# Patient Record
Sex: FEMALE | Race: WHITE | Employment: FULL TIME | URBAN - METROPOLITAN AREA
[De-identification: names, ages, dates, MRNs, and addresses within clinical notes are randomized per-mention and may not be internally consistent; named-entity substitution may affect disease eponyms.]

---

## 2017-07-12 ENCOUNTER — OFFICE VISIT (OUTPATIENT)
Dept: FAMILY MEDICINE | Facility: CLINIC | Age: 27
End: 2017-07-12
Payer: COMMERCIAL

## 2017-07-12 VITALS
DIASTOLIC BLOOD PRESSURE: 79 MMHG | OXYGEN SATURATION: 99 % | WEIGHT: 225.4 LBS | TEMPERATURE: 99.8 F | BODY MASS INDEX: 36.22 KG/M2 | SYSTOLIC BLOOD PRESSURE: 138 MMHG | HEART RATE: 89 BPM | HEIGHT: 66 IN

## 2017-07-12 DIAGNOSIS — R07.0 THROAT PAIN: Primary | ICD-10-CM

## 2017-07-12 DIAGNOSIS — H69.93 DYSFUNCTION OF EUSTACHIAN TUBE, BILATERAL: ICD-10-CM

## 2017-07-12 DIAGNOSIS — J01.00 ACUTE NON-RECURRENT MAXILLARY SINUSITIS: ICD-10-CM

## 2017-07-12 LAB
DEPRECATED S PYO AG THROAT QL EIA: NORMAL
MICRO REPORT STATUS: NORMAL
SPECIMEN SOURCE: NORMAL

## 2017-07-12 PROCEDURE — 87070 CULTURE OTHR SPECIMN AEROBIC: CPT | Performed by: PHYSICIAN ASSISTANT

## 2017-07-12 PROCEDURE — 87880 STREP A ASSAY W/OPTIC: CPT | Performed by: PHYSICIAN ASSISTANT

## 2017-07-12 PROCEDURE — 99213 OFFICE O/P EST LOW 20 MIN: CPT | Performed by: PHYSICIAN ASSISTANT

## 2017-07-12 NOTE — PROGRESS NOTES
"SUBJECTIVE:                                                    Jennifer Smiley is a 27 year old female who presents to clinic today for the following health issues:    ENT Symptoms             Symptoms: cc Present Absent Comment   Fever/Chills  x  Chills, no fever   Fatigue  x     Muscle Aches   x Did have soreness in upper back and neck, now is better   Eye Irritation   x    Sneezing  x     Nasal Topher/Drg x x  Postnasal, congestion   Sinus Pressure/Pain  x  Frontal and maxillary, bilateral   Loss of smell   x    Dental pain   x    Sore Throat  x     Swollen Glands   x    Ear Pain/Fullness  x  Ears plugged   Cough x x  productive   Wheeze   x    Chest Pain   x    Shortness of breath   x    Rash   x    Other  x  Headaches     Symptom duration:  1 week    Symptom severity:  moderate   Treatments tried:  Dayquil, nyquil. Nothing PO today    Contacts:  None     Left TM burst 1 year ago    Problem list and histories reviewed & adjusted, as indicated.  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     OBJECTIVE:                                                    /79 (BP Location: Right arm, Patient Position: Sitting, Cuff Size: Adult Regular)  Pulse 89  Temp 99.8  F (37.7  C) (Tympanic)  Ht 5' 5.75\" (1.67 m)  Wt 225 lb 6.4 oz (102.2 kg)  SpO2 99%  BMI 36.66 kg/m2 Body mass index is 36.66 kg/(m^2).   GENERAL: healthy, alert, well nourished, well hydrated, no distress  HENT: ear canals- normal; TMs- POSITIVE fluid bilaterally. Left TM mildly erythematous, bulging, no purulence " but has some bubbles from TM with the bulging; Nose- normal; Mouth- no ulcers, no lesions  NECK: no tenderness, no adenopathy, no asymmetry, no masses, no stiffness; thyroid- normal to palpation  RESP: lungs clear to auscultation - no rales, no rhonchi, no wheezes  CV: regular rates and rhythm, normal S1 S2, no S3 or S4 and no murmur, no click or rub -  ABDOMEN: soft, no tenderness, no  hepatosplenomegaly, no masses, normal bowel sounds       ASSESSMENT/PLAN:                                                      ASSESSMENT/PLAN:      ICD-10-CM    1. Throat pain R07.0 Strep, Rapid Screen     Throat Culture Aerobic Bacterial     CANCELED: Beta strep group A culture   2. Acute non-recurrent maxillary sinusitis J01.00 amoxicillin-clavulanate (AUGMENTIN) 875-125 MG per tablet   3. Dysfunction of eustachian tube, bilateral H69.83        Patient Instructions   Start augmentin  Continue dayquil/nyquil    Have plenty of rest and fluids  Humidified air can be very helpful with cough  Use nasal spray Afrin OTC for 3-4 days in each nostril for congestion  Antihistamine - drying  Follow up if worsening symptoms or if not improving    Margarita Ross PA-C   [facility]

## 2017-07-12 NOTE — NURSING NOTE
"Chief Complaint   Patient presents with     Sinus Problem     x 1 week       Initial There were no vitals taken for this visit. Estimated body mass index is 36.89 kg/(m^2) as calculated from the following:    Height as of 10/13/16: 5' 5.75\" (1.67 m).    Weight as of 10/13/16: 226 lb 12.8 oz (102.9 kg).  Medication Reconciliation: complete  "

## 2017-07-12 NOTE — PATIENT INSTRUCTIONS
Start augmentin  Continue dayquil/nyquil    Have plenty of rest and fluids  Humidified air can be very helpful with cough  Use nasal spray Afrin OTC for 3-4 days in each nostril for congestion  Antihistamine - drying  Follow up if worsening symptoms or if not improving

## 2017-07-12 NOTE — MR AVS SNAPSHOT
After Visit Summary   7/12/2017    Jennifer Smiley    MRN: [id number]           Patient Information     Date Of Birth          1990        Visit Information        Provider Department      7/12/2017 9:40 AM Margarita Ross PA-C [facility]        Today's Diagnoses     Throat pain    -  1    Acute non-recurrent maxillary sinusitis        Dysfunction of eustachian tube, bilateral          Care Instructions    Start augmentin  Continue dayquil/nyquil    Have plenty of rest and fluids  Humidified air can be very helpful with cough  Use nasal spray Afrin OTC for 3-4 days in each nostril for congestion  Follow up if worsening symptoms or if not improving          Follow-ups after your visit        Who to contact     Normal or non-critical lab and imaging results will be communicated to you by Lendiohart, letter or phone within 4 business days after the clinic has received the results. If you do not hear from us within 7 days, please contact the clinic through Lendiohart or phone. If you have a critical or abnormal lab result, we will notify you by phone as soon as possible.  Submit refill requests through Isolation Network or call your pharmacy and they will forward the refill request to us. Please allow 3 business days for your refill to be completed.          If you need to speak with a  for additional information , please call: [phone]             Additional Information About Your Visit        Isolation Network Information     Isolation Network gives you secure access to your electronic health record. If you see a primary care provider, you can also send messages to your care team and make appointments. If you have questions, please call your primary care clinic.  If you do not have a primary care provider, please call [phone] and they will assist you.        Care EveryWhere ID     This is your Care EveryWhere ID. This could be used by other organizations to access your [facility]  "records  [id number]        Your Vitals Were     Pulse Temperature Height Pulse Oximetry BMI (Body Mass Index)       89 99.8  F (37.7  C) (Tympanic) 5' 5.75\" (1.67 m) 99% 36.66 kg/m2        Blood Pressure from Last 3 Encounters:   07/12/17 138/79   10/13/16 127/83   09/29/16 137/83    Weight from Last 3 Encounters:   07/12/17 225 lb 6.4 oz (102.2 kg)   10/13/16 226 lb 12.8 oz (102.9 kg)   09/29/16 225 lb (102.1 kg)              We Performed the Following     Strep, Rapid Screen     Throat Culture Aerobic Bacterial          Today's Medication Changes          These changes are accurate as of: 7/12/17 10:42 AM.  If you have any questions, ask your nurse or doctor.               Start taking these medicines.        Dose/Directions    amoxicillin-clavulanate 875-125 MG per tablet   Commonly known as:  AUGMENTIN   Used for:  Acute non-recurrent maxillary sinusitis   Started by:  Margarita Ross PA-C        Dose:  1 tablet   Take 1 tablet by mouth 2 times daily   Quantity:  20 tablet   Refills:  0            Where to get your medicines      These medications were sent to [facility] [address]  [address] [place] [facility] [address]     Phone:  [phone]     amoxicillin-clavulanate 875-125 MG per tablet                Primary Care Provider Office Phone # Fax #    Philipp Pringle [phone]       [facility] [id number] [place] [address]        Equal Access to Services     [facility] AH: Hadii todd ku hadasho Soomaali, waaxda luqadaha, qaybta kaalmada adeegyada, loyda boucher. So [facility] [phone].    ATENCIÓN: Si habla español, tiene a larsen disposición servicios gratuitos de asistencia lingüística. Llame al [phone].    We comply with applicable federal civil rights laws and [place] laws. We do not discriminate on the basis of race, color, national origin, age, disability sex, sexual orientation or gender " identity.            Thank you!     Thank you for choosing [facility]  for your care. Our goal is always to provide you with excellent care. Hearing back from our patients is one way we can continue to improve our services. Please take a few minutes to complete the written survey that you may receive in the mail after your visit with us. Thank you!             Your Updated Medication List - Protect others around you: Learn how to safely use, store and throw away your medicines at www.disposemymeds.org.          This list is accurate as of: 7/12/17 10:42 AM.  Always use your most recent med list.                   Brand Name Dispense Instructions for use Diagnosis    amoxicillin-clavulanate 875-125 MG per tablet    AUGMENTIN    20 tablet    Take 1 tablet by mouth 2 times daily    Acute non-recurrent maxillary sinusitis       IBUPROFEN PO      Take 400 mg by mouth        levonorgestrel 20 MCG/24HR IUD    MIRENA    1 each    1 each (20 mcg) by Intrauterine route once for 1 dose    Encounter for IUD insertion       TYLENOL PO      Take 1,000 mg by mouth

## 2017-07-15 LAB
BACTERIA SPEC CULT: NORMAL
MICRO REPORT STATUS: NORMAL
SPECIMEN SOURCE: NORMAL

## 2017-07-21 ENCOUNTER — OFFICE VISIT (OUTPATIENT)
Dept: OBGYN | Facility: CLINIC | Age: 27
End: 2017-07-21
Payer: COMMERCIAL

## 2017-07-21 VITALS
BODY MASS INDEX: 36.16 KG/M2 | WEIGHT: 225 LBS | DIASTOLIC BLOOD PRESSURE: 73 MMHG | SYSTOLIC BLOOD PRESSURE: 127 MMHG | HEART RATE: 82 BPM | HEIGHT: 66 IN

## 2017-07-21 DIAGNOSIS — Z30.432 ENCOUNTER FOR IUD REMOVAL: Primary | ICD-10-CM

## 2017-07-21 PROCEDURE — 58301 REMOVE INTRAUTERINE DEVICE: CPT | Performed by: OBSTETRICS & GYNECOLOGY

## 2017-07-21 NOTE — MR AVS SNAPSHOT
"              After Visit Summary   7/21/2017    Jennifer Smiley    MRN: [id number]           Patient Information     Date Of Birth          1990        Visit Information        Provider Department      7/21/2017 1:30 PM Mira Garcia MD [facility]        Today's Diagnoses     Encounter for IUD removal    -  1       Follow-ups after your visit        Who to contact     If you have questions or need follow up information about today's clinic visit or your schedule please contact [facility] directly at [phone].  Normal or non-critical lab and imaging results will be communicated to you by CrowdPChart, letter or phone within 4 business days after the clinic has received the results. If you do not hear from us within 7 days, please contact the clinic through Joyhoundt or phone. If you have a critical or abnormal lab result, we will notify you by phone as soon as possible.  Submit refill requests through Axiom Education or call your pharmacy and they will forward the refill request to us. Please allow 3 business days for your refill to be completed.          Additional Information About Your Visit        MyChart Information     [employer] gives you secure access to your electronic health record. If you see a primary care provider, you can also send messages to your care team and make appointments. If you have questions, please call your primary care clinic.  If you do not have a primary care provider, please call [phone] and they will assist you.        Care EveryWhere ID     This is your Care EveryWhere ID. This could be used by other organizations to access your [facility] medical records  [id number]        Your Vitals Were     Pulse Height Last Period BMI (Body Mass Index)          82 5' 5.75\" (1.67 m) 07/15/2017 36.59 kg/m2         Blood Pressure from Last 3 Encounters:   07/21/17 127/73   07/12/17 138/79   10/13/16 127/83    Weight from Last 3 Encounters:   07/21/17 225 lb " (102.1 kg)   07/12/17 225 lb 6.4 oz (102.2 kg)   10/13/16 226 lb 12.8 oz (102.9 kg)              We Performed the Following     REMOVE INTRAUTERINE DEVICE        Primary Care Provider Office Phone # Fax #    Philipp Pringle [phone]       [facility] [address] [facility] [address]        Equal Access to Services     GRIS[facility]RE : Hadii aad ku hadasho Soomaali, waaxda luqadaha, qaybta kaalmada adeegyada, waxay idiin hayaan adeeg kharash lasander . So [facility] [phone].    ATENCIÓN: Si habla umang, tiene a larsen disposición servicios gratuitos de asistencia lingüística. Llame al [phone].    We comply with applicable federal civil rights laws and [place] laws. We do not discriminate on the basis of race, color, national origin, age, disability sex, sexual orientation or gender identity.            Thank you!     Thank you for choosing [facility]  for your care. Our goal is always to provide you with excellent care. Hearing back from our patients is one way we can continue to improve our services. Please take a few minutes to complete the written survey that you may receive in the mail after your visit with us. Thank you!             Your Updated Medication List - Protect others around you: Learn how to safely use, store and throw away your medicines at www.disposemymeds.org.          This list is accurate as of: 7/21/17  1:50 PM.  Always use your most recent med list.                   Brand Name Dispense Instructions for use Diagnosis    amoxicillin-clavulanate 875-125 MG per tablet    AUGMENTIN    20 tablet    Take 1 tablet by mouth 2 times daily    Acute non-recurrent maxillary sinusitis       IBUPROFEN PO      Take 400 mg by mouth        levonorgestrel 20 MCG/24HR IUD    MIRENA    1 each    1 each (20 mcg) by Intrauterine route once for 1 dose    Encounter for IUD insertion       TYLENOL PO      Take 1,000 mg by mouth

## 2017-07-21 NOTE — NURSING NOTE
"Initial /73 (BP Location: Right arm, Patient Position: Chair, Cuff Size: Adult Large)  Pulse 82  Ht 5' 5.75\" (1.67 m)  Wt 225 lb (102.1 kg)  LMP 07/15/2017  BMI 36.59 kg/m2 Estimated body mass index is 36.59 kg/(m^2) as calculated from the following:    Height as of this encounter: 5' 5.75\" (1.67 m).    Weight as of this encounter: 225 lb (102.1 kg). .      "

## 2017-07-21 NOTE — PROGRESS NOTES
Jennifer is a 27 year old   female who presents for removal of a Mirena IUD, which was inserted in ; she is ready to pursue pregnancy; she is not currently taking a prenatal vitamin.    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    Past Medical History:   Diagnosis Date     Otitis media     Recurrent otitis       Past Surgical History:   Procedure Laterality Date     PE TUBES      x 2         Current Outpatient Prescriptions:      amoxicillin-clavulanate (AUGMENTIN) 875-125 MG per tablet, Take 1 tablet by mouth 2 times daily, Disp: 20 tablet, Rfl: 0     IBUPROFEN PO, Take 400 mg by mouth, Disp: , Rfl:      Acetaminophen (TYLENOL PO), Take 1,000 mg by mouth, Disp: , Rfl:      levonorgestrel (MIRENA) 20 MCG/24HR IUD, 1 each (20 mcg) by Intrauterine route once for 1 dose, Disp: 1 each, Rfl: 0    ALLERGIES:  Pineapple and Ceclor [cefaclor]    Social History     Social History     Marital status:      Spouse name: N/A     Number of children: N/A     Years of education: N/A     Social History Main Topics     Smoking status: Never Smoker     Smokeless tobacco: Never Used     Alcohol use 0.0 oz/week     0 Standard drinks or equivalent per week      Comment: occas.     Drug use: No     Sexual activity: Yes     Partners: Male      Comment: engaged (together since )     Other Topics Concern     Parent/Sibling W/ Cabg, Mi Or Angioplasty Before 65f 55m? No     Social History Narrative       Family History   Problem Relation Age of Onset     Hypertension Mother  "     DIABETES Mother      DIABETES Maternal Grandmother      Hypertension Maternal Grandmother      Hypertension Maternal Grandfather      Autoimmune Disease Maternal Grandfather      lupus     Hypertension Paternal Grandmother      HEART DISEASE Paternal Grandmother      Cancer - colorectal Paternal Grandfather      in his 70's       OBJECTIVE:  Vitals: /73 (BP Location: Right arm, Patient Position: Chair, Cuff Size: Adult Large)  Pulse 82  Ht 5' 5.75\" (1.67 m)  Wt 225 lb (102.1 kg)  LMP 07/15/2017  BMI 36.59 kg/m2 BMI= Body mass index is 36.59 kg/(m^2).   Patient's last menstrual period was 07/15/2017.   After assuring informed consent, the pt was placed in lithotomy position, string located extending out the external os.  The string was grasped with a ring forceps and the IUD removed in a quick movement; pt tolerated the procedure well; the IUD was discarded    ASSESSMENT:      ICD-10-CM    1. Encounter for IUD removal Z30.432 REMOVE INTRAUTERINE DEVICE       PLAN:  Reminded pt about keeping an eye on 1st day of LMP moving forward, need to start vitamin with Folic Acid    Mira Garcia MD    "

## 2017-10-19 ENCOUNTER — PRENATAL OFFICE VISIT (OUTPATIENT)
Dept: OBGYN | Facility: CLINIC | Age: 27
End: 2017-10-19
Payer: COMMERCIAL

## 2017-10-19 DIAGNOSIS — Z34.00 PRENATAL CARE, FIRST PREGNANCY: Primary | ICD-10-CM

## 2017-10-19 PROCEDURE — 99207 ZZC NO CHARGE NURSE ONLY: CPT | Performed by: OBSTETRICS & GYNECOLOGY

## 2017-10-19 NOTE — MR AVS SNAPSHOT
After Visit Summary   10/19/2017    Jennifer Smiley    MRN: [id number]           Patient Information     Date Of Birth          1990        Visit Information        Provider Department      10/19/2017 3:17 PM Triny Soni MD [facility]        Today's Diagnoses     Prenatal care, first pregnancy    -  1       Follow-ups after your visit        Your next 10 appointments already scheduled     Nov 13, 2017  2:00 PM CST   New Prenatal with Triny Soni MD, [facility]   [facility]    [address] [facility] [address]   [phone]              Who to contact     If you have questions or need follow up information about today's clinic visit or your schedule please contact [facility] directly at [phone].  Normal or non-critical lab and imaging results will be communicated to you by MyChart, letter or phone within 4 business days after the clinic has received the results. If you do not hear from us within 7 days, please contact the clinic through MyChart or phone. If you have a critical or abnormal lab result, we will notify you by phone as soon as possible.  Submit refill requests through Incont or call your pharmacy and they will forward the refill request to us. Please allow 3 business days for your refill to be completed.          Additional Information About Your Visit        MyChart Information     Incont gives you secure access to your electronic health record. If you see a primary care provider, you can also send messages to your care team and make appointments. If you have questions, please call your primary care clinic.  If you do not have a primary care provider, please call [phone] and they will assist you.        Care EveryWhere ID     This is your Care EveryWhere ID. This could be used by other organizations to access your [facility] medical records  [id number]        Your Vitals  Were     Last Period                   09/14/2017            Blood Pressure from Last 3 Encounters:   07/21/17 127/73   07/12/17 138/79   10/13/16 127/83    Weight from Last 3 Encounters:   07/21/17 102.1 kg (225 lb)   07/12/17 102.2 kg (225 lb 6.4 oz)   10/13/16 102.9 kg (226 lb 12.8 oz)              Today, you had the following     No orders found for display       Primary Care Provider Office Phone # Fax #    Philipp Pringle [phone] [address] [facility] [address]        Equal Access to Services     [facility] TEJAL : Hadii todd ho hadaddyo Soleti, waaxda luqadaha, qaybta kaalmada angelica, loyda boucher. So [facility] [phone].    ATENCIÓN: Si habla español, tiene a larsen disposición servicios gratuitos de asistencia lingüística. Ll[facility] [phone].    We comply with applicable federal civil rights laws and [place] laws. We do not discriminate on the basis of race, color, national origin, age, disability, sex, sexual orientation, or gender identity.            Thank you!     Thank you for choosing [facility]  for your care. Our goal is always to provide you with excellent care. Hearing back from our patients is one way we can continue to improve our services. Please take a few minutes to complete the written survey that you may receive in the mail after your visit with us. Thank you!             Your Updated Medication List - Protect others around you: Learn how to safely use, store and throw away your medicines at www.disposemymeds.org.          This list is accurate as of: 10/19/17  3:30 PM.  Always use your most recent med list.                   Brand Name Dispense Instructions for use Diagnosis    FOLIC ACID PO      Take 800 mcg by mouth daily        TYLENOL PO      Take 1,000 mg by mouth

## 2017-11-13 ENCOUNTER — PRENATAL OFFICE VISIT (OUTPATIENT)
Dept: OBGYN | Facility: CLINIC | Age: 27
End: 2017-11-13
Payer: COMMERCIAL

## 2017-11-13 ENCOUNTER — HOSPITAL ENCOUNTER (OUTPATIENT)
Dept: ULTRASOUND IMAGING | Facility: CLINIC | Age: 27
Discharge: HOME | End: 2017-11-13
Attending: OBSTETRICS & GYNECOLOGY | Admitting: OBSTETRICS & GYNECOLOGY
Payer: COMMERCIAL

## 2017-11-13 VITALS
HEART RATE: 96 BPM | DIASTOLIC BLOOD PRESSURE: 91 MMHG | BODY MASS INDEX: 36.59 KG/M2 | SYSTOLIC BLOOD PRESSURE: 131 MMHG | WEIGHT: 225 LBS

## 2017-11-13 DIAGNOSIS — Z34.01 ENCOUNTER FOR PRENATAL CARE IN FIRST TRIMESTER OF FIRST PREGNANCY: Primary | ICD-10-CM

## 2017-11-13 DIAGNOSIS — Z34.01 ENCOUNTER FOR PRENATAL CARE IN FIRST TRIMESTER OF FIRST PREGNANCY: ICD-10-CM

## 2017-11-13 LAB
ABO + RH BLD: NORMAL
ABO + RH BLD: NORMAL
BLD GP AB SCN SERPL QL: NORMAL
BLOOD BANK CMNT PATIENT-IMP: NORMAL
SPECIMEN EXP DATE BLD: NORMAL

## 2017-11-13 PROCEDURE — 86850 RBC ANTIBODY SCREEN: CPT | Performed by: OBSTETRICS & GYNECOLOGY

## 2017-11-13 PROCEDURE — 86900 BLOOD TYPING SEROLOGIC ABO: CPT | Performed by: OBSTETRICS & GYNECOLOGY

## 2017-11-13 PROCEDURE — 86901 BLOOD TYPING SEROLOGIC RH(D): CPT | Performed by: OBSTETRICS & GYNECOLOGY

## 2017-11-13 PROCEDURE — 76817 TRANSVAGINAL US OBSTETRIC: CPT

## 2017-11-13 PROCEDURE — 76817 TRANSVAGINAL US OBSTETRIC: CPT | Performed by: OBSTETRICS & GYNECOLOGY

## 2017-11-13 PROCEDURE — 36415 COLL VENOUS BLD VENIPUNCTURE: CPT | Performed by: OBSTETRICS & GYNECOLOGY

## 2017-11-13 PROCEDURE — 99214 OFFICE O/P EST MOD 30 MIN: CPT | Mod: 25 | Performed by: OBSTETRICS & GYNECOLOGY

## 2017-11-13 RX ORDER — PRENATAL VIT/IRON FUM/FOLIC AC 27MG-0.8MG
1 TABLET ORAL DAILY
COMMUNITY

## 2017-11-13 NOTE — MR AVS SNAPSHOT
After Visit Summary   11/13/2017    Jennifer Orlando    MRN: [id number]           Patient Information     Date Of Birth          1990        Visit Information        Provider Department      11/13/2017 2:00 PM Triny Soni MD; [facility]        Today's Diagnoses     Encounter for prenatal care in first trimester of first pregnancy    -  1       Follow-ups after your visit        Future tests that were ordered for you today     Open Future Orders        Priority Expected Expires Ordered    US OB Transvaginal Only STAT  11/13/2018 11/13/2017            Who to contact     If you have questions or need follow up information about today's clinic visit or your schedule please contact [facility] directly at [phone].  Normal or non-critical lab and imaging results will be communicated to you by Wallithart, letter or phone within 4 business days after the clinic has received the results. If you do not hear from us within 7 days, please contact the clinic through Wallithart or phone. If you have a critical or abnormal lab result, we will notify you by phone as soon as possible.  Submit refill requests through Bizware or call your pharmacy and they will forward the refill request to us. Please allow 3 business days for your refill to be completed.          Additional Information About Your Visit        MyChart Information     Bizware gives you secure access to your electronic health record. If you see a primary care provider, you can also send messages to your care team and make appointments. If you have questions, please call your primary care clinic.  If you do not have a primary care provider, please call [phone] and they will assist you.        Care EveryWhere ID     This is your Care EveryWhere ID. This could be used by other organizations to access your [place] medical records  [id number]        Your Vitals Were     Pulse Last Period Breastfeeding?  BMI (Body Mass Index)          96 09/14/2017 No 36.59 kg/m2         Blood Pressure from Last 3 Encounters:   11/13/17 (!) 131/91   07/21/17 127/73   07/12/17 138/79    Weight from Last 3 Encounters:   11/13/17 225 lb (102.1 kg)   07/21/17 225 lb (102.1 kg)   07/12/17 225 lb 6.4 oz (102.2 kg)              We Performed the Following     ABO/Rh type and screen     US OB <14 Weeks w Transvaginal Single        Primary Care Provider Office Phone # Fax #    Philippsid Pringle [phone] [address] [facility] [address]        Equal Access to Services     GRISEL TOURE : Hadii todd mcguireo Soleti, waaxda luqadaha, qaybta kaalmada adeegyada, loyda boucher. So [facility] [phone].    ATENCIÓN: Si habla español, tiene a larsen disposición servicios gratuitos de asistencia lingüística. [facility] [phone].    We comply with applicable federal civil rights laws and [place] laws. We do not discriminate on the basis of race, color, national origin, age, disability, sex, sexual orientation, or gender identity.            Thank you!     Thank you for choosing [facility]  for your care. Our goal is always to provide you with excellent care. Hearing back from our patients is one way we can continue to improve our services. Please take a few minutes to complete the written survey that you may receive in the mail after your visit with us. Thank you!             Your Updated Medication List - Protect others around you: Learn how to safely use, store and throw away your medicines at www.disposemymeds.org.          This list is accurate as of: 11/13/17  4:34 PM.  Always use your most recent med list.                   Brand Name Dispense Instructions for use Diagnosis    FOLIC ACID PO      Take 800 mcg by mouth daily        prenatal multivitamin plus iron 27-0.8 MG Tabs per tablet      Take 1 tablet by mouth daily

## 2017-11-13 NOTE — NURSING NOTE
"Initial BP (!) 131/91 (BP Location: Left arm, Patient Position: Chair, Cuff Size: Adult Large)  Pulse 96  Wt 225 lb (102.1 kg)  LMP 09/14/2017  Breastfeeding? No  BMI 36.59 kg/m2 Estimated body mass index is 36.59 kg/(m^2) as calculated from the following:    Height as of 7/21/17: 5' 5.75\" (1.67 m).    Weight as of this encounter: 225 lb (102.1 kg). .    Mirian Ortega    "

## 2017-11-13 NOTE — PROGRESS NOTES
Jennifer is a 27 year old  @ 8.4 weeks here for new ob visit.      ROS: Ten point review of systems was reviewed and negative except the above.  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 Can take amox/augmentin       Current Outpatient Prescriptions on File Prior to Visit:  FOLIC ACID PO Take 800 mcg by mouth daily     No current facility-administered medications on file prior to visit.     Past Medical History of Father of Baby:   No significant medical history    Physical Exam: BP (!) 131/91 (BP Location: Left arm, Patient Position: Chair, Cuff Size: Adult Large)  Pulse 96  Wt 225 lb (102.1 kg)  LMP 2017  Breastfeeding? No  BMI 36.59 kg/m2  General: Well developed, well nourished female  Skin: Normal  HEENT: Normal  Neck: Supple,no adenopathy,thyroid normal  Chest: Clear  Heart: Regular rate, rhythm,No murmur, rub, gallop  Breasts: Not examined   Abdomen: Benign,Soft, flat, non-tender,No masses, organomegaly,No inguinal nodes,Bowel sounds normoactive   Extremities: Normal  Neurological: Normal   Perineum: Normal   Vulva: Normal     Transvaginal ultrasound was performed.  A  nonviable intrauterine pregnancy was seen.  CRL consistent with 6 weeks, 0 days.  Fetal heart motion was not visualized.    Formal U/S confirms findings.     A/P 27 year old  at  8.4 weeks    1. Reviewed that miscarriage occurs ~ 1 in 5 pregnancy events and that there was no direct event or prevention  that the patient could have avoided or performed.  Discussed that there are many etiologies for miscarriage, the most common being a genetic anomaly.  Reviewed that risk of miscarriage for next pregnancy is not elevated by the current event.    Reviewed options of expectant management, D&C, and medical therapy (cytotec).  Reviewed risks and benefits of all options.  All questions answered.  Patient given written information about her options and will call us with further questions or decision.     Triny Soni M.D.     30 minutes was spent face to face with the patient today discussing her history, diagnosis, and follow-up plan as noted above.  Over 50% of the visit was spent in counseling and coordination of care.

## 2017-11-17 ENCOUNTER — MYC MEDICAL ADVICE (OUTPATIENT)
Dept: OBGYN | Facility: CLINIC | Age: 27
End: 2017-11-17

## 2017-11-17 NOTE — TELEPHONE ENCOUNTER
Per Dr Singleton:    Per Dr. John documentation, there was a fetus without cardiac activity.  Normally, when the CRL measures greater or equal to 6 weeks (which this was) cardiac activity can be appreciated.  This is suspicious for fetal demise.  Review miscarriage precautions and patient should follow-up with Dr. Soni next week if she has additional concerns or questions.

## 2017-11-17 NOTE — TELEPHONE ENCOUNTER
Dr Singleton, Can you advise re: My chart message below in Dr. Soni's absence?.     I don't see a mention of a sac on US report and looks like this was an early PG termination. I also don't see any current HCG levels were done.     Please advise. Mell Pacheco RN

## 2017-11-17 NOTE — LETTER
SURGERYPLANNING/SCHEDULING WORKSHEET                                   Jennifer Orlando                :  1990  MRN:  [id number]  Phone: [phone] (home)    Same Day Surgery [phone]   Surgeon: Triny Soni MD  Diagnosis:   miscarriage  Allergies:  Pineapple and Ceclor [cefaclor]   A preoperative evaluation and physical will be done by this office.   ====================================================  Surgical Procedure:  OB-GYN D&C  Length of Procedure:  30  Type of anesthesia:  Local with MAC  The proposed surgical procedure is considered LOW risk.  Date of Procedure:__92-03-25______    Time: __11:30am_____________       Informed Consent Obtained and Signed:  NO  ====================================================  Instructions to Same Day Surgery Staff  Pneumoboots  Special Equipment: suction  Preop Antibiotic:  Other:  Doxycycline 100 mg x 1  Preop Pain Meds:  None  PreOp Orders:  Routine Standing Orders.  ====================================================  Instructions to the patient:  Come to the hospital at: ____10:30am___________________________  HOME PREPARATION:   Shower with Hibiclens the night before and the morning of surgery, gently cleaning skin from neck to feet  Bathe and brush teeth the morning of surgery.  Take medications with a sip of water the morning of surgery:   May have  a light meal, toast and clear liquids, up to 8 hrs before surgery  May have clear liquids (liquids one can read through) up to 2 hrs before surgery  NOTHING after 2 hrs before surgery  Triny Soni MD    2017

## 2017-11-22 NOTE — TELEPHONE ENCOUNTER
Pt scheduled for surgery 11-29-17.  All instructions given.    -Isabelle CRAIN [facility]  Clinic Station

## 2017-11-27 ENCOUNTER — TELEPHONE (OUTPATIENT)
Dept: OBGYN | Facility: CLINIC | Age: 27
End: 2017-11-27

## 2017-11-27 DIAGNOSIS — O03.9 MISCARRIAGE: Primary | ICD-10-CM

## 2017-11-27 NOTE — TELEPHONE ENCOUNTER
Pt called to let the MD know that she is cancelling the D & C surgery that was scheduled for 11/29/17 due to her insurance.  Pt wants to know if she can get the pills for this instead?    Please call-     Brittany Mcgee  Clinic Station

## 2017-11-28 ENCOUNTER — ANESTHESIA EVENT (OUTPATIENT)
Dept: SURGERY | Facility: CLINIC | Age: 27
End: 2017-11-28
Payer: COMMERCIAL

## 2017-11-28 RX ORDER — OXYCODONE HYDROCHLORIDE 5 MG/1
5 TABLET ORAL EVERY 4 HOURS PRN
Qty: 10 TABLET | Refills: 0 | Status: SHIPPED | OUTPATIENT
Start: 2017-11-28 | End: 2017-11-28

## 2017-11-28 RX ORDER — ONDANSETRON 4 MG/1
4-8 TABLET, ORALLY DISINTEGRATING ORAL EVERY 6 HOURS PRN
Qty: 5 TABLET | Refills: 1 | Status: SHIPPED | OUTPATIENT
Start: 2017-11-28 | End: 2017-11-28

## 2017-11-28 RX ORDER — MISOPROSTOL 200 UG/1
400 TABLET ORAL EVERY 4 HOURS
Qty: 12 TABLET | Refills: 1 | Status: SHIPPED | OUTPATIENT
Start: 2017-11-28 | End: 2017-11-28

## 2017-11-28 NOTE — TELEPHONE ENCOUNTER
I called surgery scheduling and the 11:30 time is still available, after Jennifer calls back tell her to arrive at 10:30am and re-send the surgery letter.    Brittany Mcgee  Clinic Station [place]

## 2017-11-28 NOTE — TELEPHONE ENCOUNTER
Patient working with insurance and may be able to have coverage.   If so, patient is choosing to go this route and would like the surgery rescheduled.  Patient will call us back to let us know for sure this morning.    Will send to surgery scheduling as an FYI for planning.    Flower Davila   Ob/Gyn Clinic  RN

## 2017-11-28 NOTE — TELEPHONE ENCOUNTER
Pt called back and she would like the surgery put back on for tomorrow.  Resent surgery letter.    Brittany Mazaton  Clinic Station [place]

## 2017-11-29 ENCOUNTER — HOSPITAL ENCOUNTER (OUTPATIENT)
Facility: CLINIC | Age: 27
Discharge: HOME | End: 2017-11-29
Attending: OBSTETRICS & GYNECOLOGY | Admitting: OBSTETRICS & GYNECOLOGY
Payer: COMMERCIAL

## 2017-11-29 ENCOUNTER — SURGERY (OUTPATIENT)
Age: 27
End: 2017-11-29

## 2017-11-29 ENCOUNTER — ANESTHESIA (OUTPATIENT)
Dept: SURGERY | Facility: CLINIC | Age: 27
End: 2017-11-29
Payer: COMMERCIAL

## 2017-11-29 VITALS
HEIGHT: 66 IN | OXYGEN SATURATION: 98 % | RESPIRATION RATE: 20 BRPM | TEMPERATURE: 98.9 F | HEART RATE: 72 BPM | SYSTOLIC BLOOD PRESSURE: 130 MMHG | BODY MASS INDEX: 36.16 KG/M2 | WEIGHT: 225 LBS | DIASTOLIC BLOOD PRESSURE: 68 MMHG

## 2017-11-29 DIAGNOSIS — Z34.01 ENCOUNTER FOR PRENATAL CARE IN FIRST TRIMESTER OF FIRST PREGNANCY: ICD-10-CM

## 2017-11-29 DIAGNOSIS — O03.9 MISCARRIAGE: Primary | ICD-10-CM

## 2017-11-29 PROBLEM — Z34.00 PRENATAL CARE, FIRST PREGNANCY: Status: RESOLVED | Noted: 2017-10-19 | Resolved: 2017-11-29

## 2017-11-29 PROCEDURE — 27110028 ZZH OR GENERAL SUPPLY NON-STERILE: Performed by: OBSTETRICS & GYNECOLOGY

## 2017-11-29 PROCEDURE — 25000128 H RX IP 250 OP 636: Performed by: NURSE ANESTHETIST, CERTIFIED REGISTERED

## 2017-11-29 PROCEDURE — 59820 CARE OF MISCARRIAGE: CPT | Performed by: OBSTETRICS & GYNECOLOGY

## 2017-11-29 PROCEDURE — 00000159 ZZHCL STATISTIC H-SEND OUTS PREP: Performed by: OBSTETRICS & GYNECOLOGY

## 2017-11-29 PROCEDURE — 71000027 ZZH RECOVERY PHASE 2 EACH 15 MINS: Performed by: OBSTETRICS & GYNECOLOGY

## 2017-11-29 PROCEDURE — 25000125 ZZHC RX 250: Performed by: OBSTETRICS & GYNECOLOGY

## 2017-11-29 PROCEDURE — 27210794 ZZH OR GENERAL SUPPLY STERILE: Performed by: OBSTETRICS & GYNECOLOGY

## 2017-11-29 PROCEDURE — 25000125 ZZHC RX 250: Performed by: NURSE ANESTHETIST, CERTIFIED REGISTERED

## 2017-11-29 PROCEDURE — 88305 TISSUE EXAM BY PATHOLOGIST: CPT | Performed by: OBSTETRICS & GYNECOLOGY

## 2017-11-29 PROCEDURE — 88305 TISSUE EXAM BY PATHOLOGIST: CPT | Mod: 26 | Performed by: OBSTETRICS & GYNECOLOGY

## 2017-11-29 PROCEDURE — 40000306 ZZH STATISTIC PRE PROC ASSESS II: Performed by: OBSTETRICS & GYNECOLOGY

## 2017-11-29 PROCEDURE — 25000128 H RX IP 250 OP 636: Performed by: OBSTETRICS & GYNECOLOGY

## 2017-11-29 PROCEDURE — 25000132 ZZH RX MED GY IP 250 OP 250 PS 637: Performed by: OBSTETRICS & GYNECOLOGY

## 2017-11-29 PROCEDURE — 37000008 ZZH ANESTHESIA TECHNICAL FEE, 1ST 30 MIN: Performed by: OBSTETRICS & GYNECOLOGY

## 2017-11-29 PROCEDURE — 36000050 ZZH SURGERY LEVEL 2 1ST 30 MIN: Performed by: OBSTETRICS & GYNECOLOGY

## 2017-11-29 PROCEDURE — 37000009 ZZH ANESTHESIA TECHNICAL FEE, EACH ADDTL 15 MIN: Performed by: OBSTETRICS & GYNECOLOGY

## 2017-11-29 RX ORDER — ONDANSETRON 4 MG/1
4 TABLET, ORALLY DISINTEGRATING ORAL
Status: CANCELLED | OUTPATIENT
Start: 2017-11-29

## 2017-11-29 RX ORDER — DOXYCYCLINE 100 MG/10ML
100 INJECTION, POWDER, LYOPHILIZED, FOR SOLUTION INTRAVENOUS
Status: COMPLETED | OUTPATIENT
Start: 2017-11-29 | End: 2017-11-29

## 2017-11-29 RX ORDER — DEXAMETHASONE SODIUM PHOSPHATE 4 MG/ML
INJECTION, SOLUTION INTRA-ARTICULAR; INTRALESIONAL; INTRAMUSCULAR; INTRAVENOUS; SOFT TISSUE PRN
Status: DISCONTINUED | OUTPATIENT
Start: 2017-11-29 | End: 2017-11-29

## 2017-11-29 RX ORDER — MEPERIDINE HYDROCHLORIDE 25 MG/ML
12.5 INJECTION INTRAMUSCULAR; INTRAVENOUS; SUBCUTANEOUS
Status: DISCONTINUED | OUTPATIENT
Start: 2017-11-29 | End: 2017-11-29 | Stop reason: HOSPADM

## 2017-11-29 RX ORDER — LIDOCAINE 40 MG/G
CREAM TOPICAL
Status: DISCONTINUED | OUTPATIENT
Start: 2017-11-29 | End: 2017-11-29 | Stop reason: HOSPADM

## 2017-11-29 RX ORDER — ACETAMINOPHEN 325 MG/1
975 TABLET ORAL ONCE
Status: COMPLETED | OUTPATIENT
Start: 2017-11-29 | End: 2017-11-29

## 2017-11-29 RX ORDER — ONDANSETRON 2 MG/ML
4 INJECTION INTRAMUSCULAR; INTRAVENOUS EVERY 30 MIN PRN
Status: DISCONTINUED | OUTPATIENT
Start: 2017-11-29 | End: 2017-11-29 | Stop reason: HOSPADM

## 2017-11-29 RX ORDER — HYDROMORPHONE HYDROCHLORIDE 1 MG/ML
.3-.5 INJECTION, SOLUTION INTRAMUSCULAR; INTRAVENOUS; SUBCUTANEOUS EVERY 10 MIN PRN
Status: DISCONTINUED | OUTPATIENT
Start: 2017-11-29 | End: 2017-11-29 | Stop reason: HOSPADM

## 2017-11-29 RX ORDER — OXYCODONE HYDROCHLORIDE 5 MG/1
5-10 TABLET ORAL
Qty: 10 TABLET | Refills: 0 | Status: SHIPPED | OUTPATIENT
Start: 2017-11-29 | End: 2017-12-30

## 2017-11-29 RX ORDER — OXYCODONE HYDROCHLORIDE 5 MG/1
5 TABLET ORAL
Status: CANCELLED | OUTPATIENT
Start: 2017-11-29

## 2017-11-29 RX ORDER — FENTANYL CITRATE 50 UG/ML
25-50 INJECTION, SOLUTION INTRAMUSCULAR; INTRAVENOUS
Status: DISCONTINUED | OUTPATIENT
Start: 2017-11-29 | End: 2017-11-29 | Stop reason: HOSPADM

## 2017-11-29 RX ORDER — IBUPROFEN 600 MG/1
600 TABLET, FILM COATED ORAL
Status: CANCELLED | OUTPATIENT
Start: 2017-11-29

## 2017-11-29 RX ORDER — FENTANYL CITRATE 50 UG/ML
INJECTION, SOLUTION INTRAMUSCULAR; INTRAVENOUS PRN
Status: DISCONTINUED | OUTPATIENT
Start: 2017-11-29 | End: 2017-11-29

## 2017-11-29 RX ORDER — SODIUM CHLORIDE, SODIUM LACTATE, POTASSIUM CHLORIDE, CALCIUM CHLORIDE 600; 310; 30; 20 MG/100ML; MG/100ML; MG/100ML; MG/100ML
INJECTION, SOLUTION INTRAVENOUS CONTINUOUS
Status: DISCONTINUED | OUTPATIENT
Start: 2017-11-29 | End: 2017-11-29 | Stop reason: HOSPADM

## 2017-11-29 RX ORDER — KETOROLAC TROMETHAMINE 30 MG/ML
30 INJECTION, SOLUTION INTRAMUSCULAR; INTRAVENOUS ONCE
Status: COMPLETED | OUTPATIENT
Start: 2017-11-29 | End: 2017-11-29

## 2017-11-29 RX ORDER — ONDANSETRON 4 MG/1
4 TABLET, ORALLY DISINTEGRATING ORAL EVERY 30 MIN PRN
Status: DISCONTINUED | OUTPATIENT
Start: 2017-11-29 | End: 2017-11-29 | Stop reason: HOSPADM

## 2017-11-29 RX ORDER — IBUPROFEN 600 MG/1
600 TABLET, FILM COATED ORAL EVERY 6 HOURS PRN
Qty: 30 TABLET | Refills: 0 | Status: SHIPPED | OUTPATIENT
Start: 2017-11-29 | End: 2017-12-30

## 2017-11-29 RX ORDER — PROPOFOL 10 MG/ML
INJECTION, EMULSION INTRAVENOUS CONTINUOUS PRN
Status: DISCONTINUED | OUTPATIENT
Start: 2017-11-29 | End: 2017-11-29

## 2017-11-29 RX ORDER — NALOXONE HYDROCHLORIDE 0.4 MG/ML
.1-.4 INJECTION, SOLUTION INTRAMUSCULAR; INTRAVENOUS; SUBCUTANEOUS
Status: DISCONTINUED | OUTPATIENT
Start: 2017-11-29 | End: 2017-11-29 | Stop reason: HOSPADM

## 2017-11-29 RX ORDER — HYDROXYZINE HYDROCHLORIDE 25 MG/1
25 TABLET, FILM COATED ORAL
Status: CANCELLED | OUTPATIENT
Start: 2017-11-29

## 2017-11-29 RX ORDER — LIDOCAINE HYDROCHLORIDE 10 MG/ML
INJECTION, SOLUTION INFILTRATION; PERINEURAL PRN
Status: DISCONTINUED | OUTPATIENT
Start: 2017-11-29 | End: 2017-11-29 | Stop reason: HOSPADM

## 2017-11-29 RX ORDER — ACETAMINOPHEN 325 MG/1
650 TABLET ORAL EVERY 4 HOURS PRN
Qty: 100 TABLET | Refills: 0 | COMMUNITY
Start: 2017-11-29 | End: 2017-12-30

## 2017-11-29 RX ORDER — DOXYCYCLINE 100 MG/1
100 CAPSULE ORAL 2 TIMES DAILY
Qty: 6 CAPSULE | Refills: 0 | Status: SHIPPED | OUTPATIENT
Start: 2017-11-29 | End: 2017-12-02

## 2017-11-29 RX ORDER — ONDANSETRON 2 MG/ML
INJECTION INTRAMUSCULAR; INTRAVENOUS PRN
Status: DISCONTINUED | OUTPATIENT
Start: 2017-11-29 | End: 2017-11-29

## 2017-11-29 RX ADMIN — LIDOCAINE HYDROCHLORIDE 1 ML: 10 INJECTION, SOLUTION EPIDURAL; INFILTRATION; INTRACAUDAL; PERINEURAL at 11:17

## 2017-11-29 RX ADMIN — DOXYCYCLINE 100 MG: 100 INJECTION, POWDER, LYOPHILIZED, FOR SOLUTION INTRAVENOUS at 11:42

## 2017-11-29 RX ADMIN — ONDANSETRON 4 MG: 2 INJECTION INTRAMUSCULAR; INTRAVENOUS at 11:45

## 2017-11-29 RX ADMIN — MIDAZOLAM HYDROCHLORIDE 2 MG: 1 INJECTION, SOLUTION INTRAMUSCULAR; INTRAVENOUS at 11:42

## 2017-11-29 RX ADMIN — FENTANYL CITRATE 100 MCG: 50 INJECTION, SOLUTION INTRAMUSCULAR; INTRAVENOUS at 11:42

## 2017-11-29 RX ADMIN — LIDOCAINE HYDROCHLORIDE 10 ML: 10 INJECTION, SOLUTION INFILTRATION; PERINEURAL at 12:04

## 2017-11-29 RX ADMIN — PROPOFOL 150 MCG/KG/MIN: 10 INJECTION, EMULSION INTRAVENOUS at 11:50

## 2017-11-29 RX ADMIN — KETOROLAC TROMETHAMINE 30 MG: 30 INJECTION, SOLUTION INTRAMUSCULAR at 11:16

## 2017-11-29 RX ADMIN — SODIUM CHLORIDE, POTASSIUM CHLORIDE, SODIUM LACTATE AND CALCIUM CHLORIDE: 600; 310; 30; 20 INJECTION, SOLUTION INTRAVENOUS at 11:17

## 2017-11-29 RX ADMIN — ACETAMINOPHEN 975 MG: 325 TABLET, FILM COATED ORAL at 11:16

## 2017-11-29 RX ADMIN — DEXAMETHASONE SODIUM PHOSPHATE 4 MG: 4 INJECTION, SOLUTION INTRA-ARTICULAR; INTRALESIONAL; INTRAMUSCULAR; INTRAVENOUS; SOFT TISSUE at 11:46

## 2017-11-29 NOTE — INTERVAL H&P NOTE
Patient decided to proceed with D&C.  Patient understood risks and benefits including risks of bleeding, infection and damage to surrounding structures.  The patient consented to proceed.       Triny Soni M.D.

## 2017-11-29 NOTE — DISCHARGE INSTRUCTIONS
Same Day Surgery Discharge Instructions  Special Precautions After Surgery - Adult    1. It is not unusual to feel lightheaded or faint, up to 24 hours after surgery or while taking pain medication.  If you have these symptoms; sit for a few minutes before standing and have someone assist you when getting up.  2. You should rest and relax for the next 24 hours and must have someone stay with you for at least 24 hours after your discharge.  3. DO NOT DRIVE any vehicle or operate mechanical equipment for 24 hours following the end of your surgery.  DO NOT DRIVE while taking narcotic pain medications that have been prescribed by your physician.  If you had a limb operated on, you must be able to use it fully to drive.  4. DO NOT drink alcoholic beverages for 24 hours following surgery or while taking prescription pain medication.  5. Drink clear liquids (apple juice, ginger ale, broth, 7-Up, etc.).  Progress to your regular diet as you feel able.  6. Any questions call your physician and do not make important decisions for 24 hours.    ACTIVITY  ? As tolerated     INCISIONAL CARE  ? Call surgeon for excessive bleeding, saturating more than 1 pad per hour     Call for an appointment to return to the clinic in 2 weeks if needed (not mandatory)    Medications:  ? Take Tylenol and Ibuprofen first for mild/moderate, use Oxycodone for severe breakthrough pain       __________________________________________________________________________________________________________________________________  IMPORTANT NUMBERS:    [facility] Main Number:  [phone], [phone]  Pharmacy:  [phone]  Same Day Surgery:  [phone], Monday - Friday until 8:30 p.m.  Urgent Care:  [phone]  Emergency Room:  [phone]      [facility]:  [phone]                                                                               OB Clinic:  [phone]       Nausea and Vomiting  What are nausea and vomiting?    Nausea is the queasy feeling you usually have before you vomit. Vomiting is the forceful emptying (throwing up) of the stomach's contents through the mouth.   What causes nausea and vomiting?   Nausea and vomiting are symptoms that may occur with many conditions, such as:   Anesthesia medications   side effect of narcotic medicines  exposure to unpleasant odors or sights   stress and anxiety     How is it treated?   At first you should rest your stomach for a few hours by eating nothing solid and sipping only clear liquids. A little later you can eat soft bland foods that are easy to digest.   It is important to drink small amounts (1 to 4 ounces) often so that you do not become dehydrated. Gradually drink larger amounts of the clear fluids. If you vomit, wait an hour, then start over with a smaller amount of fluid.   Eat slowly and avoid foods that are acidic, spicy, fatty, or fibrous (such as meats, coarse grains, and raw vegetables). Also avoid extremely hot or cold food. In addition, avoid dairy products if you have diarrhea. You may start eating your normal diet again in 3 days or so, when all signs of illness have passed.   Rest as much as possible. Sit or lie down with your head propped up. Do not lie flat for at least 2 hours after eating. Nausea and vomiting usually last only a short period of time. If you have cramping or pain in your belly you can try putting a heating pad set at low or a covered hot water bottle on your belly. Never set a heating pad on high because you could get burned.   If you have been vomiting for more than a day or have had diarrhea for over 3 days, you may need to have an exam by your provider, including a check for dehydration. If you are very dehydrated, you may need to be given fluids intravenously (IV). In children and older adults dehydration can quickly become life threatening.   When should I call my healthcare provider?   Talk with your provider if you are unable to keep  fluids down for more than 12 hours or if you have any of the following symptoms with nausea and vomiting:   severe headache or neck ache, or stiff neck   severe abdominal pain   diarrhea and vomiting that last more than 24 hours   blood in the vomited material that may look red, brown, or black, or like coffee grounds   bloody diarrhea   very forceful vomiting   signs of dehydration such as dry mouth, excessive thirst, little or no urination, severe weakness, dizziness, or lightheadedness.   If you have nausea and pain in the jaw, arm, shoulder, chest, or back; sweating; shortness of breath; or lightheadedness; call 911 for emergency care.            Tips for taking pain medications  To get the best pain relief possible , remember these points:      Take pain medications as directed, before pain becomes severe      Pain medication can upset your stomach: taking it with food may help      Constipation is a common side effect of pain medication. Drink plenty of  Fluids      Eat foods high in fiber. Take a stool softener  if recommended by your doctor or  Pharmacist.        Do not drink alcohol, drive or operate machinery while taking pain medications.    Ask about other ways to control pain, such as with heat, ice or relaxation.

## 2017-11-29 NOTE — H&P (VIEW-ONLY)
Jennifer is a 27 year old  @ 8.4 weeks here for new ob visit.      ROS: Ten point review of systems was reviewed and negative except the above.  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 Can take amox/augmentin       Current Outpatient Prescriptions on File Prior to Visit:  FOLIC ACID PO Take 800 mcg by mouth daily     No current facility-administered medications on file prior to visit.     Past Medical History of Father of Baby:   No significant medical history    Physical Exam: BP (!) 131/91 (BP Location: Left arm, Patient Position: Chair, Cuff Size: Adult Large)  Pulse 96  Wt 225 lb (102.1 kg)  LMP 2017  Breastfeeding? No  BMI 36.59 kg/m2  General: Well developed, well nourished female  Skin: Normal  HEENT: Normal  Neck: Supple,no adenopathy,thyroid normal  Chest: Clear  Heart: Regular rate, rhythm,No murmur, rub, gallop  Breasts: Not examined   Abdomen: Benign,Soft, flat, non-tender,No masses, organomegaly,No inguinal nodes,Bowel sounds normoactive   Extremities: Normal  Neurological: Normal   Perineum: Normal   Vulva: Normal     Transvaginal ultrasound was performed.  A  nonviable intrauterine pregnancy was seen.  CRL consistent with 6 weeks, 0 days.  Fetal heart motion was not visualized.    Formal U/S confirms findings.     A/P 27 year old  at  8.4 weeks    1. Reviewed that miscarriage occurs ~ 1 in 5 pregnancy events and that there was no direct event or prevention  that the patient could have avoided or performed.  Discussed that there are many etiologies for miscarriage, the most common being a genetic anomaly.  Reviewed that risk of miscarriage for next pregnancy is not elevated by the current event.    Reviewed options of expectant management, D&C, and medical therapy (cytotec).  Reviewed risks and benefits of all options.  All questions answered.  Patient given written information about her options and will call us with further questions or decision.     Triny Soni M.D.     30 minutes was spent face to face with the patient today discussing her history, diagnosis, and follow-up plan as noted above.  Over 50% of the visit was spent in counseling and coordination of care.

## 2017-11-29 NOTE — ANESTHESIA CARE TRANSFER NOTE
Patient: Jennifer Orlando    Procedure(s):  Dilation and Curettage Suction - Wound Class: II-Clean Contaminated    Diagnosis: miscarriage  Diagnosis Additional Information: No value filed.    Anesthesia Type:   MAC     Note:  Airway :Room Air  Patient transferred to:Phase II  Handoff Report: Identifed the Patient, Identified the Reponsible Provider, Reviewed the pertinent medical history, Discussed the surgical course, Reviewed Intra-OP anesthesia mangement and issues during anesthesia, Set expectations for post-procedure period and Allowed opportunity for questions and acknowledgement of understanding      Vitals: (Last set prior to Anesthesia Care Transfer)    CRNA VITALS  11/29/2017 1142 - 11/29/2017 1215      11/29/2017             Pulse: 70    SpO2: 94 %                Electronically Signed By: Gary Fall CRNA, IRINA CLARK  November 29, 2017  12:15 PM

## 2017-11-29 NOTE — BRIEF OP NOTE
Brief Op Note    Preop Dx: missed ab  Postop DX: same  Procedure: dilation and curettage   Surgeon: Triny Soni M.D.   Assist: n/a  Anesthesia: local/MAC  FRA Score: 1  EBL: minimal  IVF: see anesthesia records  UOP: n/a  Complications: none  Findings: normal appearing cervix   Disposition: stable

## 2017-11-29 NOTE — IP AVS SNAPSHOT
[facility] PreOP/Phase II    [address] [facility] [address] [address]    Phone:  [phone]    Fax:  [phone]                                       After Visit Summary   11/29/2017    Jennifer Orlando    MRN: [id number]           After Visit Summary Signature Page     I have received my discharge instructions, and my questions have been answered. I have discussed any challenges I see with this plan with the nurse or doctor.    ..........................................................................................................................................  Patient/Patient Representative Signature      ..........................................................................................................................................  Patient Representative Print Name and Relationship to Patient    ..................................................               ................................................  Date                                            Time    ..........................................................................................................................................  Reviewed by Signature/Title    ...................................................              ..............................................  Date                                                            Time

## 2017-11-29 NOTE — OP NOTE
Op Note    Preop Dx: 1. Missed     Postop DX: 1. Missed     Procedure: Dilation and curettage    Surgeon: Triny Soni M.D.     Assist: n/a    Anesthesia: local MAC    FRA Score: 1    EBL: minimal    IVF: see anesthesia records    UOP: see anesthesia records    Complications: none    Findings:  Normal cervix    Indications: 27 year old yo  @ 10w6d who was found to have a missed  in the office.  She waited a week without any bleeding.  She desired surgical intervention.   Decision was made to proceed with dilation and curettage.  Risks and benefits were discussed including risks of bleeding, infection and damage to the uterus.  Patient desired to proceed.     Procedure:  Patient was brought to the operating room and monitored anesthesia care was provided without difficulty.  She was prepped and draped in the usual sterile fashion in the dorsal lithotomy position.  A bivalve speculum was placed in the vagina.  The anterior lip of the cervix was infiltrated with 2 cc of 2% lidocaine and grasped with a single-toothed tenaculum.  A paracervical block was performed using 4 cc of 2% lidocaine at the 4 and 7 o'clock positions.  The cervix was progressively dilated to  8 mm.  The uterine contents were emptied using a suction curet followed by a gentle sharp curet to ensure complete emptying of the uterus.   All instruments were removed from the vagina and excellent hemostasis was noted.      Patient tolerated the procedure well.  Sponge, lap and needle counts are correct.  I was present for the entire procedure.  The patient was taken to her recovery room in stable condition.  She received norman-operative doxycycline.

## 2017-11-29 NOTE — IP AVS SNAPSHOT
MRN:[id number]                      After Visit Summary   11/29/2017    Jennifer Orlando    MRN: [id number]           Thank you!     Thank you for choosing [facility] for your care. Our goal is always to provide you with excellent care. Hearing back from our patients is one way we can continue to improve our services. Please take a few minutes to complete the written survey that you may receive in the mail after you visit with us. Thank you!        Patient Information     Date Of Birth          1990        About your hospital stay     You were admitted on:  November 29, 2017 You last received care in the:  [facility] PreOP/Phase II    You were discharged on:  November 29, 2017       Who to Call     For medical emergencies, please call 911.  For non-urgent questions about your medical care, please call your primary care provider or clinic, [phone]  For questions related to your surgery, please call your surgery clinic        Attending Provider     Provider Triny Nunez MD OB/Gyn       Primary Care Provider Office Phone # Fax #    Philipp Pringle [phone]      After Care Instructions     Discharge Instructions       Pelvic Rest. No tampons, douching or intercourse for  1-2 weeks.  I recommend condoms until your first normal period.            Discharge Instructions       Patient to arrange follow up appointment in 2 Weeks as needed (not mandatory)                  Further instructions from your care team                           Same Day Surgery Discharge Instructions  Special Precautions After Surgery - Adult    1. It is not unusual to feel lightheaded or faint, up to 24 hours after surgery or while taking pain medication.  If you have these symptoms; sit for a few minutes before standing and have someone assist you when getting up.  2. You should rest and relax for the next 24 hours and must have someone stay with you for at least 24 hours after your  discharge.  3. DO NOT DRIVE any vehicle or operate mechanical equipment for 24 hours following the end of your surgery.  DO NOT DRIVE while taking narcotic pain medications that have been prescribed by your physician.  If you had a limb operated on, you must be able to use it fully to drive.  4. DO NOT drink alcoholic beverages for 24 hours following surgery or while taking prescription pain medication.  5. Drink clear liquids (apple juice, ginger ale, broth, 7-Up, etc.).  Progress to your regular diet as you feel able.  6. Any questions call your physician and do not make important decisions for 24 hours.    ACTIVITY  ? As tolerated     INCISIONAL CARE  ? Call surgeon for excessive bleeding, saturating more than 1 pad per hour     Call for an appointment to return to the clinic in 2 weeks if needed (not mandatory)    Medications:  ? Take Tylenol and Ibuprofen first for mild/moderate, use Oxycodone for severe breakthrough pain       __________________________________________________________________________________________________________________________________  IMPORTANT NUMBERS:    [facility] Main Number:  [phone], [phone]  Pharmacy:  [phone]  Same Day Surgery:  [phone], Monday - Friday until 8:30 p.m.  Urgent Care:  [phone]  Emergency Room:  [phone]      [facility]:  [phone]                                                                                Clinic:  [phone]       Nausea and Vomiting  What are nausea and vomiting?   Nausea is the queasy feeling you usually have before you vomit. Vomiting is the forceful emptying (throwing up) of the stomach's contents through the mouth.   What causes nausea and vomiting?   Nausea and vomiting are symptoms that may occur with many conditions, such as:   Anesthesia medications   side effect of narcotic medicines  exposure to unpleasant odors or sights   stress and anxiety     How is it treated?   At first you should rest your  stomach for a few hours by eating nothing solid and sipping only clear liquids. A little later you can eat soft bland foods that are easy to digest.   It is important to drink small amounts (1 to 4 ounces) often so that you do not become dehydrated. Gradually drink larger amounts of the clear fluids. If you vomit, wait an hour, then start over with a smaller amount of fluid.   Eat slowly and avoid foods that are acidic, spicy, fatty, or fibrous (such as meats, coarse grains, and raw vegetables). Also avoid extremely hot or cold food. In addition, avoid dairy products if you have diarrhea. You may start eating your normal diet again in 3 days or so, when all signs of illness have passed.   Rest as much as possible. Sit or lie down with your head propped up. Do not lie flat for at least 2 hours after eating. Nausea and vomiting usually last only a short period of time. If you have cramping or pain in your belly you can try putting a heating pad set at low or a covered hot water bottle on your belly. Never set a heating pad on high because you could get burned.   If you have been vomiting for more than a day or have had diarrhea for over 3 days, you may need to have an exam by your provider, including a check for dehydration. If you are very dehydrated, you may need to be given fluids intravenously (IV). In children and older adults dehydration can quickly become life threatening.   When should I call my healthcare provider?   Talk with your provider if you are unable to keep fluids down for more than 12 hours or if you have any of the following symptoms with nausea and vomiting:   severe headache or neck ache, or stiff neck   severe abdominal pain   diarrhea and vomiting that last more than 24 hours   blood in the vomited material that may look red, brown, or black, or like coffee grounds   bloody diarrhea   very forceful vomiting   signs of dehydration such as dry mouth, excessive thirst, little or no urination,  "severe weakness, dizziness, or lightheadedness.   If you have nausea and pain in the jaw, arm, shoulder, chest, or back; sweating; shortness of breath; or lightheadedness; call 911 for emergency care.            Tips for taking pain medications  To get the best pain relief possible , remember these points:      Take pain medications as directed, before pain becomes severe      Pain medication can upset your stomach: taking it with food may help      Constipation is a common side effect of pain medication. Drink plenty of  Fluids      Eat foods high in fiber. Take a stool softener  if recommended by your doctor or  Pharmacist.        Do not drink alcohol, drive or operate machinery while taking pain medications.    Ask about other ways to control pain, such as with heat, ice or relaxation.          Pending Results     No orders found from 11/27/2017 to 11/30/2017.            Admission Information     Date & Time Provider Department Dept. Phone    11/29/2017 Triny Soni MD [facility] PreOP/Phase [phone]      Your Vitals Were     Blood Pressure Pulse Temperature Respirations Height Weight    131/69 67 98  F (36.7  C) (Oral) 16 1.676 m (5' 6\") 102.1 kg (225 lb)    Last Period Pulse Oximetry BMI (Body Mass Index)             09/14/2017 99% 36.32 kg/m2         MyChart Information     [employer] gives you secure access to your electronic health record. If you see a primary care provider, you can also send messages to your care team and make appointments. If you have questions, please call your primary care clinic.  If you do not have a primary care provider, please call [phone] and they will assist you.        Care EveryWhere ID     This is your Care EveryWhere ID. This could be used by other organizations to access your [facility] medical records  [id number]        Equal Access to Services     GRISEL TOURE AH: rocio Gooden, loyda guy " josue travis'aan ah. So [facility] [phone].    ATENCIÓN: Si cresencio heck, tiene a larsen disposición servicios gratuitos de asistencia lingüística. Clovis devlin [phone].    We comply with applicable federal civil rights laws and [place] laws. We do not discriminate on the basis of race, color, national origin, age, disability, sex, sexual orientation, or gender identity.               Review of your medicines      START taking        Dose / Directions    acetaminophen 325 MG tablet   Commonly known as:  TYLENOL   Used for:  Miscarriage        Dose:  650 mg   Take 2 tablets (650 mg) by mouth every 4 hours as needed for other (mild pain)   Quantity:  100 tablet   Refills:  0       doxycycline 100 MG capsule   Commonly known as:  VIBRAMYCIN   Used for:  Miscarriage        Dose:  100 mg   Take 1 capsule (100 mg) by mouth 2 times daily for 3 days   Quantity:  6 capsule   Refills:  0       ibuprofen 600 MG tablet   Commonly known as:  ADVIL/MOTRIN   Used for:  Miscarriage        Dose:  600 mg   Take 1 tablet (600 mg) by mouth every 6 hours as needed for pain (mild)   Quantity:  30 tablet   Refills:  0       oxyCODONE IR 5 MG tablet   Commonly known as:  ROXICODONE   Used for:  Miscarriage        Dose:  5-10 mg   Take 1-2 tablets (5-10 mg) by mouth every 3 hours as needed for pain or other (Moderate to Severe)   Quantity:  10 tablet   Refills:  0         CONTINUE these medicines which have NOT CHANGED        Dose / Directions    FOLIC ACID PO        Dose:  800 mcg   Take 800 mcg by mouth daily   Refills:  0       prenatal multivitamin plus iron 27-0.8 MG Tabs per tablet        Dose:  1 tablet   Take 1 tablet by mouth daily   Refills:  0            Where to get your medicines      These medications were sent to [facility] [place], [place] - [address]  [address] [address]     Phone:  [phone]     doxycycline 100 MG capsule    ibuprofen 600 MG tablet         Some of these will need  a paper prescription and others can be bought over the counter. Ask your nurse if you have questions.     Bring a paper prescription for each of these medications     oxyCODONE IR 5 MG tablet       You don't need a prescription for these medications     acetaminophen 325 MG tablet               ANTIBIOTIC INSTRUCTION     You've Been Prescribed an Antibiotic - Now What?  Your healthcare team thinks that you or your loved one might have an infection. Some infections can be treated with antibiotics, which are powerful, life-saving drugs. Like all medications, antibiotics have side effects and should only be used when necessary. There are some important things you should know about your antibiotic treatment.      Your healthcare team may run tests before you start taking an antibiotic.    Your team may take samples (e.g., from your blood, urine or other areas) to run tests to look for bacteria. These test can be important to determine if you need an antibiotic at all and, if you do, which antibiotic will work best.      Within a few days, your healthcare team might change or even stop your antibiotic.    Your team may start you on an antibiotic while they are working to find out what is making you sick.    Your team might change your antibiotic because test results show that a different antibiotic would be better to treat your infection.    In some cases, once your team has more information, they learn that you do not need an antibiotic at all. They may find out that you don't have an infection, or that the antibiotic you're taking won't work against your infection. For example, an infection caused by a virus can't be treated with antibiotics. Staying on an antibiotic when you don't need it is more likely to be harmful than helpful.      You may experience side effects from your antibiotic.    Like all medications, antibiotics have side effects. Some of these can be serious.    Let you healthcare team know if you have  any known allergies when you are admitted to the hospital.    One significant side effect of nearly all antibiotics is the risk of severe and sometimes deadly diarrhea caused by Clostridium difficile (C. Difficile). This occurs when a person takes antibiotics because some good germs are destroyed. Antibiotic use allows C. diificile to take over, putting patients at high risk for this serious infection.    As a patient or caregiver, it is important to understand your or your loved one's antibiotic treatment. It is especially important for caregivers to speak up when patients can't speak for themselves. Here are some important questions to ask your healthcare team.    What infection is this antibiotic treating and how do you know I have that infection?    What side effects might occur from this antibiotic?    How long will I need to take this antibiotic?    Is it safe to take this antibiotic with other medications or supplements (e.g., vitamins) that I am taking?     Are there any special directions I need to know about taking this antibiotic? For example, should I take it with food?    How will I be monitored to know whether my infection is responding to the antibiotic?    What tests may help to make sure the right antibiotic is prescribed for me?      Information provided by:  www.cdc.gov/getsmart  U.S. Department of Health and Human Services  Centers for disease Control and Prevention  National Center for Emerging and Zoonotic Infectious Diseases  Division of Healthcare Quality Promotion         Protect others around you: Learn how to safely use, store and throw away your medicines at www.disposemymeds.org.             Medication List: This is a list of all your medications and when to take them. Check marks below indicate your daily home schedule. Keep this list as a reference.      Medications           Morning Afternoon Evening Bedtime As Needed    acetaminophen 325 MG tablet   Commonly known as:  TYLENOL   Take  2 tablets (650 mg) by mouth every 4 hours as needed for other (mild pain)   Last time this was given:  975 mg on 11/29/2017 11:16 AM                                doxycycline 100 MG capsule   Commonly known as:  VIBRAMYCIN   Take 1 capsule (100 mg) by mouth 2 times daily for 3 days                                FOLIC ACID PO   Take 800 mcg by mouth daily                                ibuprofen 600 MG tablet   Commonly known as:  ADVIL/MOTRIN   Take 1 tablet (600 mg) by mouth every 6 hours as needed for pain (mild)                                oxyCODONE IR 5 MG tablet   Commonly known as:  ROXICODONE   Take 1-2 tablets (5-10 mg) by mouth every 3 hours as needed for pain or other (Moderate to Severe)                                prenatal multivitamin plus iron 27-0.8 MG Tabs per tablet   Take 1 tablet by mouth daily

## 2017-11-29 NOTE — ANESTHESIA POSTPROCEDURE EVALUATION
Patient: Jennifer Orlando    Procedure(s):  Dilation and Curettage Suction - Wound Class: II-Clean Contaminated    Diagnosis:miscarriage  Diagnosis Additional Information: No value filed.    Anesthesia Type:  MAC    Note:  Anesthesia Post Evaluation    Patient location during evaluation: Phase 2 and Bedside  Patient participation: Able to fully participate in evaluation  Level of consciousness: awake  Pain management: adequate  Airway patency: patent  Cardiovascular status: acceptable  Respiratory status: acceptable  Hydration status: acceptable  PONV: none     Anesthetic complications: None          Last vitals:  Vitals:    11/29/17 1038   BP: 142/77   Pulse: 75   Resp: 18   Temp: 36.7  C (98  F)   SpO2: 99%         Electronically Signed By: Gary Fall CRNA, APRN CRNA  November 29, 2017  12:16 PM

## 2017-12-01 LAB — COPATH REPORT: NORMAL

## 2017-12-10 ENCOUNTER — HOSPITAL ENCOUNTER (EMERGENCY)
Facility: CLINIC | Age: 27
Discharge: HOME | End: 2017-12-10
Attending: EMERGENCY MEDICINE | Admitting: EMERGENCY MEDICINE
Payer: COMMERCIAL

## 2017-12-10 ENCOUNTER — NURSE TRIAGE (OUTPATIENT)
Dept: NURSING | Facility: CLINIC | Age: 27
End: 2017-12-10

## 2017-12-10 ENCOUNTER — APPOINTMENT (OUTPATIENT)
Dept: ULTRASOUND IMAGING | Facility: CLINIC | Age: 27
End: 2017-12-10
Attending: EMERGENCY MEDICINE
Payer: COMMERCIAL

## 2017-12-10 VITALS
TEMPERATURE: 97.8 F | SYSTOLIC BLOOD PRESSURE: 130 MMHG | OXYGEN SATURATION: 98 % | DIASTOLIC BLOOD PRESSURE: 68 MMHG | HEART RATE: 89 BPM | HEIGHT: 66 IN | RESPIRATION RATE: 18 BRPM

## 2017-12-10 DIAGNOSIS — N93.9 UTERINE BLEEDING: ICD-10-CM

## 2017-12-10 PROCEDURE — 99284 EMERGENCY DEPT VISIT MOD MDM: CPT | Mod: 25 | Performed by: EMERGENCY MEDICINE

## 2017-12-10 PROCEDURE — S0191 MISOPROSTOL, ORAL, 200 MCG: HCPCS | Performed by: EMERGENCY MEDICINE

## 2017-12-10 PROCEDURE — 99284 EMERGENCY DEPT VISIT MOD MDM: CPT | Mod: Z6 | Performed by: EMERGENCY MEDICINE

## 2017-12-10 PROCEDURE — 76830 TRANSVAGINAL US NON-OB: CPT

## 2017-12-10 PROCEDURE — 25000132 ZZH RX MED GY IP 250 OP 250 PS 637: Performed by: EMERGENCY MEDICINE

## 2017-12-10 RX ORDER — MISOPROSTOL 200 UG/1
600 TABLET ORAL ONCE
Status: COMPLETED | OUTPATIENT
Start: 2017-12-10 | End: 2017-12-10

## 2017-12-10 RX ORDER — TRANEXAMIC ACID 650 MG/1
1300 TABLET ORAL 2 TIMES DAILY
Qty: 12 TABLET | Refills: 0 | Status: SHIPPED | OUTPATIENT
Start: 2017-12-10 | End: 2017-12-13

## 2017-12-10 RX ADMIN — IBUPROFEN 600 MG: 400 TABLET ORAL at 22:11

## 2017-12-10 RX ADMIN — MISOPROSTOL 600 MCG: 200 TABLET ORAL at 22:11

## 2017-12-10 NOTE — TELEPHONE ENCOUNTER
Reason for Disposition    MODERATE vaginal bleeding (i.e., soaking 1 pad or tampon per hour and present > 6 hours)    Additional Information    Negative: Shock suspected (e.g., cold/pale/clammy skin, too weak to stand, low BP, rapid pulse)    Negative: Difficult to awaken or acting confused  (e.g., disoriented, slurred speech)    Negative: Passed out (i.e., lost consciousness, collapsed and was not responding)    Negative: Sounds like a life-threatening emergency to the triager    Negative: Followed a genital area injury    Negative: Pregnant > 20 weeks  (5 months or more)    Negative: Pregnant < 20 weeks  (less than 5 months)    Negative: Bleeding occurring > 12 months after menopause    Negative: Bleeding from sexual abuse or rape    Negative: [1] Vaginal discharge is main symptom AND [2] small amount of blood    Negative: SEVERE abdominal pain    Negative: SEVERE dizziness (e.g., unable to stand, requires support to walk, feels like passing out now)    Negative: SEVERE vaginal bleeding (i.e., soaking 2 pads or tampons per hour and present 2 or more hours)    Negative: Patient sounds very sick or weak to the triager    Protocols used: VAGINAL BLEEDING - ABNORMAL-ADULT-    Jennifer calls and says that she had a D & C, on 11/29/2017, and began vaginally bleeding 4-5 days ago. Pt. Says that today, she has been passing many clots, with the blood. Pt. Will have someone take her to the ER now.

## 2017-12-10 NOTE — ED AVS SNAPSHOT
[facility] Emergency Department    [address] [address]    Phone:  [phone]    Fax:  [phone]                                       Jennifer Orlando   MRN: [id number]    Department:  [facility] Emergency Department   Date of Visit:  12/10/2017           After Visit Summary Signature Page     I have received my discharge instructions, and my questions have been answered. I have discussed any challenges I see with this plan with the nurse or doctor.    ..........................................................................................................................................  Patient/Patient Representative Signature      ..........................................................................................................................................  Patient Representative Print Name and Relationship to Patient    ..................................................               ................................................  Date                                            Time    ..........................................................................................................................................  Reviewed by Signature/Title    ...................................................              ..............................................  Date                                                            Time

## 2017-12-10 NOTE — ED AVS SNAPSHOT
[facility] Emergency Department    [address] [address]    Phone:  [phone]    Fax:  [phone]                                       Jennifer Orlando   MRN: [id number]    Department:  [facility] Emergency Department   Date of Visit:  12/10/2017           Patient Information     Date Of Birth          1990        Your diagnoses for this visit were:     Uterine bleeding suspected retained product of conception        You were seen by Altaf Gutierres DO.        Discharge Instructions       Please contact Dr. Soni in the OB/GYN clinic tomorrow for an appointment to the next 1-2 days  In the emergency room received misoprostol 600 mcg dose.  This will cause uterine cramping.  TXA medication helps reduce bleeding.  1300 mg dose twice daily for 3 days  Ibuprofen may be used for cramping      24 Hour Appointment Hotline       To make an appointment at any [facility], call [phone] ([phone]). If you don't have a family doctor or clinic, we will help you find one. [place] clinics are conveniently located to serve the needs of you and your family.             Review of your medicines      START taking        Dose / Directions Last dose taken    tranexamic acid 650 MG tablet   Commonly known as:  LYSTEDA   Dose:  1300 mg   Quantity:  12 tablet        Take 2 tablets (1,300 mg) by mouth 2 times daily for 3 days   Refills:  0          Our records show that you are taking the medicines listed below. If these are incorrect, please call your family doctor or clinic.        Dose / Directions Last dose taken    acetaminophen 325 MG tablet   Commonly known as:  TYLENOL   Dose:  650 mg   Quantity:  100 tablet        Take 2 tablets (650 mg) by mouth every 4 hours as needed for other (mild pain)   Refills:  0        FOLIC ACID PO   Dose:  800 mcg        Take 800 mcg by mouth daily   Refills:  0        ibuprofen 600 MG tablet   Commonly known as:  ADVIL/MOTRIN   Dose:  600  mg   Quantity:  30 tablet        Take 1 tablet (600 mg) by mouth every 6 hours as needed for pain (mild)   Refills:  0        oxyCODONE IR 5 MG tablet   Commonly known as:  ROXICODONE   Dose:  5-10 mg   Quantity:  10 tablet        Take 1-2 tablets (5-10 mg) by mouth every 3 hours as needed for pain or other (Moderate to Severe)   Refills:  0        prenatal multivitamin plus iron 27-0.8 MG Tabs per tablet   Dose:  1 tablet        Take 1 tablet by mouth daily   Refills:  0                Prescriptions were sent or printed at these locations (1 Prescription)                   Other Prescriptions                Printed at Department/Unit printer (1 of 1)         tranexamic acid (LYSTEDA) 650 MG tablet                Procedures and tests performed during your visit     US Pelvic Complete w Transvaginal      Orders Needing Specimen Collection     None      Pending Results     No orders found from 12/8/2017 to 12/11/2017.            Pending Culture Results     No orders found from 12/8/2017 to 12/11/2017.            Pending Results Instructions     If you had any lab results that were not finalized at the time of your Discharge, you can call the ED Lab Result RN at [phone]. You will be contacted by this team for any positive Lab results or changes in treatment. The nurses are available 7 days a week from 10A to 6:30P.  You can leave a message 24 hours per day and they will return your call.        Test Results From Your Hospital Stay        12/10/2017  9:18 PM      Narrative     US PELVIC COMPLETE WITH TRANSVAGINAL  12/10/2017 9:08 PM     HISTORY:  Status post D&C for spontaneous AB.  Heavy bleeding 1  week postprocedure.  Evaluation for retained product of conception;     COMPARISON: None.    TECHNIQUE: Transabdominal and transvaginal imaging was performed.  Transvaginal exam performed to better evaluate the uterus, ovaries and  adnexa.    FINDINGS: The endometrium measures 2.3 cm in thickness. There  is  inhomogeneous material within the endometrial cavity. There is  increased blood flow within this inhomogeneous material. The findings  are compatible with retained products of conception. Right ovary  measures 3.5 x 3 x 3.2 cm. There is a 2 x 1.7 x 1.7 cm cyst in the  right ovary. The left ovary measures 2.7 x 1.9 x 2.9 cm. No free  fluid. No adnexal mass.        Impression     IMPRESSION: Thickened endometrium at 2.3 cm with inhomogeneous  material within the endometrial cavity. This material demonstrates  increased blood flow. These findings would be consistent with retained  products of conception.     SAAD BRANDON MD                Thank you for choosing [facility]       Thank you for choosing [facility] for your care. Our goal is always to provide you with excellent care. Hearing back from our patients is one way we can continue to improve our services. Please take a few minutes to complete the written survey that you may receive in the mail after you visit with us. Thank you!        [employer] Networks[employer] Information     [employer] gives you secure access to your electronic health record. If you see a primary care provider, you can also send messages to your care team and make appointments. If you have questions, please call your primary care clinic.  If you do not have a primary care provider, please call [phone] and they will assist you.        Care EveryWhere ID     This is your Care EveryWhere ID. This could be used by other organizations to access your [facility] medical records  [id number]        Equal Access to Services     GRISEL TOURE : Hadii aad ku hadasho Sorhinaali, waaxda luqadaha, qaybta kaalmada angelica, loyda boucher. So [facility] [phone].    ATENCIÓN: Si habla español, tiene a larsen disposición servicios gratuitos de asistencia lingüística. Llame al [phone].    We comply with applicable federal civil rights laws and [place] laws. We do not discriminate on the basis of race,  color, national origin, age, disability, sex, sexual orientation, or gender identity.            After Visit Summary       This is your record. Keep this with you and show to your community pharmacist(s) and doctor(s) at your next visit.

## 2017-12-11 ENCOUNTER — OFFICE VISIT (OUTPATIENT)
Dept: OBGYN | Facility: CLINIC | Age: 27
End: 2017-12-11
Payer: COMMERCIAL

## 2017-12-11 ENCOUNTER — TELEPHONE (OUTPATIENT)
Dept: OBGYN | Facility: CLINIC | Age: 27
End: 2017-12-11

## 2017-12-11 VITALS
HEART RATE: 90 BPM | DIASTOLIC BLOOD PRESSURE: 69 MMHG | HEIGHT: 66 IN | SYSTOLIC BLOOD PRESSURE: 145 MMHG | TEMPERATURE: 98.2 F | BODY MASS INDEX: 35.68 KG/M2 | WEIGHT: 222 LBS

## 2017-12-11 DIAGNOSIS — O02.1 MISSED AB: Primary | ICD-10-CM

## 2017-12-11 PROCEDURE — 99024 POSTOP FOLLOW-UP VISIT: CPT | Performed by: OBSTETRICS & GYNECOLOGY

## 2017-12-11 RX ORDER — MISOPROSTOL 200 UG/1
200 TABLET ORAL
Qty: 4 TABLET | Refills: 0 | Status: SHIPPED | OUTPATIENT
Start: 2017-12-11 | End: 2018-01-03

## 2017-12-11 NOTE — TELEPHONE ENCOUNTER
Pt calling to talk  To the RN about the clotting and bleeding she has been having since the D & C she had.  Pt was seen in the ER this weekend and was told to be seen by a MD, pt only wants to see Dr. Soni.    Please call-     Brittany Mcgee  Clinic Station

## 2017-12-11 NOTE — ED PROVIDER NOTES
History     Chief Complaint   Patient presents with     Vaginal Bleeding     had a d&c on  for miscarriage, bleeding had stopped and then started about 3-4 days ago. approx 1 pad an hour today     HPI  Jennifer Orlando is a 27 year old female who presents for reassessment of return of heavy vaginal bleeding.  .  At 10 weeks 6 days patient found of missed  during her clinic visit.  Underwent a D&C with Dr. Triny Soni on .  Scant bleeding post procedure now presenting with bleeding requiring change of pads every 1-2 hours.  Mild abdominal cramping.  No fever.  Denies dysuria.    Operative report reviewed.    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     Priority: Medium     Obesity 2013     Priority: Medium     CARDIOVASCULAR SCREENING; LDL GOAL LESS THAN 160 10/10/2012     Priority: Medium     Menstrual migraine 2012     Priority: Medium     Elevated blood pressure reading without diagnosis of hypertension 2012     Priority: Medium        Past Medical History:    Past Medical History:   Diagnosis Date     Chickenpox      Obese      Otitis media        Past Surgical History:    Past Surgical History:   Procedure Laterality Date     DILATION AND CURETTAGE SUCTION N/A 2017    Procedure: DILATION AND CURETTAGE SUCTION;  Dilation and Curettage Suction;  Surgeon: Triny Soni MD;  Location: [facility] OR     PE TUBES      x 2       Family History:    Family History   Problem Relation Age of Onset     Hypertension Mother      DIABETES Mother      DIABETES Maternal Grandmother      Hypertension Maternal Grandmother      Hypertension Maternal Grandfather      Autoimmune Disease Maternal Grandfather      lupus     Hypertension Paternal Grandmother      HEART DISEASE Paternal Grandmother      stroke     Cancer - colorectal Paternal Grandfather      in his 70's     GASTROINTESTINAL DISEASE  "Brother      stomach ulcer       Social History:  Marital Status:   [2]  Social History   Substance Use Topics     Smoking status: Never Smoker     Smokeless tobacco: Never Used     Alcohol use 0.0 oz/week     0 Standard drinks or equivalent per week      Comment: occas.- quit with pregnancy        Medications:      acetaminophen (TYLENOL) 325 MG tablet   ibuprofen (ADVIL/MOTRIN) 600 MG tablet   oxyCODONE IR (ROXICODONE) 5 MG tablet   Prenatal Vit-Fe Fumarate-FA (PRENATAL MULTIVITAMIN PLUS IRON) 27-0.8 MG TABS per tablet   FOLIC ACID PO         Review of Systems  All pertinent positives and negatives are documented in the HPI.  All others reviewed and are negative .    Physical Exam   BP: 150/85  Pulse: 88  Temp: 97.8  F (36.6  C)  Resp: 16  Height: 167.6 cm (5' 6\")  SpO2: 99 %      Physical Exam  Vital signs reviewed  Abdomen is nontender  No uterine tenderness  No uterine heavy bleeding-     ED Course     ED Course     Procedures                 Assessments & Plan (with Medical Decision Making)  27-year-old female.  .  Presents 1 week post D&C for spontaneous AB.  Having return of heavy bleeding.  Examination showed no endometritis.  Hemodynamically stable and not orthostatic.  No other acute abdominal findings.  Ultrasound findings as above are concerning for retained products of conception.  Plan to treat patient with misoprostol 600 mcg dose in the ED along with ibuprofen 600 mg.  Discharge TXA 1300 mg twice daily for 3 days.  OB/GYN recheck in the next 1-2 days.       I have reviewed the nursing notes.    I have reviewed the findings, diagnosis, plan and need for follow up with the patient.      New Prescriptions    No medications on file       Final diagnoses:   Uterine bleeding suspected retained product of conception       12/10/2017   [facility] EMERGENCY DEPARTMENT     Altaf Gutierres, DO  12/10/17 2157    "

## 2017-12-11 NOTE — NURSING NOTE
"Chief Complaint   Patient presents with     Surgical Followup     vaginal bleeding post D&C 11/29/17       Initial /69 (BP Location: Left arm, Patient Position: Chair, Cuff Size: Adult Large)  Pulse 90  Temp 98.2  F (36.8  C) (Oral)  Ht 5' 6\" (1.676 m)  Wt 222 lb (100.7 kg)  LMP 09/14/2017  Breastfeeding? No  BMI 35.83 kg/m2 Estimated body mass index is 35.83 kg/(m^2) as calculated from the following:    Height as of this encounter: 5' 6\" (1.676 m).    Weight as of this encounter: 222 lb (100.7 kg).  Medication Reconciliation: complete   Juliet Burkett CMA      "

## 2017-12-11 NOTE — TELEPHONE ENCOUNTER
Just wanted to double check on the Misoprostol order - it reads to place one tablet vaginally as needed; appears to be for a missed  - usually I see higher one time doses of misoprostol - did you want just 200 mcg as sent or did you intend to send 800 mcg (4 tabs) placed vaginally?  Thanks.  Amparo Carvalho, PharmD  [facility]  [phone]

## 2017-12-11 NOTE — DISCHARGE INSTRUCTIONS
Please contact Dr. Soni in the OB/GYN clinic tomorrow for an appointment to the next 1-2 days  In the emergency room received misoprostol 600 mcg dose.  This will cause uterine cramping.  TXA medication helps reduce bleeding.  1300 mg dose twice daily for 3 days  Ibuprofen may be used for cramping

## 2017-12-11 NOTE — ED NOTES
Pt presents with c/o vaginal bleeding. Had a D&C 11/29, had some bleeding that stopped after that. Bleeding started again on Friday like a typical menses with a few small clots, today bleeding to get worse, has been going through about 1 pad per hour, has used 3-4 pads today, bleeding got significantly worse after work today at 1400, has used 3 pads since then.

## 2017-12-11 NOTE — MR AVS SNAPSHOT
"              After Visit Summary   12/11/2017    Jennifer Orlando    MRN: [id number]           Patient Information     Date Of Birth          1990        Visit Information        Provider Department      12/11/2017 10:15 AM Esther Swanson MD [facility]        Today's Diagnoses     Missed ab    -  1       Follow-ups after your visit        Who to contact     If you have questions or need follow up information about today's clinic visit or your schedule please contact [facility] directly at [phone].  Normal or non-critical lab and imaging results will be communicated to you by Gaikaihart, letter or phone within 4 business days after the clinic has received the results. If you do not hear from us within 7 days, please contact the clinic through EnC[employer] or phone. If you have a critical or abnormal lab result, we will notify you by phone as soon as possible.  Submit refill requests through Chatterous or call your pharmacy and they will forward the refill request to us. Please allow 3 business days for your refill to be completed.          Additional Information About Your Visit        MyChart Information     [employer] gives you secure access to your electronic health record. If you see a primary care provider, you can also send messages to your care team and make appointments. If you have questions, please call your primary care clinic.  If you do not have a primary care provider, please call [phone] and they will assist you.        Care EveryWhere ID     This is your Care EveryWhere ID. This could be used by other organizations to access your [facility] medical records  [id number]        Your Vitals Were     Pulse Temperature Height Last Period Breastfeeding? BMI (Body Mass Index)    90 98.2  F (36.8  C) (Oral) 5' 6\" (1.676 m) 09/14/2017 No 35.83 kg/m2       Blood Pressure from Last 3 Encounters:   12/11/17 145/69   12/10/17 130/68   11/29/17 130/68    Weight from Last 3 " Encounters:   12/11/17 222 lb (100.7 kg)   11/29/17 225 lb (102.1 kg)   11/13/17 225 lb (102.1 kg)              Today, you had the following     No orders found for display         Today's Medication Changes          These changes are accurate as of: 12/11/17  1:27 PM.  If you have any questions, ask your nurse or doctor.               Start taking these medicines.        Dose/Directions    misoprostol 200 MCG tablet   Commonly known as:  CYTOTEC   Used for:  Missed ab   Started by:  Esther Swanson MD        Dose:  200 mcg   Place 1 tablet (200 mcg) vaginally once as needed   Quantity:  4 tablet   Refills:  0            Where to get your medicines      These medications were sent to [facility] [place] - [address]   [address] [address] [facility] [address]     Phone:  [phone]     misoprostol 200 MCG tablet                Primary Care Provider Office Phone # Fax #    Philipp Pringle [phone] [address] [facility] [address]        Equal Access to Services     [facility]: Hadii aad ku hadasho Soomaali, waaxda luqadaha, qaybta kaalmada adeegyada, loyda rivera . So [place] [phone].    ATENCIÓN: Si habla español, tiene a larsen disposición servicios gratuitos de asistencia lingüística. Cl[place] al [phone].    We comply with applicable federal civil rights laws and [place] laws. We do not discriminate on the basis of race, color, national origin, age, disability, sex, sexual orientation, or gender identity.            Thank you!     Thank you for choosing [facility]  for your care. Our goal is always to provide you with excellent care. Hearing back from our patients is one way we can continue to improve our services. Please take a few minutes to complete the written survey that you may receive in the mail after your visit with us. Thank you!             Your Updated Medication List - Protect others around  you: Learn how to safely use, store and throw away your medicines at www.disposemymeds.org.          This list is accurate as of: 12/11/17  1:27 PM.  Always use your most recent med list.                   Brand Name Dispense Instructions for use Diagnosis    acetaminophen 325 MG tablet    TYLENOL    100 tablet    Take 2 tablets (650 mg) by mouth every 4 hours as needed for other (mild pain)    Miscarriage       FOLIC ACID PO      Take 800 mcg by mouth daily        ibuprofen 600 MG tablet    ADVIL/MOTRIN    30 tablet    Take 1 tablet (600 mg) by mouth every 6 hours as needed for pain (mild)    Miscarriage       misoprostol 200 MCG tablet    CYTOTEC    4 tablet    Place 1 tablet (200 mcg) vaginally once as needed    Missed ab       oxyCODONE IR 5 MG tablet    ROXICODONE    10 tablet    Take 1-2 tablets (5-10 mg) by mouth every 3 hours as needed for pain or other (Moderate to Severe)    Miscarriage       prenatal multivitamin plus iron 27-0.8 MG Tabs per tablet      Take 1 tablet by mouth daily        tranexamic acid 650 MG tablet    LYSTEDA    12 tablet    Take 2 tablets (1,300 mg) by mouth 2 times daily for 3 days

## 2017-12-11 NOTE — TELEPHONE ENCOUNTER
"S-(situation): Heavy vaginal bleeding, seen in ER last evening and advised that she is to f/u with ob/gyn 1-2 days.    B-(background): S/P D&C spontaneous miscarriage 11-29-17.  Seen in ER 12-12-17.    A-(assessment): Was to ER last evening heavy bleeding and \"passing big clots the size of the cup of my hand\".  She denies dizziness, lgt headedness or sob.  States, \"I am not having any pain at this time\".  Was given Cytotec and bleeding has seemed to slow down.       R-(recommendations): Dr. Swanson has opening at 1030 and appt was scheduled.    Edilma Radford  [facility] RN,mc    "

## 2017-12-11 NOTE — PROGRESS NOTES
"  SUBJECTIVE:                                                   CC:  Patient presents with:  Surgical Followup: vaginal bleeding post D&C 17      HPI:  Jennifer Orlando is a 27 year old  who presents s/p D&C for missed AB on , had recurrent bleeding and was seen in the ED last night, US showed potential retained POC.  She was given 600 mcg oral cytotec in the ED, and prescribed TXA although she hasn't picked it up yet.  She passed two large clots since and the bleeding has slowed down.      ROS: 10 point ROS negative other than as listed above in HPI.    Gyn History:  Patient's last menstrual period was 2017.     Recent pap smears:    Lab Results   Component Value Date    PAP NIL 2016    PAP NIL 2012       PMH, PSH, Soc Hx, Fam Hx, Meds, and allergies reviewed in Epic.    OBJECTIVE:     /69 (BP Location: Left arm, Patient Position: Chair, Cuff Size: Adult Large)  Pulse 90  Temp 98.2  F (36.8  C) (Oral)  Ht 5' 6\" (1.676 m)  Wt 222 lb (100.7 kg)  LMP 2017  Breastfeeding? No  BMI 35.83 kg/m2    Gen: Healthy appearing obese female, no acute distress, comfortable  HENT: No scleral injection or icterus, no conjunctival pallor  CV: Regular rate, no hypotension, good capillary refill  Resp: Normal work of breathing, no cough  GI: Abdomen soft, non-tender. No masses, organomegaly  Skin: No suspicious lesions or rashes  Psychiatric: mentation appears normal and affect bright    Test Results:  Results for orders placed or performed during the hospital encounter of 12/10/17 (from the past 24 hour(s))   US Pelvic Complete w Transvaginal    Narrative    US PELVIC COMPLETE WITH TRANSVAGINAL  12/10/2017 9:08 PM     HISTORY:  Status post D&C for spontaneous AB.  Heavy bleeding 1  week postprocedure.  Evaluation for retained product of conception;     COMPARISON: None.    TECHNIQUE: Transabdominal and transvaginal imaging was performed.  Transvaginal exam performed to better " evaluate the uterus, ovaries and  adnexa.    FINDINGS: The endometrium measures 2.3 cm in thickness. There is  inhomogeneous material within the endometrial cavity. There is  increased blood flow within this inhomogeneous material. The findings  are compatible with retained products of conception. Right ovary  measures 3.5 x 3 x 3.2 cm. There is a 2 x 1.7 x 1.7 cm cyst in the  right ovary. The left ovary measures 2.7 x 1.9 x 2.9 cm. No free  fluid. No adnexal mass.      Impression    IMPRESSION: Thickened endometrium at 2.3 cm with inhomogeneous  material within the endometrial cavity. This material demonstrates  increased blood flow. These findings would be consistent with retained  products of conception.     SAAD BRANDON MD       ASSESSMENT/PLAN:                                                      1. Missed ab  Discussed expected course after the D&C.  Discussed repeated dosage of cytotec if having bleeding again, and also can  the TXA and take this as well if having increased bleeding.  She is vitally stable, and her hgb wasn't even checked last night in the ED.  She has had all of her questions answered.  - misoprostol (CYTOTEC) 200 MCG tablet; Place 4 tablet (200 mcg) vaginally once as needed  Dispense: 4 tablet; Refill: 0    Esther Swanson MD, MPH  Obstetrics and Gynecology     Total time spent was 15 minutes; greater than 50% of time was spent in counseling and/or coordination of care for the above listed diagnoses, not including time spent on the procedure.

## 2017-12-29 ENCOUNTER — HOSPITAL ENCOUNTER (EMERGENCY)
Facility: CLINIC | Age: 27
Discharge: HOME | End: 2017-12-29
Attending: STUDENT IN AN ORGANIZED HEALTH CARE EDUCATION/TRAINING PROGRAM | Admitting: STUDENT IN AN ORGANIZED HEALTH CARE EDUCATION/TRAINING PROGRAM
Payer: COMMERCIAL

## 2017-12-29 VITALS
HEIGHT: 66 IN | RESPIRATION RATE: 16 BRPM | WEIGHT: 220 LBS | SYSTOLIC BLOOD PRESSURE: 105 MMHG | BODY MASS INDEX: 35.36 KG/M2 | HEART RATE: 94 BPM | DIASTOLIC BLOOD PRESSURE: 77 MMHG | TEMPERATURE: 98.9 F | OXYGEN SATURATION: 98 %

## 2017-12-29 DIAGNOSIS — T78.3XXA ANGIOEDEMA, INITIAL ENCOUNTER: ICD-10-CM

## 2017-12-29 DIAGNOSIS — L50.9 URTICARIA: ICD-10-CM

## 2017-12-29 PROCEDURE — 25000125 ZZHC RX 250: Performed by: STUDENT IN AN ORGANIZED HEALTH CARE EDUCATION/TRAINING PROGRAM

## 2017-12-29 PROCEDURE — 99284 EMERGENCY DEPT VISIT MOD MDM: CPT | Mod: 25 | Performed by: STUDENT IN AN ORGANIZED HEALTH CARE EDUCATION/TRAINING PROGRAM

## 2017-12-29 PROCEDURE — 96361 HYDRATE IV INFUSION ADD-ON: CPT | Performed by: STUDENT IN AN ORGANIZED HEALTH CARE EDUCATION/TRAINING PROGRAM

## 2017-12-29 PROCEDURE — 25000132 ZZH RX MED GY IP 250 OP 250 PS 637: Performed by: STUDENT IN AN ORGANIZED HEALTH CARE EDUCATION/TRAINING PROGRAM

## 2017-12-29 PROCEDURE — 25000128 H RX IP 250 OP 636: Performed by: STUDENT IN AN ORGANIZED HEALTH CARE EDUCATION/TRAINING PROGRAM

## 2017-12-29 PROCEDURE — 96372 THER/PROPH/DIAG INJ SC/IM: CPT | Performed by: STUDENT IN AN ORGANIZED HEALTH CARE EDUCATION/TRAINING PROGRAM

## 2017-12-29 PROCEDURE — 96374 THER/PROPH/DIAG INJ IV PUSH: CPT | Performed by: STUDENT IN AN ORGANIZED HEALTH CARE EDUCATION/TRAINING PROGRAM

## 2017-12-29 PROCEDURE — 96375 TX/PRO/DX INJ NEW DRUG ADDON: CPT | Performed by: STUDENT IN AN ORGANIZED HEALTH CARE EDUCATION/TRAINING PROGRAM

## 2017-12-29 PROCEDURE — 99284 EMERGENCY DEPT VISIT MOD MDM: CPT | Mod: Z6 | Performed by: STUDENT IN AN ORGANIZED HEALTH CARE EDUCATION/TRAINING PROGRAM

## 2017-12-29 RX ORDER — EPINEPHRINE 0.3 MG/.3ML
0.3 INJECTION SUBCUTANEOUS
Qty: 0.6 ML | Refills: 1 | Status: SHIPPED | OUTPATIENT
Start: 2017-12-29 | End: 2017-12-30

## 2017-12-29 RX ORDER — METHYLPREDNISOLONE SODIUM SUCCINATE 125 MG/2ML
125 INJECTION, POWDER, LYOPHILIZED, FOR SOLUTION INTRAMUSCULAR; INTRAVENOUS ONCE
Status: COMPLETED | OUTPATIENT
Start: 2017-12-29 | End: 2017-12-29

## 2017-12-29 RX ORDER — LORATADINE 10 MG/1
10 TABLET ORAL ONCE
Status: COMPLETED | OUTPATIENT
Start: 2017-12-29 | End: 2017-12-29

## 2017-12-29 RX ORDER — PSEUDOEPHEDRINE HCL 120 MG/1
120 TABLET, FILM COATED, EXTENDED RELEASE ORAL ONCE
Status: COMPLETED | OUTPATIENT
Start: 2017-12-29 | End: 2017-12-29

## 2017-12-29 RX ADMIN — SODIUM CHLORIDE 1000 ML: 9 INJECTION, SOLUTION INTRAVENOUS at 04:12

## 2017-12-29 RX ADMIN — FAMOTIDINE 20 MG: 10 INJECTION, SOLUTION INTRAVENOUS at 04:13

## 2017-12-29 RX ADMIN — LORATADINE 10 MG: 10 TABLET ORAL at 05:18

## 2017-12-29 RX ADMIN — PSEUDOEPHEDRINE HYDROCHLORIDE 120 MG: 120 TABLET, FILM COATED, EXTENDED RELEASE ORAL at 05:18

## 2017-12-29 RX ADMIN — EPINEPHRINE 0.3 MG: 1 INJECTION, SOLUTION, CONCENTRATE INTRAVENOUS at 04:06

## 2017-12-29 RX ADMIN — METHYLPREDNISOLONE SODIUM SUCCINATE 125 MG: 125 INJECTION, POWDER, FOR SOLUTION INTRAMUSCULAR; INTRAVENOUS at 04:12

## 2017-12-29 NOTE — ED PROVIDER NOTES
History     Chief Complaint   Patient presents with     Allergic Reaction     started around 6pm; started wtih itching in back of head, lip swelling, hives covering extremities, eyelid swelling.      HPI  Jennifer Orlando is a 27 year old female who presents for evaluation of body wide rash with lip swelling.  Patient explains that around 6 PM she developed some itchy swelling along the back of her head so she decided to take 2 tablets of Benadryl.  Throughout the night more pruritic areas developed along her face, torso, and bilateral upper extremities.  She believes she is having an allergic reaction but is not aware of any new exposures.  When she awoke at 3 AM she noticed some lower lip swelling so decided to take her 50 mg of oral Benadryl before coming to the department.  She denies recent fever/chills, tongue swelling, throat irritation, difficulty breathing, chest pain, abdominal pain, nausea/vomiting, diarrhea, or other concerns.  She has never had a similar reaction in the past.      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     Priority: Medium     Obesity 04/22/2013     Priority: Medium     CARDIOVASCULAR SCREENING; LDL GOAL LESS THAN 160 10/10/2012     Priority: Medium     Menstrual migraine 08/13/2012     Priority: Medium     Elevated blood pressure reading without diagnosis of hypertension 08/13/2012     Priority: Medium        Past Medical History:    Past Medical History:   Diagnosis Date     Chickenpox      Obese      Otitis media        Past Surgical History:    Past Surgical History:   Procedure Laterality Date     DILATION AND CURETTAGE SUCTION N/A 11/29/2017    Procedure: DILATION AND CURETTAGE SUCTION;  Dilation and Curettage Suction;  Surgeon: Triny Soni MD;  Location: [facility] OR     PE TUBES      x 2       Family History:    Family History   Problem Relation Age of Onset     Hypertension Mother       "DIABETES Mother      DIABETES Maternal Grandmother      Hypertension Maternal Grandmother      Hypertension Maternal Grandfather      Autoimmune Disease Maternal Grandfather      lupus     Hypertension Paternal Grandmother      HEART DISEASE Paternal Grandmother      stroke     Cancer - colorectal Paternal Grandfather      in his 70's     GASTROINTESTINAL DISEASE Brother      stomach ulcer       Social History:  Marital Status:   [2]  Social History   Substance Use Topics     Smoking status: Never Smoker     Smokeless tobacco: Never Used     Alcohol use No      Comment: occas.- quit with pregnancy        Medications:      EPINEPHrine (EPIPEN/ADRENACLICK/OR ANY BX GENERIC EQUIV) 0.3 MG/0.3ML injection 2-pack   misoprostol (CYTOTEC) 200 MCG tablet   acetaminophen (TYLENOL) 325 MG tablet   ibuprofen (ADVIL/MOTRIN) 600 MG tablet   oxyCODONE IR (ROXICODONE) 5 MG tablet   Prenatal Vit-Fe Fumarate-FA (PRENATAL MULTIVITAMIN PLUS IRON) 27-0.8 MG TABS per tablet   FOLIC ACID PO         Review of Systems  Constitutional: Negative for fever or recent illness.  HENT: Positive for lower lip swelling.  Negative for tongue swelling or oral pain.  Respiratory: Negative for cough or shortness of breath.  Cardiovascular: Negative for chest pain.  Gastrointestinal: Negative for abdominal pain, nausea, vomiting, or diarrhea.   Neurological: Negative for headache or dizziness.  Skin: Positive for pruritic rash.    All others reviewed and are negative.      Physical Exam   BP: 136/74  Pulse: 94  Temp: 98.9  F (37.2  C)  Resp: 16  Height: 167.6 cm (5' 6\")  Weight: 99.8 kg (220 lb)  SpO2: 100 %      Physical Exam  Constitutional: Well developed, well nourished. Appears nontoxic and in no acute distress. .  Head: Moderate lower lip angioedema, forehead urticaria. Atraumatic.  Eyes: Conjunctivae are normal.  Oral: Patient is without trismus. Moist oral mucosa. Negative pharyngeal erythema. No uvular asymmetry. Without sublingual or " submental swelling. Voice is not muffled.   Neck: Neck supple.  Cardiovascular: No cyanosis. RRR. No audible murmurs noted.   Respiratory: Effort normal, no respiratory distress. CTAB without diminished regions. No wheezing, rhonchi, or crackles.  Gastrointestinal: Soft, nondistended abdomen. Nontender and without guarding. No rigidity or rebound tenderness. Negative McBurney's point. Negative for Rose's sign.   Musculoskeletal: Moves all extremities spontaneously and without complaint.  Neuro: Patient is alert.  Skin: Skin is warm and dry, not diaphoretic. Urticarial lesions along head, face, torso, and upper extremities.  Psych: Appears to have a normal mood and affect.      ED Course     ED Course     Procedures               Critical Care time:  none               Labs Ordered and Resulted from Time of ED Arrival Up to the Time of Departure from the ED - No data to display    Assessments & Plan (with Medical Decision Making)   Jennifer Orlando is a 27 year old female who presents to the department for evaluation of pruritic urticarial rash of face, torso, and upper extremities.  She also has moderate angioedema of the lower lip which is not affecting her tongue or oral airway.  She had taken 50 mg of oral Benadryl prior to arrival.  In the department she was given a single IM dose of 0.3 mg epinephrine along with IV fluids, famotidine, and Solu-Medrol.  Her symptoms gradually improved during her stay in the department.  She is still unaware of new exposures which could have caused an allergic-like reaction.  She will be discharged with prescription for epinephrine pen as well as contact information to schedule follow-up with allergy clinic.  Recommend she monitor closely for any signs of rebound.  Prior to discharge, I made it clear that illness can unexpectedly develop/progress so she has been instructed to return to the emergency department for reevaluation of evolving symptoms, change in severity, difficulty  breathing, or other concerns. She seems comfortable with the discharge plan we discussed including follow up.    Disclaimer: This note consists of symbols derived from keyboarding, dictation, and/or voice recognition software. As a result, there may be errors in the script that have gone undetected.  Please consider this when interpreting information found in the chart.        I have reviewed the nursing notes.    I have reviewed the findings, diagnosis, plan and need for follow up with the patient.       Discharge Medication List as of 12/29/2017  6:00 AM      START taking these medications    Details   EPINEPHrine (EPIPEN/ADRENACLICK/OR ANY BX GENERIC EQUIV) 0.3 MG/0.3ML injection 2-pack Inject 0.3 mLs (0.3 mg) into the muscle once as needed for anaphylaxis, Disp-0.6 mL, R-1, Local Print             Final diagnoses:   Urticaria   Angioedema, initial encounter       12/29/2017   [facility] EMERGENCY DEPARTMENT     Bud Watts, DO  12/29/17 0614

## 2017-12-29 NOTE — ED AVS SNAPSHOT
[facility] Emergency Department    [address] [address]    Phone:  [phone]    Fax:  [phone]                                       Jennifer Orlando   MRN: [id number]    Department:  [facility] Emergency Department   Date of Visit:  12/29/2017           Patient Information     Date Of Birth          1990        Your diagnoses for this visit were:     Urticaria     Angioedema, initial encounter        You were seen by Bud Watts DO.      Follow-up Information     Follow up with [facility]. Schedule an appointment as soon as possible for a visit in 1 week.    Specialty:  Allergy    Why:  Followup for reevaluation and managment plan.    Contact information:    [address] [place] [address] [phone]    Additional information:    The medical center is located at   [address] [place] [address] (between [facility] and   [address], [place] miles north   of [place]).        Go to [facility] Emergency Department.    Specialty:  EMERGENCY MEDICINE    Why:  Return if symptoms return/progress.    Contact information:    [address] [facility] [address] [phone]    Additional information:    The medical center is located at   [address] [place] [address] (between [facility] and   [address], four miles north   of [place]).        Discharge Instructions         Hives (Adult)  Hives are pink or red bumps on the skin. These bumps are also known as wheals. The bumps can itch, burn, or sting. Hives can occur anywhere on the body. They vary in size and shape and can form in clusters. Individual hives can appear and go away quickly. New hives may develop as old ones fade. Hives are common and usually harmless. Occasionally hives are a sign of a serious allergy.  Hives are often caused by an allergic reaction. It may be an allergic reaction to foods such as fruit, shellfish, chocolate, nuts, or tomatoes. It may be a reaction  to pollens, animal fur, or mold spores. Medicines, chemicals, and insect bites can also cause hives. And hives can be caused by hot sun or cold air. The cause of hives can be difficult to find.  You may be given medicines to relieve swelling and itching. Follow all instructions when using these medicines. The hives will usually fade in a few days, but can last up to 2 weeks.  Home care  Follow these tips:    Try to find the cause of the hives and eliminate it. Discuss possible causes with your healthcare provider. Future reactions to the same allergen may be worse.    Don t scratch the hives. Scratching will delay healing. To reduce itching, apply cool, wet compresses to the skin.    Dress in soft, loose cotton clothing.    Don t bathe in hot water. This can make the itching worse.    Apply an ice pack or cool pack wrapped in a thin towel to your skin. This will help reduce redness and itching. But if your hives were caused by exposure to cold, then do not apply more cold to them.    You may use over-the counter antihistamines to reduce itching. Some older antihistamines, such as diphenhydramine and chlorpheniramine, are inexpensive. But they need to be taken often and may make you sleepy. They are best used at bedtime. Don t use diphenhydramine if you have glaucoma or have trouble urinating because of an enlarged prostate. Newer antihistamines, such as loratadine, cetirizine, and fexofenadine, are generally more expensive. But they tend to have fewer side effects, such as drowsiness. They can be taken less often.    Another type of antihistamine is used to treat heartburn. This type includes ranitidine, nizatidine, famotidine, and cimetidine. These are sometimes used along with the above antihistamines if a single medicine is not working.  Follow-up care  Follow up with your healthcare provider if your symptoms don't get better in 2 days. Ask your provider about allergy testing if you have had a severe reaction, or  have had several episodes of hives. He or she can use the allergy testing to find out what you are allergic to.  When to seek medical advice  Call your healthcare provider right away if any of these occur:    Fever of 100.4 F (38.0 C) or higher, or as directed by your healthcare provider    Redness, swelling, or pain    Foul-smelling fluid coming from the rash  Call 911  Call 911 if any of the following occur:    Swelling of the face, throat, or tongue    Trouble breathing or swallowing    Dizziness, weakness, or fainting  Date Last Reviewed: 9/1/2016 2000-2017 [employer]. [address] [place] [address]. All rights reserved. This information is not intended as a substitute for professional medical care. Always follow your healthcare professional's instructions.          24 Hour Appointment Hotline       To make an appointment at any [facility], call [phone] ([phone]). If you don't have a family doctor or clinic, we will help you find one. [facility] clinics are conveniently located to serve the needs of you and your family.             Review of your medicines      START taking        Dose / Directions Last dose taken    EPINEPHrine 0.3 MG/0.3ML injection 2-pack   Commonly known as:  EPIPEN/ADRENACLICK/or ANY BX GENERIC EQUIV   Dose:  0.3 mg   Quantity:  0.6 mL        Inject 0.3 mLs (0.3 mg) into the muscle once as needed for anaphylaxis   Refills:  1          Our records show that you are taking the medicines listed below. If these are incorrect, please call your family doctor or clinic.        Dose / Directions Last dose taken    acetaminophen 325 MG tablet   Commonly known as:  TYLENOL   Dose:  650 mg   Quantity:  100 tablet        Take 2 tablets (650 mg) by mouth every 4 hours as needed for other (mild pain)   Refills:  0        FOLIC ACID PO   Dose:  800 mcg        Take 800 mcg by mouth daily   Refills:  0        ibuprofen 600 MG tablet   Commonly known as:   ADVIL/MOTRIN   Dose:  600 mg   Quantity:  30 tablet        Take 1 tablet (600 mg) by mouth every 6 hours as needed for pain (mild)   Refills:  0        misoprostol 200 MCG tablet   Commonly known as:  CYTOTEC   Dose:  200 mcg   Quantity:  4 tablet        Place 1 tablet (200 mcg) vaginally once as needed   Refills:  0        oxyCODONE IR 5 MG tablet   Commonly known as:  ROXICODONE   Dose:  5-10 mg   Quantity:  10 tablet        Take 1-2 tablets (5-10 mg) by mouth every 3 hours as needed for pain or other (Moderate to Severe)   Refills:  0        prenatal multivitamin plus iron 27-0.8 MG Tabs per tablet   Dose:  1 tablet        Take 1 tablet by mouth daily   Refills:  0                Prescriptions were sent or printed at these locations (1 Prescription)                   Other Prescriptions                Printed at Department/Unit printer (1 of 1)         EPINEPHrine (EPIPEN/ADRENACLICK/OR ANY BX GENERIC EQUIV) 0.3 MG/0.3ML injection 2-pack                Orders Needing Specimen Collection     None      Pending Results     No orders found from 12/27/2017 to 12/30/2017.            Pending Culture Results     No orders found from 12/27/2017 to 12/30/2017.            Pending Results Instructions     If you had any lab results that were not finalized at the time of your Discharge, you can call the ED Lab Result RN at [phone]. You will be contacted by this team for any positive Lab results or changes in treatment. The nurses are available 7 days a week from 10A to 6:30P.  You can leave a message 24 hours per day and they will return your call.        Test Results From Your Hospital Stay               Thank you for choosing [facility]       Thank you for choosing [facility] for your care. Our goal is always to provide you with excellent care. Hearing back from our patients is one way we can continue to improve our services. Please take a few minutes to complete the written survey that you may receive in the mail after  you visit with us. Thank you!        [facility] Information     [facility] gives you secure access to your electronic health record. If you see a primary care provider, you can also send messages to your care team and make appointments. If you have questions, please call your primary care clinic.  If you do not have a primary care provider, please call [phone] and they will assist you.        Care EveryWhere ID     This is your Care EveryWhere ID. This could be used by other organizations to access your [facility] medical records  [id number]        Equal Access to Services     GRISEL TOURE : Samy Vasques, waandreas fajardo, godwin reisalalfonso dominguez, loyda rivera . So [facility] [phone].    ATENCIÓN: Si habla español, tiene a larsen disposición servicios gratuitos de asistencia lingüística. Llame al [phone].    We comply with applicable federal civil rights laws and [place] laws. We do not discriminate on the basis of race, color, national origin, age, disability, sex, sexual orientation, or gender identity.            After Visit Summary       This is your record. Keep this with you and show to your community pharmacist(s) and doctor(s) at your next visit.

## 2017-12-29 NOTE — ED NOTES
Pt reports onset of itching on back of neck last night at 6pm. Pt states she then noted lip swelling, eyelid swelling and hives on body. Pt has has hx of hives from pineapple but denies anything with this in it. Pt took benadryl at 10pm and 0300 without change to sx. Pt denies SOB or cough. Pt denies scratchy throat or difficulty swallowing.

## 2017-12-29 NOTE — ED AVS SNAPSHOT
[facility] Emergency Department    [address] [facility] [address]    Phone:  [phone]    Fax:  [phone]                                       Jennifer Orlando   MRN: [id number]    Department:  [facility] Emergency Department   Date of Visit:  12/29/2017           After Visit Summary Signature Page     I have received my discharge instructions, and my questions have been answered. I have discussed any challenges I see with this plan with the nurse or doctor.    ..........................................................................................................................................  Patient/Patient Representative Signature      ..........................................................................................................................................  Patient Representative Print Name and Relationship to Patient    ..................................................               ................................................  Date                                            Time    ..........................................................................................................................................  Reviewed by Signature/Title    ...................................................              ..............................................  Date                                                            Time

## 2017-12-29 NOTE — DISCHARGE INSTRUCTIONS
Hives (Adult)  Hives are pink or red bumps on the skin. These bumps are also known as wheals. The bumps can itch, burn, or sting. Hives can occur anywhere on the body. They vary in size and shape and can form in clusters. Individual hives can appear and go away quickly. New hives may develop as old ones fade. Hives are common and usually harmless. Occasionally hives are a sign of a serious allergy.  Hives are often caused by an allergic reaction. It may be an allergic reaction to foods such as fruit, shellfish, chocolate, nuts, or tomatoes. It may be a reaction to pollens, animal fur, or mold spores. Medicines, chemicals, and insect bites can also cause hives. And hives can be caused by hot sun or cold air. The cause of hives can be difficult to find.  You may be given medicines to relieve swelling and itching. Follow all instructions when using these medicines. The hives will usually fade in a few days, but can last up to 2 weeks.  Home care  Follow these tips:    Try to find the cause of the hives and eliminate it. Discuss possible causes with your healthcare provider. Future reactions to the same allergen may be worse.    Don t scratch the hives. Scratching will delay healing. To reduce itching, apply cool, wet compresses to the skin.    Dress in soft, loose cotton clothing.    Don t bathe in hot water. This can make the itching worse.    Apply an ice pack or cool pack wrapped in a thin towel to your skin. This will help reduce redness and itching. But if your hives were caused by exposure to cold, then do not apply more cold to them.    You may use over-the counter antihistamines to reduce itching. Some older antihistamines, such as diphenhydramine and chlorpheniramine, are inexpensive. But they need to be taken often and may make you sleepy. They are best used at bedtime. Don t use diphenhydramine if you have glaucoma or have trouble urinating because of an enlarged prostate. Newer antihistamines, such as  loratadine, cetirizine, and fexofenadine, are generally more expensive. But they tend to have fewer side effects, such as drowsiness. They can be taken less often.    Another type of antihistamine is used to treat heartburn. This type includes ranitidine, nizatidine, famotidine, and cimetidine. These are sometimes used along with the above antihistamines if a single medicine is not working.  Follow-up care  Follow up with your healthcare provider if your symptoms don't get better in 2 days. Ask your provider about allergy testing if you have had a severe reaction, or have had several episodes of hives. He or she can use the allergy testing to find out what you are allergic to.  When to seek medical advice  Call your healthcare provider right away if any of these occur:    Fever of 100.4 F (38.0 C) or higher, or as directed by your healthcare provider    Redness, swelling, or pain    Foul-smelling fluid coming from the rash  Call 911  Call 911 if any of the following occur:    Swelling of the face, throat, or tongue    Trouble breathing or swallowing    Dizziness, weakness, or fainting  Date Last Reviewed: 9/1/2016 2000-2017 [employer]. [address] [place] [address], [place] [address]. All rights reserved. This information is not intended as a substitute for professional medical care. Always follow your healthcare professional's instructions.

## 2017-12-30 ENCOUNTER — HOSPITAL ENCOUNTER (EMERGENCY)
Facility: CLINIC | Age: 27
Discharge: HOME | End: 2017-12-30
Attending: FAMILY MEDICINE | Admitting: FAMILY MEDICINE
Payer: COMMERCIAL

## 2017-12-30 VITALS
OXYGEN SATURATION: 96 % | DIASTOLIC BLOOD PRESSURE: 78 MMHG | TEMPERATURE: 98.1 F | HEART RATE: 104 BPM | SYSTOLIC BLOOD PRESSURE: 117 MMHG

## 2017-12-30 DIAGNOSIS — T78.40XA ALLERGIC REACTION, INITIAL ENCOUNTER: ICD-10-CM

## 2017-12-30 PROCEDURE — 25000132 ZZH RX MED GY IP 250 OP 250 PS 637: Performed by: FAMILY MEDICINE

## 2017-12-30 PROCEDURE — 99283 EMERGENCY DEPT VISIT LOW MDM: CPT | Mod: Z6 | Performed by: FAMILY MEDICINE

## 2017-12-30 PROCEDURE — 25000125 ZZHC RX 250: Performed by: FAMILY MEDICINE

## 2017-12-30 PROCEDURE — 99283 EMERGENCY DEPT VISIT LOW MDM: CPT | Performed by: FAMILY MEDICINE

## 2017-12-30 RX ORDER — ALBUTEROL SULFATE 90 UG/1
2 AEROSOL, METERED RESPIRATORY (INHALATION) EVERY 6 HOURS PRN
Qty: 1 INHALER | Refills: 0 | Status: ON HOLD | OUTPATIENT
Start: 2017-12-30 | End: 2018-11-19

## 2017-12-30 RX ORDER — PREDNISONE 20 MG/1
40 TABLET ORAL ONCE
Status: COMPLETED | OUTPATIENT
Start: 2017-12-30 | End: 2017-12-30

## 2017-12-30 RX ORDER — DIPHENHYDRAMINE HCL 25 MG
50 CAPSULE ORAL ONCE
Status: COMPLETED | OUTPATIENT
Start: 2017-12-30 | End: 2017-12-30

## 2017-12-30 RX ORDER — PREDNISONE 20 MG/1
40 TABLET ORAL DAILY
Qty: 6 TABLET | Refills: 0 | Status: SHIPPED | OUTPATIENT
Start: 2017-12-30 | End: 2018-01-03

## 2017-12-30 RX ORDER — EPINEPHRINE 0.3 MG/.3ML
0.3 INJECTION SUBCUTANEOUS
Qty: 0.6 ML | Refills: 1 | Status: ON HOLD | OUTPATIENT
Start: 2017-12-30 | End: 2018-11-19

## 2017-12-30 RX ADMIN — DIPHENHYDRAMINE HYDROCHLORIDE 50 MG: 25 CAPSULE ORAL at 19:40

## 2017-12-30 RX ADMIN — PREDNISONE 40 MG: 20 TABLET ORAL at 19:40

## 2017-12-30 RX ADMIN — RANITIDINE 150 MG: 150 TABLET ORAL at 19:40

## 2017-12-30 ASSESSMENT — ENCOUNTER SYMPTOMS
HEADACHES: 0
FEVER: 0
CHILLS: 0
DIARRHEA: 0
COUGH: 0
SHORTNESS OF BREATH: 0
SINUS PRESSURE: 0
CONSTIPATION: 0
FREQUENCY: 0
DYSURIA: 0
VOMITING: 0
BLOOD IN STOOL: 0
PALPITATIONS: 0
WHEEZING: 1
DIAPHORESIS: 0
NAUSEA: 0
ABDOMINAL PAIN: 0
SORE THROAT: 1

## 2017-12-30 NOTE — ED NOTES
Patient states that she was here for allergic reaction on Friday morning was treated with antihistamine and steroid went home felt it was getting better thinking it was caused by new shampoo changed shampoo but forgot to switch towels so things she is reacting to towel, patient with red welts generalized over body. Patient denies difficulty breathing or SOB but states feels throat is scratchy and tongue is slightly swollen.

## 2017-12-30 NOTE — ED AVS SNAPSHOT
[facility] Emergency Department    [address] [facility] [address]    Phone:  [phone]    Fax:  [phone]                                       Jennifer Orlando   MRN: [id number]    Department:  [facility] Emergency Department   Date of Visit:  12/30/2017           Patient Information     Date Of Birth          1990        Your diagnoses for this visit were:     Allergic reaction, initial encounter unclear cause.  acoid all possible allergans including the shampoo.  follow-up clinic. take benadryl, zantac, prednisone. albuterol if needed. epi-pen if needed.,  return for worsening.       You were seen by Jr Walker MD.      Follow-up Information     Follow up with Philipp Pringle MD In 1 week.    Specialty:  Family Practice    Contact information:    [address] [facility] [address]  [phone]          Follow up with [facility] Emergency Department.    Specialty:  EMERGENCY MEDICINE    Why:  As needed, If symptoms worsen    Contact information:    [address] [place] [id number] [phone]    Additional information:    The medical center is located at   [address] [place] [address] (between [address] in [place], [place] miles north   of [place]).        Discharge Instructions         ICD-10-CM    1. Allergic reaction, initial encounter T78.40XA     unclear cause.  acoid all possible allergans including the shampoo.  follow-up clinic. take benadryl, zantac, prednisone. albuterol if needed. epi-pen if needed.,  return for worsening.         Medicine Reaction: Allergic  You are having an allergic reaction to a medicine you have taken. This causes an itchy rash and sometimes swelling of various parts of the body. It may also cause trouble swallowing or breathing. The rash may take a few hours or up to 2 weeks to go away. In the future, remember to tell your healthcare provider about your allergy to this medicine so that medicines of this type won't  be used again.  Any medicine can cause an allergic reaction. But the most allergic reactions are caused by:    Penicillin and related medicines    Aspirin    Ibuprofen    Seizure medicines  Vaccines may also trigger allergies. People whose parents or siblings have allergies are at a higher risk of developing a medicine allergy. Allergy testing may sometimes be needed to figure out the cause.  Symptoms may occur within minutes, hours, or even weeks after exposure to the medicine. It can be a mild or severe reaction, or potentially life threatening. Most of us think of allergic reactions when we have a rash or itchy skin. Symptoms can include:    Rash, hives, redness, welts, blisters    Itching, burning, stinging, pain    Dry, flaky, cracking, scaly skin    Belly (abdominal) cramps or nausea or stomach pain    Fever.  Sometimes fever is the only symptom of a drug reaction. In older adults, the risk of fever increases with the number of medicines the person takes.  More severe symptoms include:    Swelling of the face or lips, or drooling    Trouble swallowing, feeling like your throat is closing    Trouble breathing, wheezing    Hoarse voice or trouble speaking    Severe nausea or vomiting or diarrhea      Feeling faint or lightheaded, rapid heart rate  Home care    The goal of treatment is to help relieve the symptoms, and get you feeling better. Mild to medium medicine reactions usually respond quickly to antihistamines and steroids. The rash will usually fade over several days. But it can sometimes last a couple of weeks. Over the next couple of days, there may be times when it is gets a little worse, and then better again. Here are some things to do:    Throw the medicine away and don t take it again. The next reaction may be much worse.    Add this medicine reaction to your electronic medical record.    When getting a new medicine, always tell the healthcare provider that you are allergic to this medicine. Make  certain the provider writes it down in your medical record.    Avoid tight clothing and anything that heats up your skin (hot showers or baths, direct sunlight). Heat will make itching worse.    An ice pack will relieve local areas of intense itching and redness. To make an ice pack, put ice cubes in a plastic bag that seals at the top. Wrap the bag in a clean, thin towel or cloth. Don t put ice directly on the skin.    To help prevent an infection, don't scratch the affected area. Scratching may worsen the reaction. It can damage your skin and lead to an infection. Always check the affected site for signs of an infection.    Your provider may give you a prescription antihistamine.    If you are not given a prescription antihistamine, oral diphenhydramine is an over-the-counter antihistamine available at pharmacies and grocery stores. This may be used to reduce itching if large areas of the skin are involved. This antihistamine may make you sleepy, so be careful using it in the daytime or when going to school, working, or driving. Note: Don t use diphenhydramine if you have glaucoma or if you are a man with trouble urinating due to an enlarged prostate. There are other antihistamines that cause less drowsiness and are a good choice for daytime use. Ask your pharmacist for suggestions.    Don't use diphenhydramine cream on your skin. It can cause a further skin reaction for some people.    Contact your healthcare provider and ask what can be used on the affected area to help decrease the itching.  Follow-up care  Follow up with your healthcare provider, or as advised if your symptoms do not continue to improve or they get worse.  Call 911  Call 911 if any of these occur:    Shortness of breath    Cool, moist, pale skin    Swelling in the face, eyelids, mouth, tongue, or lips    Drooling    Trouble breathing or swallowing, wheezing    New or worsening swelling in the mouth, throat, or tongue    Hoarse voice or trouble  speaking     Fainting or loss of consciousness    Rapid heart rate    Feeling of dizziness or weakness or a sudden drop in blood pressure    Feeling of doom    Feeling lightheaded    Severe nausea, vomiting, or diarrhea  When to seek medical advice  Call your healthcare provider right away if any of these occur:    Continuing or recurring symptoms    Nausea, abdominal cramps or stomach pain    Spreading areas of itching, redness or swelling    Signs of infection:    Spreading redness    Increased pain or swelling    Fever of 100.4 F (38 C) or above lasting for 24 to 48 hours, or as directed by your provider    Fluid or colored drainage from the affected area  Date Last Reviewed: 3/1/2017    2495-2460 [employer]. [address] [place] [address]. All rights reserved. This information is not intended as a substitute for professional medical care. Always follow your healthcare professional's instructions.          24 Hour Appointment Hotline       To make an appointment at any [facility], call [phone] ([phone]). If you don't have a family doctor or clinic, we will help you find one. [facility] clinics are conveniently located to serve the needs of you and your family.             Review of your medicines      START taking        Dose / Directions Last dose taken    albuterol 108 (90 BASE) MCG/ACT Inhaler   Commonly known as:  PROAIR HFA/PROVENTIL HFA/VENTOLIN HFA   Dose:  2 puff   Quantity:  1 Inhaler        Inhale 2 puffs into the lungs every 6 hours as needed for shortness of breath / dyspnea (and before exercise)   Refills:  0        predniSONE 20 MG tablet   Commonly known as:  DELTASONE   Dose:  40 mg   Quantity:  6 tablet        Take 2 tablets (40 mg) by mouth daily for 3 days   Refills:  0        ranitidine 150 MG tablet   Commonly known as:  ZANTAC   Dose:  150 mg   Quantity:  6 tablet        Take 1 tablet (150 mg) by mouth 2 times daily   Refills:  0          Our  records show that you are taking the medicines listed below. If these are incorrect, please call your family doctor or clinic.        Dose / Directions Last dose taken    EPINEPHrine 0.3 MG/0.3ML injection 2-pack   Commonly known as:  EPIPEN/ADRENACLICK/or ANY BX GENERIC EQUIV   Dose:  0.3 mg   Quantity:  0.6 mL        Inject 0.3 mLs (0.3 mg) into the muscle once as needed for anaphylaxis   Refills:  1        FOLIC ACID PO   Dose:  800 mcg        Take 800 mcg by mouth daily   Refills:  0        misoprostol 200 MCG tablet   Commonly known as:  CYTOTEC   Dose:  200 mcg   Quantity:  4 tablet        Place 1 tablet (200 mcg) vaginally once as needed   Refills:  0        prenatal multivitamin plus iron 27-0.8 MG Tabs per tablet   Dose:  1 tablet        Take 1 tablet by mouth daily   Refills:  0                Prescriptions were sent or printed at these locations (4 Prescriptions)                   [facility] [address] [facility]   [address] [facility] [address]    Telephone:  [phone]   Fax:  [phone]   Hours:                  E-Prescribed (4 of 4)         predniSONE (DELTASONE) 20 MG tablet               albuterol (PROAIR HFA/PROVENTIL HFA/VENTOLIN HFA) 108 (90 BASE) MCG/ACT Inhaler               EPINEPHrine (EPIPEN/ADRENACLICK/OR ANY BX GENERIC EQUIV) 0.3 MG/0.3ML injection 2-pack               ranitidine (ZANTAC) 150 MG tablet                Procedures and tests performed during your visit     Peripheral IV catheter      Orders Needing Specimen Collection     None      Pending Results     No orders found from 12/28/2017 to 12/31/2017.            Pending Culture Results     No orders found from 12/28/2017 to 12/31/2017.            Pending Results Instructions     If you had any lab results that were not finalized at the time of your Discharge, you can call the ED Lab Result RN at [phone]. You will be contacted by this team for any positive Lab results or changes in  treatment. The nurses are available 7 days a week from 10A to 6:30P.  You can leave a message 24 hours per day and they will return your call.        Test Results From Your Hospital Stay               Thank you for choosing [facility]       Thank you for choosing [facility] for your care. Our goal is always to provide you with excellent care. Hearing back from our patients is one way we can continue to improve our services. Please take a few minutes to complete the written survey that you may receive in the mail after you visit with us. Thank you!        [employer] Information     [employer] gives you secure access to your electronic health record. If you see a primary care provider, you can also send messages to your care team and make appointments. If you have questions, please call your primary care clinic.  If you do not have a primary care provider, please call [phone] and they will assist you.        Care EveryWhere ID     This is your Care EveryWhere ID. This could be used by other organizations to access your [facility] medical records  [id number]        Equal Access to Services     GRISEL TOURE AH: Samy Vasques, wareinada scotty, qaybta kaalmapaty dominguez, loyda boucher. So [facility] [phone].    ATENCIÓN: Si habla español, tiene a lasren disposición servicios gratuitos de asistencia lingüística. Llame al [phone].    We comply with applicable federal civil rights laws and [place] laws. We do not discriminate on the basis of race, color, national origin, age, disability, sex, sexual orientation, or gender identity.            After Visit Summary       This is your record. Keep this with you and show to your community pharmacist(s) and doctor(s) at your next visit.

## 2017-12-30 NOTE — ED AVS SNAPSHOT
[facility] Emergency Department    [address] [facility] [address]    Phone:  [phone]    Fax:  [phone]                                       Jennifer Orlando   MRN: [id number]    Department:  [facility] Emergency Department   Date of Visit:  12/30/2017           After Visit Summary Signature Page     I have received my discharge instructions, and my questions have been answered. I have discussed any challenges I see with this plan with the nurse or doctor.    ..........................................................................................................................................  Patient/Patient Representative Signature      ..........................................................................................................................................  Patient Representative Print Name and Relationship to Patient    ..................................................               ................................................  Date                                            Time    ..........................................................................................................................................  Reviewed by Signature/Title    ...................................................              ..............................................  Date                                                            Time

## 2017-12-31 NOTE — ED PROVIDER NOTES
"  History     Chief Complaint   Patient presents with     Hives     hives all over arms, trunk, face and tongue, \"throat feels scratchy\"  no diff breathing     HPI  Jennifer Orlando is a 27 year old female who presents after being seen last night for angioedema - at that time had used a new shampoo and after 2 days developed hivess starting at the occipital scalp and then generalizing along with lip swelling.  received epineprhine, had already taken benadryl, given solumedrol, pepcid.  Imnproved afgter observation - discharged home with epi pen and returned for hives increasing today without lip swelling, but with sense of scratchy throat.  No dyspnea.      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 08/04/2016     Priority: Medium     Obesity 04/22/2013     Priority: Medium     CARDIOVASCULAR SCREENING; LDL GOAL LESS THAN 160 10/10/2012     Priority: Medium     Menstrual migraine 08/13/2012     Priority: Medium     Elevated blood pressure reading without diagnosis of hypertension 08/13/2012     Priority: Medium        Past Medical History:    Past Medical History:   Diagnosis Date     Chickenpox      Obese      Otitis media        Past Surgical History:    Past Surgical History:   Procedure Laterality Date     DILATION AND CURETTAGE SUCTION N/A 11/29/2017    Procedure: DILATION AND CURETTAGE SUCTION;  Dilation and Curettage Suction;  Surgeon: Triny Soni MD;  Location: [place] OR     [facility]      x 2       Family History:    Family History   Problem Relation Age of Onset     Hypertension Mother      DIABETES Mother      DIABETES Maternal Grandmother      Hypertension Maternal Grandmother      Hypertension Maternal Grandfather      Autoimmune Disease Maternal Grandfather      lupus     Hypertension Paternal Grandmother      HEART DISEASE Paternal Grandmother      stroke     Cancer - colorectal Paternal Grandfather      in his 70's     " GASTROINTESTINAL DISEASE Brother      stomach ulcer       Social History:  Marital Status:   [2]  Social History   Substance Use Topics     Smoking status: Never Smoker     Smokeless tobacco: Never Used     Alcohol use No      Comment: occas.- quit with pregnancy        Medications:      EPINEPHrine (EPIPEN/ADRENACLICK/OR ANY BX GENERIC EQUIV) 0.3 MG/0.3ML injection 2-pack   misoprostol (CYTOTEC) 200 MCG tablet   Prenatal Vit-Fe Fumarate-FA (PRENATAL MULTIVITAMIN PLUS IRON) 27-0.8 MG TABS per tablet   FOLIC ACID PO         Review of Systems   Constitutional: Negative for chills, diaphoresis and fever.   HENT: Positive for sore throat. Negative for ear pain and sinus pressure.    Eyes: Negative for visual disturbance.   Respiratory: Positive for wheezing (?today). Negative for cough and shortness of breath.    Cardiovascular: Negative for chest pain and palpitations.   Gastrointestinal: Negative for abdominal pain, blood in stool, constipation, diarrhea, nausea and vomiting.   Genitourinary: Negative for dysuria, frequency and urgency.   Skin: Positive for rash.   Neurological: Negative for headaches.   All other systems reviewed and are negative.      Physical Exam   BP: 134/81  Pulse: 104  Temp: 98.1  F (36.7  C)  SpO2: 99 %      Physical Exam   Constitutional: No distress.   HENT:   Mouth/Throat: Oropharynx is clear and moist.   Neck: Neck supple.   Cardiovascular: Normal rate.  Exam reveals no gallop and no friction rub.    No murmur heard.  Pulmonary/Chest: No stridor. No respiratory distress. She has no wheezes. She has no rales.   Abdominal: There is no tenderness.   Musculoskeletal: She exhibits no edema.   Neurological: She is alert.   Skin: Rash noted. Rash is urticarial. She is not diaphoretic.     no lip edema. no macroglossia. no resp distress. no neck swelling.     diffuse urticaria    ED Course     ED Course     Procedures               Critical Care time:  none               Labs Ordered and  Resulted from Time of ED Arrival Up to the Time of Departure from the ED - No data to display    Assessments & Plan (with Medical Decision Making)     MDM: Jennifer Orlando is a 27 year old female who presented with findings suggestive of allergic reaction that persists following her evaluation last night.  There are no findings of anaphylaxis at this time.  The airway is clear.  There is no macroglossia.  No stridor or wheezing.  No respiratory distress.  She is not yet on steroids and I did start these.  EpiPen was prescribed.  Both Benadryl and Zantac are recommended.  Precautions are given for return.    I have reviewed the nursing notes.    I have reviewed the findings, diagnosis, plan and need for follow up with the patient.       New Prescriptions    No medications on file       Final diagnoses:   Allergic reaction, initial encounter - unclear cause.  acoid all possible allergans including the shampoo.  follow-up clinic. take benadryl, zantac, prednisone. albuterol if needed. epi-pen if needed.,  return for worsening.       12/30/2017   [facility] EMERGENCY DEPARTMENT     Jr Walker MD  12/31/17 0914

## 2017-12-31 NOTE — DISCHARGE INSTRUCTIONS
ICD-10-CM    1. Allergic reaction, initial encounter T78.40XA     unclear cause.  acoid all possible allergans including the shampoo.  follow-up clinic. take benadryl, zantac, prednisone. albuterol if needed. epi-pen if needed.,  return for worsening.         Medicine Reaction: Allergic  You are having an allergic reaction to a medicine you have taken. This causes an itchy rash and sometimes swelling of various parts of the body. It may also cause trouble swallowing or breathing. The rash may take a few hours or up to 2 weeks to go away. In the future, remember to tell your healthcare provider about your allergy to this medicine so that medicines of this type won't be used again.  Any medicine can cause an allergic reaction. But the most allergic reactions are caused by:    Penicillin and related medicines    Aspirin    Ibuprofen    Seizure medicines  Vaccines may also trigger allergies. People whose parents or siblings have allergies are at a higher risk of developing a medicine allergy. Allergy testing may sometimes be needed to figure out the cause.  Symptoms may occur within minutes, hours, or even weeks after exposure to the medicine. It can be a mild or severe reaction, or potentially life threatening. Most of us think of allergic reactions when we have a rash or itchy skin. Symptoms can include:    Rash, hives, redness, welts, blisters    Itching, burning, stinging, pain    Dry, flaky, cracking, scaly skin    Belly (abdominal) cramps or nausea or stomach pain    Fever.  Sometimes fever is the only symptom of a drug reaction. In older adults, the risk of fever increases with the number of medicines the person takes.  More severe symptoms include:    Swelling of the face or lips, or drooling    Trouble swallowing, feeling like your throat is closing    Trouble breathing, wheezing    Hoarse voice or trouble speaking    Severe nausea or vomiting or diarrhea      Feeling faint or lightheaded, rapid heart  rate  Home care    The goal of treatment is to help relieve the symptoms, and get you feeling better. Mild to medium medicine reactions usually respond quickly to antihistamines and steroids. The rash will usually fade over several days. But it can sometimes last a couple of weeks. Over the next couple of days, there may be times when it is gets a little worse, and then better again. Here are some things to do:    Throw the medicine away and don t take it again. The next reaction may be much worse.    Add this medicine reaction to your electronic medical record.    When getting a new medicine, always tell the healthcare provider that you are allergic to this medicine. Make certain the provider writes it down in your medical record.    Avoid tight clothing and anything that heats up your skin (hot showers or baths, direct sunlight). Heat will make itching worse.    An ice pack will relieve local areas of intense itching and redness. To make an ice pack, put ice cubes in a plastic bag that seals at the top. Wrap the bag in a clean, thin towel or cloth. Don t put ice directly on the skin.    To help prevent an infection, don't scratch the affected area. Scratching may worsen the reaction. It can damage your skin and lead to an infection. Always check the affected site for signs of an infection.    Your provider may give you a prescription antihistamine.    If you are not given a prescription antihistamine, oral diphenhydramine is an over-the-counter antihistamine available at pharmacies and grocery stores. This may be used to reduce itching if large areas of the skin are involved. This antihistamine may make you sleepy, so be careful using it in the daytime or when going to school, working, or driving. Note: Don t use diphenhydramine if you have glaucoma or if you are a man with trouble urinating due to an enlarged prostate. There are other antihistamines that cause less drowsiness and are a good choice for daytime  use. Ask your pharmacist for suggestions.    Don't use diphenhydramine cream on your skin. It can cause a further skin reaction for some people.    Contact your healthcare provider and ask what can be used on the affected area to help decrease the itching.  Follow-up care  Follow up with your healthcare provider, or as advised if your symptoms do not continue to improve or they get worse.  Call 911  Call 911 if any of these occur:    Shortness of breath    Cool, moist, pale skin    Swelling in the face, eyelids, mouth, tongue, or lips    Drooling    Trouble breathing or swallowing, wheezing    New or worsening swelling in the mouth, throat, or tongue    Hoarse voice or trouble speaking     Fainting or loss of consciousness    Rapid heart rate    Feeling of dizziness or weakness or a sudden drop in blood pressure    Feeling of doom    Feeling lightheaded    Severe nausea, vomiting, or diarrhea  When to seek medical advice  Call your healthcare provider right away if any of these occur:    Continuing or recurring symptoms    Nausea, abdominal cramps or stomach pain    Spreading areas of itching, redness or swelling    Signs of infection:    Spreading redness    Increased pain or swelling    Fever of 100.4 F (38 C) or above lasting for 24 to 48 hours, or as directed by your provider    Fluid or colored drainage from the affected area  Date Last Reviewed: 3/1/2017    8569-1693 [employer]. [address] [place] [address], [place] [address]. All rights reserved. This information is not intended as a substitute for professional medical care. Always follow your healthcare professional's instructions.

## 2018-01-01 ENCOUNTER — HOSPITAL ENCOUNTER (EMERGENCY)
Facility: CLINIC | Age: 28
Discharge: HOME | End: 2018-01-01
Attending: STUDENT IN AN ORGANIZED HEALTH CARE EDUCATION/TRAINING PROGRAM | Admitting: STUDENT IN AN ORGANIZED HEALTH CARE EDUCATION/TRAINING PROGRAM
Payer: COMMERCIAL

## 2018-01-01 ENCOUNTER — NURSE TRIAGE (OUTPATIENT)
Dept: NURSING | Facility: CLINIC | Age: 28
End: 2018-01-01

## 2018-01-01 VITALS
WEIGHT: 225 LBS | BODY MASS INDEX: 36.16 KG/M2 | SYSTOLIC BLOOD PRESSURE: 130 MMHG | RESPIRATION RATE: 14 BRPM | HEIGHT: 66 IN | OXYGEN SATURATION: 98 % | DIASTOLIC BLOOD PRESSURE: 65 MMHG | TEMPERATURE: 98.1 F | HEART RATE: 92 BPM

## 2018-01-01 DIAGNOSIS — L51.9 ERYTHEMA MULTIFORME: ICD-10-CM

## 2018-01-01 DIAGNOSIS — T78.3XXA ANGIOEDEMA, INITIAL ENCOUNTER: ICD-10-CM

## 2018-01-01 LAB
ALBUMIN SERPL-MCNC: 3.5 G/DL (ref 3.4–5)
ALP SERPL-CCNC: 82 U/L (ref 40–150)
ALT SERPL W P-5'-P-CCNC: 20 U/L (ref 0–50)
ANION GAP SERPL CALCULATED.3IONS-SCNC: 7 MMOL/L (ref 3–14)
AST SERPL W P-5'-P-CCNC: 18 U/L (ref 0–45)
BASOPHILS # BLD AUTO: 0 10E9/L (ref 0–0.2)
BASOPHILS NFR BLD AUTO: 0.1 %
BILIRUB SERPL-MCNC: 1.4 MG/DL (ref 0.2–1.3)
BUN SERPL-MCNC: 18 MG/DL (ref 7–30)
CALCIUM SERPL-MCNC: 8.6 MG/DL (ref 8.5–10.1)
CHLORIDE SERPL-SCNC: 106 MMOL/L (ref 94–109)
CO2 SERPL-SCNC: 26 MMOL/L (ref 20–32)
CREAT SERPL-MCNC: 0.66 MG/DL (ref 0.52–1.04)
DIFFERENTIAL METHOD BLD: ABNORMAL
EOSINOPHIL # BLD AUTO: 0.1 10E9/L (ref 0–0.7)
EOSINOPHIL NFR BLD AUTO: 0.4 %
ERYTHROCYTE [DISTWIDTH] IN BLOOD BY AUTOMATED COUNT: 13.5 % (ref 10–15)
GFR SERPL CREATININE-BSD FRML MDRD: >90 ML/MIN/1.7M2
GLUCOSE SERPL-MCNC: 91 MG/DL (ref 70–99)
HCT VFR BLD AUTO: 38.3 % (ref 35–47)
HGB BLD-MCNC: 12.6 G/DL (ref 11.7–15.7)
IMM GRANULOCYTES # BLD: 0.1 10E9/L (ref 0–0.4)
IMM GRANULOCYTES NFR BLD: 0.4 %
LYMPHOCYTES # BLD AUTO: 3.9 10E9/L (ref 0.8–5.3)
LYMPHOCYTES NFR BLD AUTO: 28.9 %
MCH RBC QN AUTO: 27.2 PG (ref 26.5–33)
MCHC RBC AUTO-ENTMCNC: 32.9 G/DL (ref 31.5–36.5)
MCV RBC AUTO: 83 FL (ref 78–100)
MONOCYTES # BLD AUTO: 0.8 10E9/L (ref 0–1.3)
MONOCYTES NFR BLD AUTO: 5.6 %
NEUTROPHILS # BLD AUTO: 8.8 10E9/L (ref 1.6–8.3)
NEUTROPHILS NFR BLD AUTO: 64.6 %
PLATELET # BLD AUTO: 258 10E9/L (ref 150–450)
POTASSIUM SERPL-SCNC: 3.8 MMOL/L (ref 3.4–5.3)
PROT SERPL-MCNC: 7.5 G/DL (ref 6.8–8.8)
RBC # BLD AUTO: 4.64 10E12/L (ref 3.8–5.2)
SODIUM SERPL-SCNC: 139 MMOL/L (ref 133–144)
WBC # BLD AUTO: 13.6 10E9/L (ref 4–11)

## 2018-01-01 PROCEDURE — 96374 THER/PROPH/DIAG INJ IV PUSH: CPT | Performed by: STUDENT IN AN ORGANIZED HEALTH CARE EDUCATION/TRAINING PROGRAM

## 2018-01-01 PROCEDURE — 25000128 H RX IP 250 OP 636: Performed by: STUDENT IN AN ORGANIZED HEALTH CARE EDUCATION/TRAINING PROGRAM

## 2018-01-01 PROCEDURE — 80053 COMPREHEN METABOLIC PANEL: CPT | Performed by: STUDENT IN AN ORGANIZED HEALTH CARE EDUCATION/TRAINING PROGRAM

## 2018-01-01 PROCEDURE — 25000132 ZZH RX MED GY IP 250 OP 250 PS 637: Performed by: STUDENT IN AN ORGANIZED HEALTH CARE EDUCATION/TRAINING PROGRAM

## 2018-01-01 PROCEDURE — 96372 THER/PROPH/DIAG INJ SC/IM: CPT | Performed by: STUDENT IN AN ORGANIZED HEALTH CARE EDUCATION/TRAINING PROGRAM

## 2018-01-01 PROCEDURE — 99284 EMERGENCY DEPT VISIT MOD MDM: CPT | Mod: 25 | Performed by: STUDENT IN AN ORGANIZED HEALTH CARE EDUCATION/TRAINING PROGRAM

## 2018-01-01 PROCEDURE — 99284 EMERGENCY DEPT VISIT MOD MDM: CPT | Mod: Z6 | Performed by: STUDENT IN AN ORGANIZED HEALTH CARE EDUCATION/TRAINING PROGRAM

## 2018-01-01 PROCEDURE — 85025 COMPLETE CBC W/AUTO DIFF WBC: CPT | Performed by: STUDENT IN AN ORGANIZED HEALTH CARE EDUCATION/TRAINING PROGRAM

## 2018-01-01 PROCEDURE — 96361 HYDRATE IV INFUSION ADD-ON: CPT | Performed by: STUDENT IN AN ORGANIZED HEALTH CARE EDUCATION/TRAINING PROGRAM

## 2018-01-01 PROCEDURE — 25000125 ZZHC RX 250: Performed by: STUDENT IN AN ORGANIZED HEALTH CARE EDUCATION/TRAINING PROGRAM

## 2018-01-01 RX ORDER — LIDOCAINE 40 MG/G
CREAM TOPICAL
Status: DISCONTINUED | OUTPATIENT
Start: 2018-01-01 | End: 2018-01-01 | Stop reason: HOSPADM

## 2018-01-01 RX ORDER — DIPHENHYDRAMINE HYDROCHLORIDE 50 MG/ML
50 INJECTION INTRAMUSCULAR; INTRAVENOUS ONCE
Status: COMPLETED | OUTPATIENT
Start: 2018-01-01 | End: 2018-01-01

## 2018-01-01 RX ORDER — PSEUDOEPHEDRINE HCL 120 MG/1
120 TABLET, FILM COATED, EXTENDED RELEASE ORAL ONCE
Status: COMPLETED | OUTPATIENT
Start: 2018-01-01 | End: 2018-01-01

## 2018-01-01 RX ORDER — LORATADINE 10 MG/1
10 TABLET ORAL ONCE
Status: COMPLETED | OUTPATIENT
Start: 2018-01-01 | End: 2018-01-01

## 2018-01-01 RX ADMIN — SODIUM CHLORIDE 1000 ML: 9 INJECTION, SOLUTION INTRAVENOUS at 02:40

## 2018-01-01 RX ADMIN — EPINEPHRINE 0.3 MG: 1 INJECTION, SOLUTION, CONCENTRATE INTRAVENOUS at 02:35

## 2018-01-01 RX ADMIN — DIPHENHYDRAMINE HYDROCHLORIDE 50 MG: 50 INJECTION, SOLUTION INTRAMUSCULAR; INTRAVENOUS at 02:41

## 2018-01-01 RX ADMIN — LORATADINE 10 MG: 10 TABLET ORAL at 02:33

## 2018-01-01 RX ADMIN — PSEUDOEPHEDRINE HYDROCHLORIDE 120 MG: 120 TABLET, FILM COATED, EXTENDED RELEASE ORAL at 02:33

## 2018-01-01 NOTE — ED PROVIDER NOTES
History     Chief Complaint   Patient presents with     Hives     Pt notes has been here 3 times this weekend for this allergic rxn - c/o hives to chest, lips, eyes and feet     HPI  Jennifer Orlando is a 27 year old female who represents to the department for evaluation of increasing skin lesions.  Records confirm that the patient was seen the previous 2 days in the department for evaluation of erythematous skin lesions she described as pruritic and had been diagnosed with urticaria.  I have personally seen her 2 evenings ago with what appeared to be an urticarial-like rash and lower lip angioedema which improved with a single dose of IM epinephrine and antihistamine therapy.  It appears as though the provider she saw yesterday prescribed oral prednisone for which she continues to take but this evening the rash seemed to spread and she still has lower lip swelling.  The angioedema is only mild but now affecting the right portion of her lower lip.  She continues to deny fever/chills, sore throat, intraoral swelling, difficulty breathing, chest pain, nausea/vomiting, diarrhea, or other concerning symptoms.  Of note, patient had a D&C performed>1 month ago but reports that it was uncomplicated and has no pelvic pain, vaginal bleeding, foul smelling discharge, or concern for infection.      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     Priority: Medium     Obesity 04/22/2013     Priority: Medium     CARDIOVASCULAR SCREENING; LDL GOAL LESS THAN 160 10/10/2012     Priority: Medium     Menstrual migraine 08/13/2012     Priority: Medium     Elevated blood pressure reading without diagnosis of hypertension 08/13/2012     Priority: Medium        Past Medical History:    Past Medical History:   Diagnosis Date     Chickenpox      Obese      Otitis media        Past Surgical History:    Past Surgical History:   Procedure Laterality Date      DILATION AND CURETTAGE SUCTION N/A 11/29/2017    Procedure: DILATION AND CURETTAGE SUCTION;  Dilation and Curettage Suction;  Surgeon: Triny Soni MD;  Location: [facility] OR     PE TUBES      x 2       Family History:    Family History   Problem Relation Age of Onset     Hypertension Mother      DIABETES Mother      DIABETES Maternal Grandmother      Hypertension Maternal Grandmother      Hypertension Maternal Grandfather      Autoimmune Disease Maternal Grandfather      lupus     Hypertension Paternal Grandmother      HEART DISEASE Paternal Grandmother      stroke     Cancer - colorectal Paternal Grandfather      in his 70's     GASTROINTESTINAL DISEASE Brother      stomach ulcer       Social History:  Marital Status:   [2]  Social History   Substance Use Topics     Smoking status: Never Smoker     Smokeless tobacco: Never Used     Alcohol use No      Comment: occas.- quit with pregnancy        Medications:      predniSONE (DELTASONE) 20 MG tablet   albuterol (PROAIR HFA/PROVENTIL HFA/VENTOLIN HFA) 108 (90 BASE) MCG/ACT Inhaler   EPINEPHrine (EPIPEN/ADRENACLICK/OR ANY BX GENERIC EQUIV) 0.3 MG/0.3ML injection 2-pack   ranitidine (ZANTAC) 150 MG tablet   misoprostol (CYTOTEC) 200 MCG tablet   Prenatal Vit-Fe Fumarate-FA (PRENATAL MULTIVITAMIN PLUS IRON) 27-0.8 MG TABS per tablet   FOLIC ACID PO         Review of Systems  Constitutional: Negative for fever or chills.  HENT: Negative oral or throat pain.  Respiratory: Negative for cough or shortness of breath.  Cardiovascular: Negative for chest pain.  Gastrointestinal: Negative for abdominal pain, nausea, vomiting, or diarrhea.   Genitourinary: Negative for dysuria, hematuria, pelvic pain, vaginal bleeding or discharge.  Musculoskeletal: Negative for neck pain, back pain, recent injury.  Neurological: Negative for headache or dizziness.  Skin: Positive for erythematous rash.    All others reviewed and are negative.      Physical Exam   BP: (!) 128/98  Pulse:  "92  Heart Rate: 82  Temp: 98.1  F (36.7  C)  Resp: 16  Height: 167.6 cm (5' 6\")  Weight: 102.1 kg (225 lb)  SpO2: 98 %      Physical Exam  Constitutional: Well developed, well nourished. Appears nontoxic and in no acute distress.   Head: Very subtle angioedema of right lower lip.  Eyes: Conjunctivae are normal.  Oral: Patient is without trismus. Moist oral mucosa. Normal dentition for age. Negative pharyngeal erythema. No exudate or soft palate petechiae. No uvular asymmetry. Without sublingual or submental swelling. Voice is not muffled. Tolerates secretions.  Neck: Neck supple.  Cardiovascular: No cyanosis. RRR. No audible murmurs noted.   Respiratory: Effort normal, no respiratory distress. CTAB without diminished regions. No wheezing, rhonchi, or crackles.  Gastrointestinal: Soft, nondistended abdomen. Nontender and without guarding. No rigidity or rebound tenderness. Negative McBurney's point. Negative for Rose's sign.   Musculoskeletal: Moves all extremities spontaneously and without complaint.  Neuro: Patient is alert.  Skin: Skin is warm and dry, not diaphoretic. Circular erythematous rash with central lesion which appears consistent with erythema multiforme.      ED Course     ED Course     Procedures               Critical Care time:  none               Results for orders placed or performed during the hospital encounter of 01/01/18 (from the past 24 hour(s))   CBC with platelets, differential   Result Value Ref Range    WBC 13.6 (H) 4.0 - 11.0 10e9/L    RBC Count 4.64 3.8 - 5.2 10e12/L    Hemoglobin 12.6 11.7 - 15.7 g/dL    Hematocrit 38.3 35.0 - 47.0 %    MCV 83 78 - 100 fl    MCH 27.2 26.5 - 33.0 pg    MCHC 32.9 31.5 - 36.5 g/dL    RDW 13.5 10.0 - 15.0 %    Platelet Count 258 150 - 450 10e9/L    Diff Method Automated Method     % Neutrophils 64.6 %    % Lymphocytes 28.9 %    % Monocytes 5.6 %    % Eosinophils 0.4 %    % Basophils 0.1 %    % Immature Granulocytes 0.4 %    Absolute Neutrophil 8.8 (H) " 1.6 - 8.3 10e9/L    Absolute Lymphocytes 3.9 0.8 - 5.3 10e9/L    Absolute Monocytes 0.8 0.0 - 1.3 10e9/L    Absolute Eosinophils 0.1 0.0 - 0.7 10e9/L    Absolute Basophils 0.0 0.0 - 0.2 10e9/L    Abs Immature Granulocytes 0.1 0 - 0.4 10e9/L   Comprehensive metabolic panel   Result Value Ref Range    Sodium 139 133 - 144 mmol/L    Potassium 3.8 3.4 - 5.3 mmol/L    Chloride 106 94 - 109 mmol/L    Carbon Dioxide 26 20 - 32 mmol/L    Anion Gap 7 3 - 14 mmol/L    Glucose 91 70 - 99 mg/dL    Urea Nitrogen 18 7 - 30 mg/dL    Creatinine 0.66 0.52 - 1.04 mg/dL    GFR Estimate >90 >60 mL/min/1.7m2    GFR Estimate If Black >90 >60 mL/min/1.7m2    Calcium 8.6 8.5 - 10.1 mg/dL    Bilirubin Total 1.4 (H) 0.2 - 1.3 mg/dL    Albumin 3.5 3.4 - 5.0 g/dL    Protein Total 7.5 6.8 - 8.8 g/dL    Alkaline Phosphatase 82 40 - 150 U/L    ALT 20 0 - 50 U/L    AST 18 0 - 45 U/L         Assessments & Plan (with Medical Decision Making)   Jennifer Orlando is a 27 year old female who presents to the department for evaluation of increasing rash of torso and extremities.  She admits that it has been pruritic but now only mildly and of her upper extremities.  The rash along the back of her neck and back appear circular and with central dots typical of erythema multiforme, although the volar aspect of her forearms looks more consistent with urticaria.  She was given IV fluids with antihistamines and a single IM dose of epinephrine and monitored for several hours in the department with improvement in her symptoms.  She is not aware of any new environmental exposures and denies new foods or medications, has not been on antibiotics in greater than 1 month.  Etiology of her erythema multiforme remains unknown but could be viral in etiology.  Consulted [facility] on-call dermatologist Dr. Ramsay who admits it is difficult without being able to directly see the patient but angioedema is not a common finding for Bray-Bhavik syndrome and she is without  intraoral lesions on my examination.  At this time I recommend she continue with her prescription for prednisone as well as previously recommended OTC antihistamine agents but scheduled follow-up with allergy clinic.  May also consider following up with dermatology clinic if symptoms do not greatly improve over the next 5 days.    Prior to discharge, I made it clear that illness can unexpectedly develop/progress so she has been instructed to return to the emergency department for reevaluation of evolving symptoms, change in severity, intraoral lesions, respiratory symptoms, or other concerns. She seems comfortable with the discharge plan we discussed including follow up.    Disclaimer: This note consists of symbols derived from keyboarding, dictation, and/or voice recognition software. As a result, there may be errors in the script that have gone undetected.  Please consider this when interpreting information found in the chart.        I have reviewed the nursing notes.    I have reviewed the findings, diagnosis, plan and need for follow up with the patient.       New Prescriptions    No medications on file       Final diagnoses:   Erythema multiforme   Angioedema, initial encounter       1/1/2018   [facility] EMERGENCY DEPARTMENT     Bud Watts DO  01/01/18 0714

## 2018-01-01 NOTE — TELEPHONE ENCOUNTER
"  Reason for Disposition    SEVERE swelling of the entire face    Additional Information    Negative: [1] Life-threatening reaction (anaphylaxis) in the past to similar substance (e.g., food, insect bite/sting, chemical, etc.) AND [2] < 2 hours since exposure    Negative: Unresponsive, passed out or very weak    Negative: Swollen tongue    Negative: Difficulty breathing or wheezing    Negative: Sounds like a life-threatening emergency to the triager    Negative: Followed a face injury    Negative: [1] Bee sting AND [2] within last 24 hours    Negative: Insect bite suspected    Negative: Swelling mainly of lip(s)    Negative: Swelling mainly around the eyes    Negative: [1] SEVERE swelling of entire face AND [2] < 2 hours since exposure to high-risk allergen (e.g., peanuts, tree nuts, fish, shellfish or 1st dose of drug) AND [3] no serious symptoms AND [4] no serious allergic reaction in the past    Negative: Fever    Negative: Taking an ACE Inhibitor medication  (e.g., benazepril/LOTENSIN, captopril/CAPOTEN, enalapril/VASOTEC, lisinopril/ZESTRIL)    Negative: Patient sounds very sick or weak to the triager    Negative: Pregnant > 20 weeks    Negative: Postpartum (i.e. < 1 month since delivery)    Answer Assessment - Initial Assessment Questions  1. ONSET: \"When did the swelling start?\" (e.g., minutes, hours, days)      Thursday night, getting bad again  2. LOCATION: \"What part of the face is swollen?\"      Lips, throat, tongue  3. SEVERITY: \"How swollen is it?\"      increasing  4. ITCHING: \"Is there any itching?\" If so, ask: \"How much?\"   (Scale 1-10; mild, moderate or severe)      yes  5. PAIN: \"Is the swelling painful to touch?\" If so, ask: \"How painful is it?\"   (Scale 1-10; mild, moderate or severe)      no  6. FEVER: \"Do you have a fever?\" If so, ask: \"What is it, how was it measured, and when did it start?\"       no  7. CAUSE: \"What do you think is causing the face swelling?\"      Not sure what she is reacting " "to  8. RECURRENT SYMPTOM: \"Have you had face swelling before?\" If so, ask: \"When was the last time?\" \"What happened that time?\"      Since Thursday  9. OTHER SYMPTOMS: \"Do you have any other symptoms?\" (e.g., toothache, leg swelling)      no  10. PREGNANCY: \"Is there any chance you are pregnant?\" \"When was your last menstrual period?\"        no    Protocols used: FACE SWELLING-ADULT-AH    "

## 2018-01-01 NOTE — ED AVS SNAPSHOT
[facility] Emergency Department    [address] [address]    Phone:  [phone]    Fax:  [phone]                                       Jennifer Orlando   MRN: [id number]    Department:  [facility] Emergency Department   Date of Visit:  1/1/2018           After Visit Summary Signature Page     I have received my discharge instructions, and my questions have been answered. I have discussed any challenges I see with this plan with the nurse or doctor.    ..........................................................................................................................................  Patient/Patient Representative Signature      ..........................................................................................................................................  Patient Representative Print Name and Relationship to Patient    ..................................................               ................................................  Date                                            Time    ..........................................................................................................................................  Reviewed by Signature/Title    ...................................................              ..............................................  Date                                                            Time

## 2018-01-01 NOTE — DISCHARGE INSTRUCTIONS
Erythema Multiforme  Erythema multiforme is a skin rash. It s cause by a hypersensitivity reaction. Many things can cause the reaction. These include a virus, bacteria, fungus, medicine, vaccine, or food. Illnesses such as pneumonia and herpes can also cause it.  At first, the skin may have round red bumps, fluid-filled blisters, or pimples. Most of these turn into a round [place] with a small dark center. A white ring may surround the entire area of the skin. The sores may cause pain, burning, or itching. They occur most often on the forearms, legs, and back of hands and feet. They can spread to the abdomen, back, face, genitals, and mouth in severe cases. They can t be passed from person to person. You may also have a fever and muscle aches.  Treatment includes finding and removing the cause. If a medicine may be the cause, you will be told to stop taking it. The sores will likely go away in 2 to 3 weeks without treatment. It may take longer in severe cases. The sores may come back. Your doctor may prescribe medicine to reduce pain and inflammation, or an antiviral medicine. If any sores become infected, your doctor may prescribe an antibiotic. This condition is not contagious.  Home care  Follow these tips:    Apply over-the-counter hydrocortisone cream to the rash.    Soak in a bath with colloidal oatmeal added to the water. This can help relieve itching and pain.    If the rash was caused by a medicine, make sure to tell future healthcare providers that you are allergic to it.    You can take acetaminophen for fever and to ease pain.  Follow-up care  Follow up with your healthcare provider, or as advised.  When to seek medical advice  Call your healthcare provider right away if any of these occur:     Inability to eat or swallow because you have mouth pain    Your eyes become involved with the rash    Trouble breathing    Weakness, dizziness, or fainting    Swelling or tightness of the throat and trouble  swallowing    Fever of 100.4 F (38.0 C) or higher    The rash comes back after it goes away  Date Last Reviewed: 9/1/2016 2000-2017 [employer]. [address] [place] [address], [place] [address]. All rights reserved. This information is not intended as a substitute for professional medical care. Always follow your healthcare professional's instructions.

## 2018-01-01 NOTE — ED AVS SNAPSHOT
[facility] Emergency Department    [address] [facility] [address]    Phone:  [phone]    Fax:  [phone]                                       Jennifer Orlando   MRN: [id number]    Department:  [facility] Emergency Department   Date of Visit:  1/1/2018           Patient Information     Date Of Birth          1990        Your diagnoses for this visit were:     Erythema multiforme     Angioedema, initial encounter        You were seen by Bud Watts DO.      Follow-up Information     Follow up with [facility]. Schedule an appointment as soon as possible for a visit in 1 week.    Specialty:  Allergy    Why:  Followup for reevaluation and managment plan.    Contact information:    [address] [place] [phone]    Additional information:    The medical center is located at   [address] [place] [address] (between [address]   [facility] [address] [place] [address], four miles north   of [place]).        Follow up with [facility]. Call in 1 week.    Specialty:  Dermatology    Contact information:    [address] [place] [id number] [phone]    Additional information:    The medical center is located at   [address] [place] [address] (between [facility] and   [facility] [address] [place] [address], four miles north   of [place]).        Discharge Instructions         Erythema Multiforme  Erythema multiforme is a skin rash. It s cause by a hypersensitivity reaction. Many things can cause the reaction. These include a virus, bacteria, fungus, medicine, vaccine, or food. Illnesses such as pneumonia and herpes can also cause it.  At first, the skin may have round red bumps, fluid-filled blisters, or pimples. Most of these turn into a round [place] with a small dark center. A white ring may surround the entire area of the skin. The sores may cause pain, burning, or itching. They occur most often on the forearms, legs, and back of hands and feet. They can  spread to the abdomen, back, face, genitals, and mouth in severe cases. They can t be passed from person to person. You may also have a fever and muscle aches.  Treatment includes finding and removing the cause. If a medicine may be the cause, you will be told to stop taking it. The sores will likely go away in 2 to 3 weeks without treatment. It may take longer in severe cases. The sores may come back. Your doctor may prescribe medicine to reduce pain and inflammation, or an antiviral medicine. If any sores become infected, your doctor may prescribe an antibiotic. This condition is not contagious.  Home care  Follow these tips:    Apply over-the-counter hydrocortisone cream to the rash.    Soak in a bath with colloidal oatmeal added to the water. This can help relieve itching and pain.    If the rash was caused by a medicine, make sure to tell future healthcare providers that you are allergic to it.    You can take acetaminophen for fever and to ease pain.  Follow-up care  Follow up with your healthcare provider, or as advised.  When to seek medical advice  Call your healthcare provider right away if any of these occur:     Inability to eat or swallow because you have mouth pain    Your eyes become involved with the rash    Trouble breathing    Weakness, dizziness, or fainting    Swelling or tightness of the throat and trouble swallowing    Fever of 100.4 F (38.0 C) or higher    The rash comes back after it goes away  Date Last Reviewed: 9/1/2016 2000-2017 [employer]. [address] [place] [address]. All rights reserved. This information is not intended as a substitute for professional medical care. Always follow your healthcare professional's instructions.          Future Appointments        Provider Department Dept Phone Center    1/2/2018 2:40 PM Philipp Pringle MD [facility] [phone] [facility]      24 Hour Appointment Hotline       To make an appointment at any  [facility], call [phone] ([phone]). If you don't have a family doctor or clinic, we will help you find one. [facility] are conveniently located to serve the needs of you and your family.          ED Discharge Orders     ALLERGY/ASTHMA ADULT REFERRAL       Your provider has referred you to: [facility]: [facility] [phone] [email or web address]    Please be aware that coverage of these services is subject to the terms and limitations of your health insurance plan.  Call member services at your health plan with any benefit or coverage questions.      Please bring the following with you to your appointment:    (1) Any X-Rays, CTs or MRIs which have been performed.  Contact the facility where they were done to arrange for  prior to your scheduled appointment.    (2) List of current medications  (3) This referral request   (4) Any documents/labs given to you for this referral            DERMATOLOGY REFERRAL       Your provider has referred you to: [facility]: [facility] [phone]   [email or web address]    Please be aware that coverage of these services is subject to the terms and limitations of your health insurance plan.  Call member services at your health plan with any benefit or coverage questions.      Please bring the following with you to your appointment:    (1) Any X-Rays, CTs or MRIs which have been performed.  Contact the facility where they were done to arrange for  prior to your scheduled appointment.    (2) List of current medications  (3) This referral request   (4) Any documents/labs given to you for this referral                     Review of your medicines      Our records show that you are taking the medicines listed below. If these are incorrect, please call your family doctor or clinic.        Dose / Directions Last dose taken    albuterol 108 (90 BASE) MCG/ACT Inhaler    Commonly known as:  PROAIR HFA/PROVENTIL HFA/VENTOLIN HFA   Dose:  2 puff   Quantity:  1 Inhaler        Inhale 2 puffs into the lungs every 6 hours as needed for shortness of breath / dyspnea (and before exercise)   Refills:  0        EPINEPHrine 0.3 MG/0.3ML injection 2-pack   Commonly known as:  EPIPEN/ADRENACLICK/or ANY BX GENERIC EQUIV   Dose:  0.3 mg   Quantity:  0.6 mL        Inject 0.3 mLs (0.3 mg) into the muscle once as needed for anaphylaxis   Refills:  1        FOLIC ACID PO   Dose:  800 mcg        Take 800 mcg by mouth daily   Refills:  0        misoprostol 200 MCG tablet   Commonly known as:  CYTOTEC   Dose:  200 mcg   Quantity:  4 tablet        Place 1 tablet (200 mcg) vaginally once as needed   Refills:  0        predniSONE 20 MG tablet   Commonly known as:  DELTASONE   Dose:  40 mg   Quantity:  6 tablet        Take 2 tablets (40 mg) by mouth daily for 3 days   Refills:  0        prenatal multivitamin plus iron 27-0.8 MG Tabs per tablet   Dose:  1 tablet        Take 1 tablet by mouth daily   Refills:  0        ranitidine 150 MG tablet   Commonly known as:  ZANTAC   Dose:  150 mg   Quantity:  6 tablet        Take 1 tablet (150 mg) by mouth 2 times daily   Refills:  0                Procedures and tests performed during your visit     CBC with platelets, differential    Comprehensive metabolic panel    Peripheral IV: Standard    Pulse oximetry nursing    Vital signs      Orders Needing Specimen Collection     None      Pending Results     No orders found from 12/30/2017 to 1/2/2018.            Pending Culture Results     No orders found from 12/30/2017 to 1/2/2018.            Pending Results Instructions     If you had any lab results that were not finalized at the time of your Discharge, you can call the ED Lab Result RN at [phone]. You will be contacted by this team for any positive Lab results or changes in treatment. The nurses are available 7 days a week from 10A to 6:30P.  You can leave a  message 24 hours per day and they will return your call.        Test Results From Your Hospital Stay        1/1/2018  2:52 AM      Component Results     Component Value Ref Range & Units Status    WBC 13.6 (H) 4.0 - 11.0 10e9/L Final    RBC Count 4.64 3.8 - 5.2 10e12/L Final    Hemoglobin 12.6 11.7 - 15.7 g/dL Final    Hematocrit 38.3 35.0 - 47.0 % Final    MCV 83 78 - 100 fl Final    MCH 27.2 26.5 - 33.0 pg Final    MCHC 32.9 31.5 - 36.5 g/dL Final    RDW 13.5 10.0 - 15.0 % Final    Platelet Count 258 150 - 450 10e9/L Final    Diff Method Automated Method  Final    % Neutrophils 64.6 % Final    % Lymphocytes 28.9 % Final    % Monocytes 5.6 % Final    % Eosinophils 0.4 % Final    % Basophils 0.1 % Final    % Immature Granulocytes 0.4 % Final    Absolute Neutrophil 8.8 (H) 1.6 - 8.3 10e9/L Final    Absolute Lymphocytes 3.9 0.8 - 5.3 10e9/L Final    Absolute Monocytes 0.8 0.0 - 1.3 10e9/L Final    Absolute Eosinophils 0.1 0.0 - 0.7 10e9/L Final    Absolute Basophils 0.0 0.0 - 0.2 10e9/L Final    Abs Immature Granulocytes 0.1 0 - 0.4 10e9/L Final         1/1/2018  3:03 AM      Component Results     Component Value Ref Range & Units Status    Sodium 139 133 - 144 mmol/L Final    Potassium 3.8 3.4 - 5.3 mmol/L Final    Chloride 106 94 - 109 mmol/L Final    Carbon Dioxide 26 20 - 32 mmol/L Final    Anion Gap 7 3 - 14 mmol/L Final    Glucose 91 70 - 99 mg/dL Final    Urea Nitrogen 18 7 - 30 mg/dL Final    Creatinine 0.66 0.52 - 1.04 mg/dL Final    GFR Estimate >90 >60 mL/min/1.7m2 Final    Non  GFR Calc    GFR Estimate If Black >90 >60 mL/min/1.7m2 Final    African American GFR Calc    Calcium 8.6 8.5 - 10.1 mg/dL Final    Bilirubin Total 1.4 (H) 0.2 - 1.3 mg/dL Final    Albumin 3.5 3.4 - 5.0 g/dL Final    Protein Total 7.5 6.8 - 8.8 g/dL Final    Alkaline Phosphatase 82 40 - 150 U/L Final    ALT 20 0 - 50 U/L Final    AST 18 0 - 45 U/L Final                Thank you for choosing [facility]       Thank you  for choosing [facility] for your care. Our goal is always to provide you with excellent care. Hearing back from our patients is one way we can continue to improve our services. Please take a few minutes to complete the written survey that you may receive in the mail after you visit with us. Thank you!        [employer] Information     [employer] gives you secure access to your electronic health record. If you see a primary care provider, you can also send messages to your care team and make appointments. If you have questions, please call your primary care clinic.  If you do not have a primary care provider, please call [phone] and they will assist you.        Care EveryWhere ID     This is your Care EveryWhere ID. This could be used by other organizations to access your [facility] medical records  [id number]        Equal Access to Services     GRISEL TOURE : Samy Vasques, rocio fajardo, godwin dominguez, loyda boucher. So [facility] [phone].    ATENCIÓN: Si habla español, tiene a larsen disposición servicios gratuitos de asistencia lingüística. Llame al [phone].    We comply with applicable federal civil rights laws and [place] laws. We do not discriminate on the basis of race, color, national origin, age, disability, sex, sexual orientation, or gender identity.            After Visit Summary       This is your record. Keep this with you and show to your community pharmacist(s) and doctor(s) at your next visit.

## 2018-01-01 NOTE — ED NOTES
Patient resting comfortably on cot. Patient hives decreasing in redness and swelling. States her bottom lip feel like it is less swollen and patient is less red in face.

## 2018-01-02 ENCOUNTER — OFFICE VISIT (OUTPATIENT)
Dept: FAMILY MEDICINE | Facility: CLINIC | Age: 28
End: 2018-01-02
Payer: COMMERCIAL

## 2018-01-02 VITALS
HEIGHT: 66 IN | HEART RATE: 78 BPM | BODY MASS INDEX: 35.2 KG/M2 | TEMPERATURE: 97.3 F | DIASTOLIC BLOOD PRESSURE: 87 MMHG | WEIGHT: 219 LBS | SYSTOLIC BLOOD PRESSURE: 130 MMHG

## 2018-01-02 DIAGNOSIS — L50.9 URTICARIA: Primary | ICD-10-CM

## 2018-01-02 PROCEDURE — 99213 OFFICE O/P EST LOW 20 MIN: CPT | Performed by: FAMILY MEDICINE

## 2018-01-02 NOTE — NURSING NOTE
"Chief Complaint   Patient presents with     ER F/U       Initial /87  Pulse 78  Temp 97.3  F (36.3  C)  Ht 5' 6\" (1.676 m)  Wt 219 lb (99.3 kg)  LMP 09/14/2017  BMI 35.35 kg/m2 Estimated body mass index is 35.35 kg/(m^2) as calculated from the following:    Height as of this encounter: 5' 6\" (1.676 m).    Weight as of this encounter: 219 lb (99.3 kg).  Medication Reconciliation: complete   Guadalupe Sanchez CMA      "

## 2018-01-02 NOTE — PATIENT INSTRUCTIONS
Thank you for choosing [facility].  You may be receiving a survey in the mail from [facility] regarding your visit today.  Please take a few minutes to complete and return the survey to let us know how we are doing.      Our Clinic hours are:  Mondays    7:20 am - 7 pm  Tues -  Fri  7:20 am - 5 pm    Clinic Phone: [phone]    The clinic lab opens at 7:30 am Mon - Fri and appointments are required.    [facility]. [phone]  Monday-Thursday 8 am - 7pm  Tues/Wed/Fri 8 am - 5:30 pm

## 2018-01-02 NOTE — MR AVS SNAPSHOT
After Visit Summary   1/2/2018    Jennifer Orlando    MRN: [id number]           Patient Information     Date Of Birth          1990        Visit Information        Provider Department      1/2/2018 2:40 PM Philipp Pringle MD [facility]        Today's Diagnoses     Urticaria    -  1      Care Instructions          Thank you for choosing [facility].  You may be receiving a survey in the mail from [facility] regarding your visit today.  Please take a few minutes to complete and return the survey to let us know how we are doing.      Our Clinic hours are:  Mondays    7:20 am - 7 pm  Tues -  Fri  7:20 am - 5 pm    Clinic Phone: [phone]    The clinic lab opens at 7:30 am Mon - Fri and appointments are required.    [facility]  Ph. [phone]  Monday-Thursday 8 am - 7pm  Tues/Wed/Fri 8 am - 5:30 pm                 Follow-ups after your visit        Who to contact     If you have questions or need follow up information about today's clinic visit or your schedule please contact [facility] directly at [phone].  Normal or non-critical lab and imaging results will be communicated to you by MyChart, letter or phone within 4 business days after the clinic has received the results. If you do not hear from us within 7 days, please contact the clinic through [employer] or phone. If you have a critical or abnormal lab result, we will notify you by phone as soon as possible.  Submit refill requests through [employer] or call your pharmacy and they will forward the refill request to us. Please allow 3 business days for your refill to be completed.          Additional Information About Your Visit        MyChart Information     [employer] gives you secure access to your electronic health record. If you see a primary care provider, you can also send messages to your care team and make appointments. If you have questions, please call your primary care clinic.  " If you do not have a primary care provider, please call [phone] and they will assist you.        Care EveryWhere ID     This is your Care EveryWhere ID. This could be used by other organizations to access your [facility] medical records  [id number]        Your Vitals Were     Pulse Temperature Height Last Period BMI (Body Mass Index)       78 97.3  F (36.3  C) 5' 6\" (1.676 m) 09/14/2017 35.35 kg/m2        Blood Pressure from Last 3 Encounters:   01/02/18 130/87   01/01/18 130/65   12/30/17 117/78    Weight from Last 3 Encounters:   01/02/18 219 lb (99.3 kg)   01/01/18 225 lb (102.1 kg)   12/29/17 220 lb (99.8 kg)              Today, you had the following     No orders found for display       Primary Care Provider Office Phone # Fax #    Philipp Pringle [phone] [address] [facility] [address]        Equal Access to Services     [facility]: Hadii aad ku hadasho Soomaali, waaxda luqadaha, qaybta kaalmada adeegyada, loyda rivera . So [facility] [phone].    ATENCIÓN: Si habla español, tiene a larsen disposición servicios gratuitos de asistencia lingüística. Llame al [phone].    We comply with applicable federal civil rights laws and [place] laws. We do not discriminate on the basis of race, color, national origin, age, disability, sex, sexual orientation, or gender identity.            Thank you!     Thank you for choosing [facility]  for your care. Our goal is always to provide you with excellent care. Hearing back from our patients is one way we can continue to improve our services. Please take a few minutes to complete the written survey that you may receive in the mail after your visit with us. Thank you!             Your Updated Medication List - Protect others around you: Learn how to safely use, store and throw away your medicines at www.disposemymeds.org.          This list is accurate as of: 1/2/18  3:37 PM.  Always use " your most recent med list.                   Brand Name Dispense Instructions for use Diagnosis    albuterol 108 (90 BASE) MCG/ACT Inhaler    PROAIR HFA/PROVENTIL HFA/VENTOLIN HFA    1 Inhaler    Inhale 2 puffs into the lungs every 6 hours as needed for shortness of breath / dyspnea (and before exercise)        EPINEPHrine 0.3 MG/0.3ML injection 2-pack    EPIPEN/ADRENACLICK/or ANY BX GENERIC EQUIV    0.6 mL    Inject 0.3 mLs (0.3 mg) into the muscle once as needed for anaphylaxis        FOLIC ACID PO      Take 800 mcg by mouth daily        misoprostol 200 MCG tablet    CYTOTEC    4 tablet    Place 1 tablet (200 mcg) vaginally once as needed    Missed ab       predniSONE 20 MG tablet    DELTASONE    6 tablet    Take 2 tablets (40 mg) by mouth daily for 3 days        prenatal multivitamin plus iron 27-0.8 MG Tabs per tablet      Take 1 tablet by mouth daily        ranitidine 150 MG tablet    ZANTAC    6 tablet    Take 1 tablet (150 mg) by mouth 2 times daily

## 2018-01-02 NOTE — PROGRESS NOTES
"  SUBJECTIVE:   Jennifer Orlando is a 27 year old female who presents to clinic today for the following health issues:      ED/UC Followup:    Facility:  [facility]   Date of visit: 1/1/18  Reason for visit: Hives   Current Status: swelling in face this morning and itching but have gone away since              Problem list and histories reviewed & adjusted, as indicated.  Additional history: as documented        Reviewed and updated as needed this visit by clinical staff  Tobacco  Allergies       Reviewed and updated as needed this visit by Provider      Further history obtained, clarified or corrected by physician:  Today is her first day of improvement from significant urticaria and angioedema over the weekend for which she was in the ER 3 times.  She is taking prednisone and will finish today and she is also using Zantac.  She still has a little bit of itchiness and rash but again is markedly improved.  She does not know of any specific causes but has a few suspicions including a new shampoo.    OBJECTIVE:  /87  Pulse 78  Temp 97.3  F (36.3  C)  Ht 5' 6\" (1.676 m)  Wt 219 lb (99.3 kg)  LMP 09/14/2017  BMI 35.35 kg/m2  LUNGS: clear to auscultation, normal breath sounds  CV: RRR without murmur  ABD: BS+, soft, nontender, no masses, no hepatosplenomegaly  EXTREMITIES: without joint tenderness, swelling or erythema.  No muscle tenderness or abnormality.  SKIN: No rashes or abnormalities  NEURO:non focal exam    ASSESSMENT:  Urticaria    PLAN:  Reassurance  We discussed treatment issues and monitoring for recurring symptoms.  I explained that she could have some recurring but likely more mild and she has had at least over the next few days but that if she has significant recurring symptoms she needs to be rechecked, particularly if there is swelling or any respiratory issues.  She also is hoping to contact an allergist and get that set up I told her she could check with us if she needed a referral.      "

## 2018-01-03 ENCOUNTER — OFFICE VISIT (OUTPATIENT)
Dept: ALLERGY | Facility: CLINIC | Age: 28
End: 2018-01-03
Payer: COMMERCIAL

## 2018-01-03 ENCOUNTER — HOSPITAL ENCOUNTER (EMERGENCY)
Facility: CLINIC | Age: 28
Discharge: HOME | End: 2018-01-03
Attending: NURSE PRACTITIONER | Admitting: NURSE PRACTITIONER
Payer: COMMERCIAL

## 2018-01-03 ENCOUNTER — NURSE TRIAGE (OUTPATIENT)
Dept: NURSING | Facility: CLINIC | Age: 28
End: 2018-01-03

## 2018-01-03 VITALS
HEIGHT: 66 IN | WEIGHT: 219 LBS | SYSTOLIC BLOOD PRESSURE: 142 MMHG | TEMPERATURE: 97.9 F | OXYGEN SATURATION: 99 % | BODY MASS INDEX: 35.2 KG/M2 | DIASTOLIC BLOOD PRESSURE: 84 MMHG | RESPIRATION RATE: 16 BRPM

## 2018-01-03 VITALS
TEMPERATURE: 97.6 F | RESPIRATION RATE: 16 BRPM | WEIGHT: 218.92 LBS | OXYGEN SATURATION: 97 % | HEART RATE: 82 BPM | HEIGHT: 66 IN | DIASTOLIC BLOOD PRESSURE: 79 MMHG | SYSTOLIC BLOOD PRESSURE: 124 MMHG | BODY MASS INDEX: 35.18 KG/M2

## 2018-01-03 DIAGNOSIS — L50.9 URTICARIA: ICD-10-CM

## 2018-01-03 DIAGNOSIS — L51.9 ERYTHEMA MULTIFORME: Primary | ICD-10-CM

## 2018-01-03 PROCEDURE — 99282 EMERGENCY DEPT VISIT SF MDM: CPT | Performed by: NURSE PRACTITIONER

## 2018-01-03 PROCEDURE — 99283 EMERGENCY DEPT VISIT LOW MDM: CPT | Mod: Z6 | Performed by: NURSE PRACTITIONER

## 2018-01-03 PROCEDURE — 99244 OFF/OP CNSLTJ NEW/EST MOD 40: CPT | Performed by: ALLERGY & IMMUNOLOGY

## 2018-01-03 RX ORDER — PREDNISONE 10 MG/1
TABLET ORAL
Qty: 70 TABLET | Refills: 0 | Status: SHIPPED | OUTPATIENT
Start: 2018-01-03 | End: 2018-04-03

## 2018-01-03 RX ORDER — CETIRIZINE HYDROCHLORIDE 10 MG/1
20 TABLET ORAL 2 TIMES DAILY
Qty: 120 TABLET | Refills: 11 | Status: ON HOLD | OUTPATIENT
Start: 2018-01-03 | End: 2018-11-20

## 2018-01-03 RX ORDER — DIPHENHYDRAMINE HCL 25 MG
25 CAPSULE ORAL EVERY 6 HOURS PRN
COMMUNITY
End: 2018-04-03

## 2018-01-03 ASSESSMENT — ENCOUNTER SYMPTOMS
NECK PAIN: 0
HEADACHES: 0
SORE THROAT: 0
EYE PAIN: 0
CHEST TIGHTNESS: 0
LIGHT-HEADEDNESS: 0
DIZZINESS: 0
EYE ITCHING: 0
ACTIVITY CHANGE: 0
DYSURIA: 0
SHORTNESS OF BREATH: 0
FEVER: 0
CHILLS: 0
VOMITING: 0
COUGH: 0
ABDOMINAL PAIN: 0
APPETITE CHANGE: 0
FATIGUE: 0
WHEEZING: 0
NAUSEA: 0

## 2018-01-03 NOTE — MR AVS SNAPSHOT
After Visit Summary   1/3/2018    Jennifer Orlando    MRN: [id number]           Patient Information     Date Of Birth          1990        Visit Information        Provider Department      1/3/2018 10:20 AM Mckenzie Jordan MD [facility]        Today's Diagnoses     Erythema multiforme    -  1      Care Instructions    If you have any questions regarding your allergies, asthma, or what we discussed during your visit today please call the allergy clinic or contact us via [employer].    [facility] ([place], [place]): [phone]      I believe your current rash is due to Erythema Multiforme and not Hives. The treatment for erythema multiforme is typically a longer course of steroids. You can also use antihistamines to help with itching.    Start the following medications:    Prednisone - Take daily as instruction. Make sure to take it with food.  Zyrtec (cetirizine) - Take 2 tablets twice daily  Zantac (ranitidine) - Take 1 tablet twice daily    If you are not improving schedule a follow-up visit with dermatology.              Follow-ups after your visit        Additional Services     DERMATOLOGY REFERRAL       Your provider has referred you to: JAYLEN: [facility] [phone]   [email or web address]    Please be aware that coverage of these services is subject to the terms and limitations of your health insurance plan.  Call member services at your health plan with any benefit or coverage questions.      Please bring the following with you to your appointment:    (1) Any X-Rays, CTs or MRIs which have been performed.  Contact the facility where they were done to arrange for  prior to your scheduled appointment.    (2) List of current medications  (3) This referral request   (4) Any documents/labs given to you for this referral                  Who to contact     If you have questions or need follow up information about todays [facility]  "visit or your schedule please contact [facility] directly at [phone].  Normal or non-critical lab and imaging results will be communicated to you by MyChart, letter or phone within 4 business days after the clinic has received the results. If you do not hear from us within 7 days, please contact the clinic through ticketscripthart or phone. If you have a critical or abnormal lab result, we will notify you by phone as soon as possible.  Submit refill requests through [employer] or call your pharmacy and they will forward the refill request to us. Please allow 3 business days for your refill to be completed.          Additional Information About Your Visit        [employer]ripthar[employer] Information     [employer] gives you secure access to your electronic health record. If you see a primary care provider, you can also send messages to your care team and make appointments. If you have questions, please call your primary care clinic.  If you do not have a primary care provider, please call [phone] and they will assist you.        Care EveryWhere ID     This is your Care EveryWhere ID. This could be used by other organizations to access your [facility] medical records  [id number]        Your Vitals Were     Pulse Temperature Respirations Height Last Period Pulse Oximetry    82 97.6  F (36.4  C) (Oral) 16 1.676 m (5' 5.98\") 09/14/2017 97%    BMI (Body Mass Index)                   35.35 kg/m2            Blood Pressure from Last 3 Encounters:   01/03/18 142/84   01/03/18 124/79   01/02/18 130/87    Weight from Last 3 Encounters:   01/03/18 99.3 kg (219 lb)   01/03/18 99.3 kg (218 lb 14.7 oz)   01/02/18 99.3 kg (219 lb)              We Performed the Following     DERMATOLOGY REFERRAL          Today's Medication Changes          These changes are accurate as of: 1/3/18 11:10 AM.  If you have any questions, ask your nurse or doctor.               Start taking these medicines.        Dose/Directions    cetirizine 10 MG tablet "   Commonly known as:  zyrTEC   Used for:  Erythema multiforme   Started by:  Mckenzie Jordan MD        Dose:  20 mg   Take 2 tablets (20 mg) by mouth 2 times daily   Quantity:  120 tablet   Refills:  11       predniSONE 10 MG tablet   Commonly known as:  DELTASONE   Used for:  Erythema multiforme   Started by:  Mckenzie Jordan MD        Take 6 tablets daily x 5 days, then 4 tablets x 5 days, then 2 tablets x 5 days, then 1 tablet x 5 days, then 1/2 tablet x 5 days   Quantity:  70 tablet   Refills:  0         These medicines have changed or have updated prescriptions.        Dose/Directions    * ranitidine 150 MG tablet   Commonly known as:  ZANTAC   This may have changed:  Another medication with the same name was added. Make sure you understand how and when to take each.        Dose:  150 mg   Take 1 tablet (150 mg) by mouth 2 times daily   Quantity:  6 tablet   Refills:  0       * ranitidine 150 MG tablet   Commonly known as:  ZANTAC   This may have changed:  You were already taking a medication with the same name, and this prescription was added. Make sure you understand how and when to take each.   Used for:  Erythema multiforme   Changed by:  Mckenzie Jordan MD        Dose:  150 mg   Take 1 tablet (150 mg) by mouth 2 times daily   Quantity:  60 tablet   Refills:  11       * Notice:  This list has 2 medication(s) that are the same as other medications prescribed for you. Read the directions carefully, and ask your doctor or other care provider to review them with you.         Where to get your medicines      These medications were sent to [facility] [place], [place] - [address]  [address] [address]     Phone:  [phone]     cetirizine 10 MG tablet    predniSONE 10 MG tablet    ranitidine 150 MG tablet                Primary Care Provider Office Phone # Fax #    Philipp Pringle [phone] [address] [address]        Equal Access to  Services     [facility]: Hadii aad ku hadaddytonny Tejalleti, waaxda luqadaha, qaybta kaalmada josuenguyenpaty, loyda rivera . So [facility] [phone].    ATENCIÓN: Si cresencio heck, tiene a larsen disposición servicios gratuitos de asistencia lingüística. Llame al [phone].    We comply with applicable federal civil rights laws and [place] laws. We do not discriminate on the basis of race, color, national origin, age, disability, sex, sexual orientation, or gender identity.            Thank you!     Thank you for choosing [facility]  for your care. Our goal is always to provide you with excellent care. Hearing back from our patients is one way we can continue to improve our services. Please take a few minutes to complete the written survey that you may receive in the mail after your visit with us. Thank you!             Your Updated Medication List - Protect others around you: Learn how to safely use, store and throw away your medicines at www.disposemymeds.org.          This list is accurate as of: 1/3/18 11:10 AM.  Always use your most recent med list.                   Brand Name Dispense Instructions for use Diagnosis    albuterol 108 (90 BASE) MCG/ACT Inhaler    PROAIR HFA/PROVENTIL HFA/VENTOLIN HFA    1 Inhaler    Inhale 2 puffs into the lungs every 6 hours as needed for shortness of breath / dyspnea (and before exercise)        BENADRYL ALLERGY 25 MG capsule   Generic drug:  diphenhydrAMINE      Take 25 mg by mouth every 6 hours as needed for itching or allergies        cetirizine 10 MG tablet    zyrTEC    120 tablet    Take 2 tablets (20 mg) by mouth 2 times daily    Erythema multiforme       EPINEPHrine 0.3 MG/0.3ML injection 2-pack    EPIPEN/ADRENACLICK/or ANY BX GENERIC EQUIV    0.6 mL    Inject 0.3 mLs (0.3 mg) into the muscle once as needed for anaphylaxis        FOLIC ACID PO      Take 800 mcg by mouth daily        predniSONE 10 MG tablet    DELTASONE    70 tablet     Take 6 tablets daily x 5 days, then 4 tablets x 5 days, then 2 tablets x 5 days, then 1 tablet x 5 days, then 1/2 tablet x 5 days    Erythema multiforme       prenatal multivitamin plus iron 27-0.8 MG Tabs per tablet      Take 1 tablet by mouth daily        * ranitidine 150 MG tablet    ZANTAC    6 tablet    Take 1 tablet (150 mg) by mouth 2 times daily        * ranitidine 150 MG tablet    ZANTAC    60 tablet    Take 1 tablet (150 mg) by mouth 2 times daily    Erythema multiforme       * Notice:  This list has 2 medication(s) that are the same as other medications prescribed for you. Read the directions carefully, and ask your doctor or other care provider to review them with you.

## 2018-01-03 NOTE — NURSING NOTE
"Chief Complaint   Patient presents with     Allergy Consult     Ref from ED for hives- started Thursday night. Been in ED x4       Initial /79 (BP Location: Left arm, Patient Position: Sitting, Cuff Size: Adult Large)  Pulse 82  Temp 97.6  F (36.4  C) (Oral)  Resp 16  Ht 1.676 m (5' 5.98\")  Wt 99.3 kg (218 lb 14.7 oz)  LMP 09/14/2017  SpO2 97%  BMI 35.35 kg/m2 Estimated body mass index is 35.35 kg/(m^2) as calculated from the following:    Height as of this encounter: 1.676 m (5' 5.98\").    Weight as of this encounter: 99.3 kg (218 lb 14.7 oz).  Medication Reconciliation: complete    "

## 2018-01-03 NOTE — ED AVS SNAPSHOT
[facility] Emergency Department    [address] [address]    Phone:  [phone]    Fax:  [phone]                                       Jennifer Orlando   MRN: [id number]    Department:  [facility] Emergency Department   Date of Visit:  1/3/2018           Patient Information     Date Of Birth          1990        Your diagnoses for this visit were:     Urticaria        You were seen by Judit Novoa APRN CNP.        Discharge Instructions         Go to allergist clinic right now.  Understanding Hives (Urticaria)  Urticaria (hives) are red, itchy, and swollen areas on the skin. They are most often an allergic reaction from eating a food or taking a medicine. Sometimes the cause may be unknown. A single hive can vary in size from a half inch to several inches. Hives can appear all over the body. Or they may appear on only one part of the body.  What causes hives?  Hives can be caused by food and beverages such as:    Tree nuts (almonds, walnuts, hazelnuts)    Peanuts    Eggs    Shellfish    Milk  Hives can also be caused by medicines such as:    Antibiotics, especially penicillin and sulfa-based medicines     Anticonvulsant or antiseizure medicines     Chemotherapy medicines   Other causes of hives include:    Infection or virus    Heat    Cold air or cold water    Exercise    Scratching or rubbing your skin, or wearing tight-fitting clothes that rub your skin    Being exposed to sunlight or light from a light bulb, in rare cases    Inhaled-chemicals in the environment from foods and drugs, insects, plants, or other sources  In some cases, hives may occur again and again with no specific cause.  If you have hives    Stay away from the food, drink, medicine, or other thing that may be causing the hives.    Ask your healthcare provider how to control itchy or irritated skin.    Talk with your healthcare provider right away if you think a medicine gave you hives.  Watch for  anaphylaxis  If you have hives, watch for symptoms of a severe reaction that can affect your entire body. This is called anaphylaxis. Symptoms can include swollen areas of the body, wheezing, trouble breathing or swallowing, and a hoarse voice. This reaction may happen right away. Or it may happen in an hour or more. In extreme cases, the airways from mouth to lungs may swell and make breathing difficult. This is a medical emergency. Use epinephrine medicine if you have it, and call 911 or go to the emergency room.     When to call your healthcare provider  Call your healthcare provider if:    Your hives feel uncomfortable    You have never had hives before    Your symptoms don't go away or come back    Your symptoms get worse or new symptoms develop such as:     Sneezing, coughing, runny or stuffy nose    Itching of the eyes, nose, or roof of the mouth    Itching, burning, stinging, or pain    Dry, flaky, cracking, or scaly skin    Red or purple spots  When to call 911  Call 911 right away if you have:    Swelling in your lips, tongue, or throat, called angioedema    Drooling    Trouble breathing, talking, or swallowing    Cool, moist or pale (blue in color) skin    Fast and weak heartbeat    Wheezing or short of breath    Feeling lightheaded or confused    Diarrhea    Severe nausea or vomiting    Seizure    Feeling dizzy or weak, or a sudden drop in blood pressure   Date Last Reviewed: 4/1/2017 2000-2017 [employer]. [address] [place] [address]. All rights reserved. This information is not intended as a substitute for professional medical care. Always follow your healthcare professional's instructions.          24 Hour Appointment Hotline       To make an appointment at any [facility] clinic, call [phone] ([phone]). If you don't have a family doctor or clinic, we will help you find one. [facility] clinics are conveniently located to serve the needs of you and your  family.             Review of your medicines      Our records show that you are taking the medicines listed below. If these are incorrect, please call your family doctor or clinic.        Dose / Directions Last dose taken    albuterol 108 (90 BASE) MCG/ACT Inhaler   Commonly known as:  PROAIR HFA/PROVENTIL HFA/VENTOLIN HFA   Dose:  2 puff   Quantity:  1 Inhaler        Inhale 2 puffs into the lungs every 6 hours as needed for shortness of breath / dyspnea (and before exercise)   Refills:  0        EPINEPHrine 0.3 MG/0.3ML injection 2-pack   Commonly known as:  EPIPEN/ADRENACLICK/or ANY BX GENERIC EQUIV   Dose:  0.3 mg   Quantity:  0.6 mL        Inject 0.3 mLs (0.3 mg) into the muscle once as needed for anaphylaxis   Refills:  1        FOLIC ACID PO   Dose:  800 mcg        Take 800 mcg by mouth daily   Refills:  0        misoprostol 200 MCG tablet   Commonly known as:  CYTOTEC   Dose:  200 mcg   Quantity:  4 tablet        Place 1 tablet (200 mcg) vaginally once as needed   Refills:  0        prenatal multivitamin plus iron 27-0.8 MG Tabs per tablet   Dose:  1 tablet        Take 1 tablet by mouth daily   Refills:  0        ranitidine 150 MG tablet   Commonly known as:  ZANTAC   Dose:  150 mg   Quantity:  6 tablet        Take 1 tablet (150 mg) by mouth 2 times daily   Refills:  0                Orders Needing Specimen Collection     None      Pending Results     No orders found from 1/1/2018 to 1/4/2018.            Pending Culture Results     No orders found from 1/1/2018 to 1/4/2018.            Pending Results Instructions     If you had any lab results that were not finalized at the time of your Discharge, you can call the ED Lab Result RN at [phone]. You will be contacted by this team for any positive Lab results or changes in treatment. The nurses are available 7 days a week from 10A to 6:30P.  You can leave a message 24 hours per day and they will return your call.        Test Results From Your Hospital Stay                Thank you for choosing [facility]       Thank you for choosing [facility] for your care. Our goal is always to provide you with excellent care. Hearing back from our patients is one way we can continue to improve our services. Please take a few minutes to complete the written survey that you may receive in the mail after you visit with us. Thank you!        [employer] Information     [employer] gives you secure access to your electronic health record. If you see a primary care provider, you can also send messages to your care team and make appointments. If you have questions, please call your primary care clinic.  If you do not have a primary care provider, please call [phone] and they will assist you.        Care EveryWhere ID     This is your Care EveryWhere ID. This could be used by other organizations to access your [facility] medical records  [id number]        Equal Access to Services     [employer] : Samy Vasques, rocio fajardo, godiwn dominguez, loyda boucher. So [facility] [phone].    ATENCIÓN: Si habla español, tiene a larsen disposición servicios gratuitos de asistencia lingüística. Llame al [phone].    We comply with applicable federal civil rights laws and [place] laws. We do not discriminate on the basis of race, color, national origin, age, disability, sex, sexual orientation, or gender identity.            After Visit Summary       This is your record. Keep this with you and show to your community pharmacist(s) and doctor(s) at your next visit.

## 2018-01-03 NOTE — TELEPHONE ENCOUNTER
Has been  Seen 3 times for hives/in the er/now just taking benadryl/was on steroids and zantac but not now/ does have some facial swelling but did yesterday as well/ talked to the ER nurse and they suggested a clinic appointment/she has someone to drive her/ she should be seen today/ if not she will go to urgent care  Jose Guadalupe Montoya RN [phone]  Reason for Disposition    [1] MODERATE-SEVERE hives persist (i.e., hives interfere with normal activities or work) AND [2] taking antihistamine (e.g., Benadryl, Claritin) > 24 hours    Additional Information    Negative: [1] Life-threatening reaction (anaphylaxis) in the past to similar substance (e.g., food, insect bite/sting, chemical, etc.) AND [2] < 2 hours since exposure    Negative: Difficulty breathing or wheezing now    Negative: [1] Swollen tongue AND [2] rapid onset    Negative: [1] Hoarseness or cough now AND [2] rapid onset    Negative: Shock suspected (e.g., cold/pale/clammy skin, too weak to stand, low BP, rapid pulse)    Negative: Difficult to awaken or acting confused  (e.g., disoriented, slurred speech)    Negative: Sounds like a life-threatening emergency to the triager    Negative: [1] Bee, wasp, or yellow jacket sting AND [2] within last 24 hours    Negative: Blood-colored, dark red or purple rash    Negative: [1] Drug rash suspected AND [2] started taking new medicine within last 2 weeks(Exception: antihistamine, eye drops, ear drops, decongestant or other OTC cough/cold medicines)    Negative: Doesn't match the SYMPTOMS of hives    Negative: Swollen tongue    Negative: [1] Widespread hives AND [2] onset < 2 hours of exposure to high-risk allergen (e.g., peanuts, tree nuts, fish or shellfish)    Negative: Patient sounds very sick or weak to the triager    Protocols used: HIVES-ADULT-

## 2018-01-03 NOTE — DISCHARGE INSTRUCTIONS
Go to allergist clinic right now.  Understanding Hives (Urticaria)  Urticaria (hives) are red, itchy, and swollen areas on the skin. They are most often an allergic reaction from eating a food or taking a medicine. Sometimes the cause may be unknown. A single hive can vary in size from a half inch to several inches. Hives can appear all over the body. Or they may appear on only one part of the body.  What causes hives?  Hives can be caused by food and beverages such as:    Tree nuts (almonds, walnuts, hazelnuts)    Peanuts    Eggs    Shellfish    Milk  Hives can also be caused by medicines such as:    Antibiotics, especially penicillin and sulfa-based medicines     Anticonvulsant or antiseizure medicines     Chemotherapy medicines   Other causes of hives include:    Infection or virus    Heat    Cold air or cold water    Exercise    Scratching or rubbing your skin, or wearing tight-fitting clothes that rub your skin    Being exposed to sunlight or light from a light bulb, in rare cases    Inhaled-chemicals in the environment from foods and drugs, insects, plants, or other sources  In some cases, hives may occur again and again with no specific cause.  If you have hives    Stay away from the food, drink, medicine, or other thing that may be causing the hives.    Ask your healthcare provider how to control itchy or irritated skin.    Talk with your healthcare provider right away if you think a medicine gave you hives.  Watch for anaphylaxis  If you have hives, watch for symptoms of a severe reaction that can affect your entire body. This is called anaphylaxis. Symptoms can include swollen areas of the body, wheezing, trouble breathing or swallowing, and a hoarse voice. This reaction may happen right away. Or it may happen in an hour or more. In extreme cases, the airways from mouth to lungs may swell and make breathing difficult. This is a medical emergency. Use epinephrine medicine if you have it, and call 911 or  go to the emergency room.     When to call your healthcare provider  Call your healthcare provider if:    Your hives feel uncomfortable    You have never had hives before    Your symptoms don't go away or come back    Your symptoms get worse or new symptoms develop such as:     Sneezing, coughing, runny or stuffy nose    Itching of the eyes, nose, or roof of the mouth    Itching, burning, stinging, or pain    Dry, flaky, cracking, or scaly skin    Red or purple spots  When to call 911  Call 911 right away if you have:    Swelling in your lips, tongue, or throat, called angioedema    Drooling    Trouble breathing, talking, or swallowing    Cool, moist or pale (blue in color) skin    Fast and weak heartbeat    Wheezing or short of breath    Feeling lightheaded or confused    Diarrhea    Severe nausea or vomiting    Seizure    Feeling dizzy or weak, or a sudden drop in blood pressure   Date Last Reviewed: 4/1/2017 2000-2017 [employer]. [address] [place] [address], [place] [address]. All rights reserved. This information is not intended as a substitute for professional medical care. Always follow your healthcare professional's instructions.

## 2018-01-03 NOTE — ED AVS SNAPSHOT
[facility] Emergency Department    [address] [address]    Phone:  [phone]    Fax:  [phone]                                       Jennifer Orlando   MRN: [id number]    Department:  [facility] Emergency Department   Date of Visit:  1/3/2018           After Visit Summary Signature Page     I have received my discharge instructions, and my questions have been answered. I have discussed any challenges I see with this plan with the nurse or doctor.    ..........................................................................................................................................  Patient/Patient Representative Signature      ..........................................................................................................................................  Patient Representative Print Name and Relationship to Patient    ..................................................               ................................................  Date                                            Time    ..........................................................................................................................................  Reviewed by Signature/Title    ...................................................              ..............................................  Date                                                            Time

## 2018-01-03 NOTE — LETTER
1/3/2018         RE: Jennifer Orlando  [address]  [address]  [place] [facility] [address]        Dear Colleague,    Thank you for referring your patient, Jennifer Orlando, to the [facility]. Please see a copy of my visit note below.    Dear Bud Watts DO,    Thank you for referring your patient Jennifer Orlando to the Allergy/Immunology Clinic. Jennifer Orlando was seen in the Allergy Clinic at [facility]. The following are my recommendations regarding her Erythema Multiforme    1. Begin prednisone taper starting with 60mg daily x 5 days  2. Continue cetirizine 20mg twice daily  3. Continue ranitidine 150mg twice daily  4. Consult placed for dermatology should symptoms persist  5. Follow-up in the allergy clinic as needed      Jennifer Orlando is a 27 year old White female being seen today in consultation for hives. Her symptoms began last week Thursday and since then she has been seen in the ED several times. Jennifer reports that she initially developed lip swelling and itching of the back of her head and went to the ED for evaluation. She was prescribed some medications but her symptoms worsened the following day and she presented again for evaluation. Jennifer states that her symptoms have been progressively worsening. Yesterday it seemed as though the lesions had nearly completely resolved however this morning her symptoms are worse that they have ever been. She returned to the ED today and was sent to the allergy clinic for further evaluation. Jennifer reports that the rash is very itchy and the lesions are persistent and have not resolved. There has been some fading of the lesions but they have remained in the same area. Initially the rash appeared on her arms and face but has now also spread to her chest and legs. Jennifer has been prescribed ranitidine and prednisone and is also taking benadryl as needed. None of the medications have seemed to make much difference but she only took the prednisone for 3  days.    Jennifer had a recent miscarriage and took 1 dose of cytotec due to complications from the miscarriage. She denies having any other recent colds, illness, or other stress. She has no history of recurrent cold sores or oral ulcers. She denies associated nausea, vomiting, cough, shortness of breath, chest pain, cough, dizziness, lightheadedness, or difficulty swallowing with her current symptoms.      Past Medical History:   Diagnosis Date     Chickenpox      Obese      Otitis media     Recurrent otitis     Family History   Problem Relation Age of Onset     Hypertension Mother      DIABETES Mother      DIABETES Maternal Grandmother      Hypertension Maternal Grandmother      Hypertension Maternal Grandfather      Autoimmune Disease Maternal Grandfather      lupus     Hypertension Paternal Grandmother      HEART DISEASE Paternal Grandmother      stroke     Cancer - colorectal Paternal Grandfather      in his 70's     GASTROINTESTINAL DISEASE Brother      stomach ulcer     Past Surgical History:   Procedure Laterality Date     DILATION AND CURETTAGE SUCTION N/A 11/29/2017    Procedure: DILATION AND CURETTAGE SUCTION;  Dilation and Curettage Suction;  Surgeon: Triny Soni MD;  Location: [place] OR     [facility]      x 2       ENVIRONMENTAL HISTORY: The family [place] [facility] [place]. The home is heated with a forced air and gas furnace. They does have central air conditioning. The patient's bedroom is furnished with carpeting in bedroom and fabric window coverings.  Pets inside the house include 1 dog(s). There is no history of cockroach or mice infestation. There is/are 0 smokers in the house.  The house does not have a damp basement.     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 She lives with her spouse.  Her spouse works as a manager at [employer] (meat department).    REVIEW OF SYSTEMS:  General: negative for weight gain. negative  for weight loss. negative for changes in sleep.   Eyes: negative for itching. negative for redness. negative for tearing/watering.  Ears: negative for fullness. negative for hearing loss. negative for dizziness.   Nose: negative for snoring.negative for changes in smell. positive  for drainage.   Throat: negative for hoarseness. negative for sore throat. negative for trouble swallowing.   Lungs: negative for shortness of breath.negative for wheezing. positive  for sputum production.   Cardiovascular: negative for chest pain. negative for swelling of ankles. negative for fast or irregular heartbeat.   Gastrointestinal: negative for nausea. negative for heartburn. negative for acid reflux.   Musculoskeletal: negative for joint pain. negative for joint stiffness. negative for joint swelling.   Neurologic: negative for seizures. negative for fainting. negative for weakness.   Psychiatric: negative for changes in mood. negative for anxiety.   Endocrine: negative for cold intolerance. negative for heat intolerance. negative for tremors.   Hematologic: negative for easy bruising. negative for easy bleeding.  Integumentary: positive  for rash. negative for scaling. negative for nail changes.       Current Outpatient Prescriptions:      albuterol (PROAIR HFA/PROVENTIL HFA/VENTOLIN HFA) 108 (90 BASE) MCG/ACT Inhaler, Inhale 2 puffs into the lungs every 6 hours as needed for shortness of breath / dyspnea (and before exercise) (Patient not taking: Reported on 1/2/2018), Disp: 1 Inhaler, Rfl: 0     EPINEPHrine (EPIPEN/ADRENACLICK/OR ANY BX GENERIC EQUIV) 0.3 MG/0.3ML injection 2-pack, Inject 0.3 mLs (0.3 mg) into the muscle once as needed for anaphylaxis, Disp: 0.6 mL, Rfl: 1     ranitidine (ZANTAC) 150 MG tablet, Take 1 tablet (150 mg) by mouth 2 times daily, Disp: 6 tablet, Rfl: 0     misoprostol (CYTOTEC) 200 MCG tablet, Place 1 tablet (200 mcg) vaginally once as needed (Patient not taking: Reported on 1/2/2018), Disp: 4  "tablet, Rfl: 0     Prenatal Vit-Fe Fumarate-FA (PRENATAL MULTIVITAMIN PLUS IRON) 27-0.8 MG TABS per tablet, Take 1 tablet by mouth daily, Disp: , Rfl:      FOLIC ACID PO, Take 800 mcg by mouth daily, Disp: , Rfl:   Immunization History   Administered Date(s) Administered     DTAP (<7y) 1991, 1991, 1991, 06/01/1992, 04/27/1996     HEPA 03/13/2009, 04/22/2013     HPV 03/13/2009, 05/27/2009, 04/22/2013     HepB 12/03/2002, 01/07/2003, 07/01/2003     Hib (PRP-T) 1991, 1991, 1991     Influenza Vaccine IM 3yrs+ 4 Valent [id number] 11/18/2013, 09/29/2016     MMR 06/01/1992, 12/03/2002, 08/16/2016     Meningococcal (Menactra ) 03/13/2009     OPV, trivalent, live 1991, 1991, 06/01/1992, 04/27/1996     TD (ADULT, 7+) 01/07/2003     TDAP Vaccine (Adacel) 04/22/2013     Varicella 12/31/2002     Allergies   Allergen Reactions     Pineapple Hives     Ceclor [Cefaclor] Rash     As a baby, doesn't remember. Can take amox/augmentin         EXAM:   /79 (BP Location: Left arm, Patient Position: Sitting, Cuff Size: Adult Large)  Pulse 82  Temp 97.6  F (36.4  C) (Oral)  Resp 16  Ht 1.676 m (5' 5.98\")  Wt 99.3 kg (218 lb 14.7 oz)  LMP 09/14/2017  SpO2 97%  BMI 35.35 kg/m2  GENERAL APPEARANCE: alert, cooperative and not in distress  SKIN: numerous round, slightly raised, lesions consisting of erythematous border with central clearing noted on forehead, cheeks, chin, neck, back, arms, legs, hands, and palms.  HEAD: atraumatic, normocephalic  EYES: lids and lashes normal, conjunctivae and sclerae clear, pupils equal, round, reactive to light, EOM full and intact  ENT: no scars or lesions, nasal exam showed no discharge, swelling or lesions noted, otoscopy showed external auditory canals clear, tympanic membranes normal, tongue midline and normal, soft palate, uvula, and tonsils normal  NECK: no asymmetry, masses, or scars, supple without significant adenopathy  LUNGS: unlabored " respirations, no intercostal retractions or accessory muscle use, clear to auscultation without rales or wheezes  HEART: regular rate and rhythm without murmurs and normal S1 and S2  MUSCULOSKELETAL: no musculoskeletal defects are noted  NEURO: no focal deficits noted  PSYCH: does not appear depressed or anxious    WORKUP: None    ASSESSMENT/PLAN:  Jennifer Orlando is a 27 year old female here for evaluation of hives. Her current rash does not have identifiable wheals and is not consistent in appearance with urticaria. The rash does appear to be consistent with erythema multiforme. Jennifer was counseled regarding the pathophysiology of erythema multiforme as well as possible known triggers however we discussed that in most cases no specific cause is identified. We also reviewed treatment options including oral steroids or continued monitoring.     1. Begin prolonged prednisone taper starting with 60mg daily x 5 days  2. Continue cetirizine 20mg twice daily  3. Continue ranitidine 150mg twice daily  4. Consult placed for dermatology should symptoms persist  5. Follow-up in the allergy clinic as needed      Mckenzie Jordan MD  Allergy/Immunology  [place], [place]      Chart documentation done in part with Dragon Voice Recognition Software. Although reviewed after completion, some word and grammatical errors may remain.      Again, thank you for allowing me to participate in the care of your patient.        Sincerely,        Mckenzie Jordan MD

## 2018-01-03 NOTE — PROGRESS NOTES
Dear Bud Watts DO,    Thank you for referring your patient Jennifer Orlando to the Allergy/Immunology Clinic. Jennifer Orlando was seen in the Allergy Clinic at [facility]. The following are my recommendations regarding her Erythema Multiforme    1. Begin prednisone taper starting with 60mg daily x 5 days  2. Continue cetirizine 20mg twice daily  3. Continue ranitidine 150mg twice daily  4. Consult placed for dermatology should symptoms persist  5. Follow-up in the allergy clinic as needed      Jennifer Orlando is a 27 year old White female being seen today in consultation for hives. Her symptoms began last week Thursday and since then she has been seen in the ED several times. Jennifer reports that she initially developed lip swelling and itching of the back of her head and went to the ED for evaluation. She was prescribed some medications but her symptoms worsened the following day and she presented again for evaluation. Jennifer states that her symptoms have been progressively worsening. Yesterday it seemed as though the lesions had nearly completely resolved however this morning her symptoms are worse that they have ever been. She returned to the ED today and was sent to the allergy clinic for further evaluation. Jennifer reports that the rash is very itchy and the lesions are persistent and have not resolved. There has been some fading of the lesions but they have remained in the same area. Initially the rash appeared on her arms and face but has now also spread to her chest and legs. Jennifer has been prescribed ranitidine and prednisone and is also taking benadryl as needed. None of the medications have seemed to make much difference but she only took the prednisone for 3 days.    Jennifer had a recent miscarriage and took 1 dose of cytotec due to complications from the miscarriage. She denies having any other recent colds, illness, or other stress. She has no history of recurrent cold sores or oral ulcers. She  denies associated nausea, vomiting, cough, shortness of breath, chest pain, cough, dizziness, lightheadedness, or difficulty swallowing with her current symptoms.      Past Medical History:   Diagnosis Date     Chickenpox      Obese      Otitis media     Recurrent otitis     Family History   Problem Relation Age of Onset     Hypertension Mother      DIABETES Mother      DIABETES Maternal Grandmother      Hypertension Maternal Grandmother      Hypertension Maternal Grandfather      Autoimmune Disease Maternal Grandfather      lupus     Hypertension Paternal Grandmother      HEART DISEASE Paternal Grandmother      stroke     Cancer - colorectal Paternal Grandfather      in his 70's     GASTROINTESTINAL DISEASE Brother      stomach ulcer     Past Surgical History:   Procedure Laterality Date     DILATION AND CURETTAGE SUCTION N/A 11/29/2017    Procedure: DILATION AND CURETTAGE SUCTION;  Dilation and Curettage Suction;  Surgeon: Triny Soni MD;  Location: [place] OR     PE TUBES      x 2       ENVIRONMENTAL HISTORY: The family lives in a [facility] [place]. The home is heated with a forced air and gas furnace. They does have central air conditioning. The patient's bedroom is furnished with carpeting in bedroom and fabric window coverings.  Pets inside the house include 1 dog(s). There is no history of cockroach or mice infestation. There is/are 0 smokers in the house.  The house does not have a damp basement.     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 She lives with her spouse.  Her spouse works as a manager at [employer] (meat department).    REVIEW OF SYSTEMS:  General: negative for weight gain. negative for weight loss. negative for changes in sleep.   Eyes: negative for itching. negative for redness. negative for tearing/watering.  Ears: negative for fullness. negative for hearing loss. negative for dizziness.   Nose: negative for  snoring.negative for changes in smell. positive  for drainage.   Throat: negative for hoarseness. negative for sore throat. negative for trouble swallowing.   Lungs: negative for shortness of breath.negative for wheezing. positive  for sputum production.   Cardiovascular: negative for chest pain. negative for swelling of ankles. negative for fast or irregular heartbeat.   Gastrointestinal: negative for nausea. negative for heartburn. negative for acid reflux.   Musculoskeletal: negative for joint pain. negative for joint stiffness. negative for joint swelling.   Neurologic: negative for seizures. negative for fainting. negative for weakness.   Psychiatric: negative for changes in mood. negative for anxiety.   Endocrine: negative for cold intolerance. negative for heat intolerance. negative for tremors.   Hematologic: negative for easy bruising. negative for easy bleeding.  Integumentary: positive  for rash. negative for scaling. negative for nail changes.       Current Outpatient Prescriptions:      albuterol (PROAIR HFA/PROVENTIL HFA/VENTOLIN HFA) 108 (90 BASE) MCG/ACT Inhaler, Inhale 2 puffs into the lungs every 6 hours as needed for shortness of breath / dyspnea (and before exercise) (Patient not taking: Reported on 1/2/2018), Disp: 1 Inhaler, Rfl: 0     EPINEPHrine (EPIPEN/ADRENACLICK/OR ANY BX GENERIC EQUIV) 0.3 MG/0.3ML injection 2-pack, Inject 0.3 mLs (0.3 mg) into the muscle once as needed for anaphylaxis, Disp: 0.6 mL, Rfl: 1     ranitidine (ZANTAC) 150 MG tablet, Take 1 tablet (150 mg) by mouth 2 times daily, Disp: 6 tablet, Rfl: 0     misoprostol (CYTOTEC) 200 MCG tablet, Place 1 tablet (200 mcg) vaginally once as needed (Patient not taking: Reported on 1/2/2018), Disp: 4 tablet, Rfl: 0     Prenatal Vit-Fe Fumarate-FA (PRENATAL MULTIVITAMIN PLUS IRON) 27-0.8 MG TABS per tablet, Take 1 tablet by mouth daily, Disp: , Rfl:      FOLIC ACID PO, Take 800 mcg by mouth daily, Disp: , Rfl:   Immunization History  "  Administered Date(s) Administered     DTAP (<7y) 1991, 1991, 1991, 06/01/1992, 04/27/1996     HEPA 03/13/2009, 04/22/2013     HPV 03/13/2009, 05/27/2009, 04/22/2013     HepB 12/03/2002, 01/07/2003, 07/01/2003     Hib (PRP-T) 1991, 1991, 1991     Influenza Vaccine IM 3yrs+ 4 Valent IIV4 11/18/2013, 09/29/2016     MMR 06/01/1992, 12/03/2002, 08/16/2016     Meningococcal (Menactra ) 03/13/2009     OPV, trivalent, live 1991, 1991, 06/01/1992, 04/27/1996     TD (ADULT, 7+) 01/07/2003     TDAP Vaccine (Adacel) 04/22/2013     Varicella 12/31/2002     Allergies   Allergen Reactions     Pineapple Hives     Ceclor [Cefaclor] Rash     As a baby, doesn't remember. Can take amox/augmentin         EXAM:   /79 (BP Location: Left arm, Patient Position: Sitting, Cuff Size: Adult Large)  Pulse 82  Temp 97.6  F (36.4  C) (Oral)  Resp 16  Ht 1.676 m (5' 5.98\")  Wt 99.3 kg (218 lb 14.7 oz)  LMP 09/14/2017  SpO2 97%  BMI 35.35 kg/m2  GENERAL APPEARANCE: alert, cooperative and not in distress  SKIN: numerous round, slightly raised, lesions consisting of erythematous border with central clearing noted on forehead, cheeks, chin, neck, back, arms, legs, hands, and palms.  HEAD: atraumatic, normocephalic  EYES: lids and lashes normal, conjunctivae and sclerae clear, pupils equal, round, reactive to light, EOM full and intact  ENT: no scars or lesions, nasal exam showed no discharge, swelling or lesions noted, otoscopy showed external auditory canals clear, tympanic membranes normal, tongue midline and normal, soft palate, uvula, and tonsils normal  NECK: no asymmetry, masses, or scars, supple without significant adenopathy  LUNGS: unlabored respirations, no intercostal retractions or accessory muscle use, clear to auscultation without rales or wheezes  HEART: regular rate and rhythm without murmurs and normal S1 and S2  MUSCULOSKELETAL: no musculoskeletal defects are " noted  NEURO: no focal deficits noted  PSYCH: does not appear depressed or anxious    WORKUP: None    ASSESSMENT/PLAN:  Jennifer Orlando is a 27 year old female here for evaluation of hives. Her current rash does not have identifiable wheals and is not consistent in appearance with urticaria. The rash does appear to be consistent with erythema multiforme. Jennifer was counseled regarding the pathophysiology of erythema multiforme as well as possible known triggers however we discussed that in most cases no specific cause is identified. We also reviewed treatment options including oral steroids or continued monitoring.     1. Begin prolonged prednisone taper starting with 60mg daily x 5 days  2. Continue cetirizine 20mg twice daily  3. Continue ranitidine 150mg twice daily  4. Consult placed for dermatology should symptoms persist  5. Follow-up in the allergy clinic as needed      Mckenzie Jordan MD  Allergy/Immunology  [facility] and [place], [place]      Chart documentation done in part with Dragon Voice Recognition Software. Although reviewed after completion, some word and grammatical errors may remain.

## 2018-01-03 NOTE — PATIENT INSTRUCTIONS
If you have any questions regarding your allergies, asthma, or what we discussed during your visit today please call the allergy clinic or contact us via What's in My Handbag.    [facility] ([place], [place]): [phone]      I believe your current rash is due to Erythema Multiforme and not Hives. The treatment for erythema multiforme is typically a longer course of steroids. You can also use antihistamines to help with itching.    Start the following medications:    Prednisone - Take daily as instruction. Make sure to take it with food.  Zyrtec (cetirizine) - Take 2 tablets twice daily  Zantac (ranitidine) - Take 1 tablet twice daily    If you are not improving schedule a follow-up visit with dermatology.

## 2018-01-03 NOTE — ED PROVIDER NOTES
History     Chief Complaint   Patient presents with     Hives     hives since thursday night, seen here, hives worsening. last benedryl at 6 am     HPI  Jennifer Orlando is a 27 year old female who presents to the emergency department for recurrent hive-like rash that started again last night at approximately 3am. Initially this started 5 days ago.  Patient was evaluated here in the ED 3 times in the last 5 days for ongoing hives vs erythema multiforme. Patient treated with Zantac and Prednisone the last 3 days, finished yesterday morning. Patient did have angioedema of the lower lip during one of her visits here and received epinephrine.  Reports symptoms of rash and edema completely resolved yesterday. No further lip swelling at this time.  Denies fever or chills. Denies nausea or vomiting.     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 08/04/2016     Priority: Medium     Obesity 04/22/2013     Priority: Medium     CARDIOVASCULAR SCREENING; LDL GOAL LESS THAN 160 10/10/2012     Priority: Medium     Menstrual migraine 08/13/2012     Priority: Medium     Elevated blood pressure reading without diagnosis of hypertension 08/13/2012     Priority: Medium        Past Medical History:    Past Medical History:   Diagnosis Date     Chickenpox      Obese      Otitis media        Past Surgical History:    Past Surgical History:   Procedure Laterality Date     DILATION AND CURETTAGE SUCTION N/A 11/29/2017    Procedure: DILATION AND CURETTAGE SUCTION;  Dilation and Curettage Suction;  Surgeon: Triny Soni MD;  Location: [facility] OR     PE TUBES      x 2       Family History:    Family History   Problem Relation Age of Onset     Hypertension Mother      DIABETES Mother      DIABETES Maternal Grandmother      Hypertension Maternal Grandmother      Hypertension Maternal Grandfather      Autoimmune Disease Maternal Grandfather      lupus     Hypertension  "Paternal Grandmother      HEART DISEASE Paternal Grandmother      stroke     Cancer - colorectal Paternal Grandfather      in his 70's     GASTROINTESTINAL DISEASE Brother      stomach ulcer       Social History:  Marital Status:   [2]  Social History   Substance Use Topics     Smoking status: Never Smoker     Smokeless tobacco: Never Used     Alcohol use No      Comment: occas.- quit with pregnancy        Medications:      diphenhydrAMINE (BENADRYL ALLERGY) 25 MG capsule   predniSONE (DELTASONE) 10 MG tablet   cetirizine (ZYRTEC) 10 MG tablet   ranitidine (ZANTAC) 150 MG tablet   albuterol (PROAIR HFA/PROVENTIL HFA/VENTOLIN HFA) 108 (90 BASE) MCG/ACT Inhaler   EPINEPHrine (EPIPEN/ADRENACLICK/OR ANY BX GENERIC EQUIV) 0.3 MG/0.3ML injection 2-pack   ranitidine (ZANTAC) 150 MG tablet   Prenatal Vit-Fe Fumarate-FA (PRENATAL MULTIVITAMIN PLUS IRON) 27-0.8 MG TABS per tablet   FOLIC ACID PO         Review of Systems   Constitutional: Negative for activity change, appetite change, chills, fatigue and fever.   HENT: Negative for ear discharge, ear pain and sore throat.    Eyes: Negative for pain and itching.   Respiratory: Negative for cough, chest tightness, shortness of breath and wheezing.    Gastrointestinal: Negative for abdominal pain, nausea and vomiting.   Genitourinary: Negative for dysuria.   Musculoskeletal: Negative for neck pain.   Skin: Positive for rash (pruritic).   Neurological: Negative for dizziness, light-headedness and headaches.       Physical Exam   BP: 142/84  Heart Rate: 94  Temp: 97.9  F (36.6  C)  Resp: 16  Height: 167.6 cm (5' 6\")  Weight: 99.3 kg (219 lb)  SpO2: 99 %      Physical Exam   Constitutional: She is oriented to person, place, and time. She appears well-developed and well-nourished. No distress.   HENT:   Head: Normocephalic and atraumatic.   Right Ear: External ear normal.   Left Ear: External ear normal.   Nose: Nose normal.   Mouth/Throat: Oropharynx is clear and moist. "   Eyes: Conjunctivae and EOM are normal.   Cardiovascular: Normal rate, regular rhythm and normal heart sounds.    No murmur heard.  Pulmonary/Chest: Effort normal and breath sounds normal. No respiratory distress. She has no wheezes.   Abdominal: Soft. Bowel sounds are normal. She exhibits no distension. There is no tenderness.   Musculoskeletal: Normal range of motion.   Neurological: She is alert and oriented to person, place, and time.   Skin:   Urticarial rash on face and arms. Some of the areas on the arm have central clearing and irregular erythematous border. Not significantly raised.        ED Course     ED Course     Procedures          Labs Ordered and Resulted from Time of ED Arrival Up to the Time of Departure from the ED - No data to display    Assessments & Plan (with Medical Decision Making)   No acute respiratory distress or angioedema or anaphylaxis. Unclear etiology for her rash. Discussed with patient that I would likely recommend we restart the prednisone but I believe she would benefit from seeing the allergist. I spoke with allergy clinic and they can see the patient now. Patient discharge to allergy clinic for further evaluation and management.   I have reviewed the nursing notes.    I have reviewed the findings, diagnosis, plan and need for follow up with the patient.      Discharge Medication List as of 1/3/2018 10:20 AM          Final diagnoses:   Urticaria       1/3/2018   [facility] EMERGENCY DEPARTMENT     Judit Novoa APRN CNP  01/03/18 1930

## 2018-01-03 NOTE — LETTER
January 3, 2018      Jennifer Orlando  [address]  [address]  [place] [address]        To Whom It May Concern:    Jennifer Orlando was seen in our clinic on 1/3/18. Please excuse her from work due to this visit. She may return to work without restrictions.      Sincerely,        Mckenzie Jordan MD

## 2018-01-29 ENCOUNTER — TELEPHONE (OUTPATIENT)
Dept: DERMATOLOGY | Facility: CLINIC | Age: 28
End: 2018-01-29

## 2018-01-29 ENCOUNTER — OFFICE VISIT (OUTPATIENT)
Dept: DERMATOLOGY | Facility: CLINIC | Age: 28
End: 2018-01-29
Payer: COMMERCIAL

## 2018-01-29 VITALS — OXYGEN SATURATION: 100 % | SYSTOLIC BLOOD PRESSURE: 130 MMHG | HEART RATE: 90 BPM | DIASTOLIC BLOOD PRESSURE: 88 MMHG

## 2018-01-29 DIAGNOSIS — L50.9 URTICARIA: ICD-10-CM

## 2018-01-29 DIAGNOSIS — D48.5 NEOPLASM OF UNCERTAIN BEHAVIOR OF SKIN: ICD-10-CM

## 2018-01-29 DIAGNOSIS — L51.9 ERYTHEMA MULTIFORME: Primary | ICD-10-CM

## 2018-01-29 PROCEDURE — 36415 COLL VENOUS BLD VENIPUNCTURE: CPT | Performed by: PHYSICIAN ASSISTANT

## 2018-01-29 PROCEDURE — 11100 HC BIOPSY SKIN/SUBQ/MUC MEM, SINGLE LESION: CPT | Performed by: PHYSICIAN ASSISTANT

## 2018-01-29 PROCEDURE — 86803 HEPATITIS C AB TEST: CPT | Performed by: PHYSICIAN ASSISTANT

## 2018-01-29 PROCEDURE — 86160 COMPLEMENT ANTIGEN: CPT | Performed by: PHYSICIAN ASSISTANT

## 2018-01-29 PROCEDURE — 88313 SPECIAL STAINS GROUP 2: CPT | Performed by: PHYSICIAN ASSISTANT

## 2018-01-29 PROCEDURE — 86038 ANTINUCLEAR ANTIBODIES: CPT | Performed by: PHYSICIAN ASSISTANT

## 2018-01-29 PROCEDURE — 88305 TISSUE EXAM BY PATHOLOGIST: CPT | Performed by: PHYSICIAN ASSISTANT

## 2018-01-29 PROCEDURE — 88346 IMFLUOR 1ST 1ANTB STAIN PX: CPT | Performed by: PHYSICIAN ASSISTANT

## 2018-01-29 PROCEDURE — 99000 SPECIMEN HANDLING OFFICE-LAB: CPT | Performed by: PHYSICIAN ASSISTANT

## 2018-01-29 PROCEDURE — 86235 NUCLEAR ANTIGEN ANTIBODY: CPT | Performed by: PHYSICIAN ASSISTANT

## 2018-01-29 PROCEDURE — 99203 OFFICE O/P NEW LOW 30 MIN: CPT | Mod: 25 | Performed by: PHYSICIAN ASSISTANT

## 2018-01-29 RX ORDER — VALACYCLOVIR HYDROCHLORIDE 500 MG/1
TABLET, FILM COATED ORAL
Qty: 30 TABLET | Refills: 11 | Status: ON HOLD | OUTPATIENT
Start: 2018-01-29 | End: 2018-11-19

## 2018-01-29 NOTE — MR AVS SNAPSHOT
After Visit Summary   1/29/2018    Jennifer Orlando    MRN: [id number]           Patient Information     Date Of Birth          1990        Visit Information        Provider Department      1/29/2018 4:45 PM Ally Ramírez PA-C [facility]        Today's Diagnoses     Erythema multiforme    -  1    Urticaria        Neoplasm of uncertain behavior of skin          Care Instructions          Wound Care Instructions     FOR SUPERFICIAL WOUNDS     [facility] [phone]    [facility] [phone]                       AFTER 24 HOURS YOU SHOULD REMOVE THE BANDAGE AND BEGIN DAILY DRESSING CHANGES AS FOLLOWS:     1) Remove Dressing.     2) Clean and dry the area with tap water using a Q-tip or sterile gauze pad.     3) Apply Vaseline, Aquaphor, Polysporin ointment or Bacitracin ointment over entire wound.  Do NOT use Neosporin ointment.     4) Cover the wound with a band-aid, or a sterile non-stick gauze pad and micropore paper tape      REPEAT THESE INSTRUCTIONS AT LEAST ONCE A DAY UNTIL THE WOUND HAS COMPLETELY HEALED.    It is an old wives tale that a wound heals better when it is exposed to air and allowed to dry out. The wound will heal faster with a better cosmetic result if it is kept moist with ointment and covered with a bandage.    **Do not let the wound dry out.**      Supplies Needed:      *Cotton tipped applicators (Q-tips)    *Polysporin Ointment or Bacitracin Ointment (NOT NEOSPORIN)    *Band-aids or non-stick gauze pads and micropore paper tape.      PATIENT INFORMATION:    During the healing process you will notice a number of changes. All wounds develop a small halo of redness surrounding the wound.  This means healing is occurring. Severe itching with extensive redness usually indicates sensitivity to the ointment or bandage tape used to dress the wound.  You should call our office if this develops.      Swelling  and/or discoloration around your  surgical site is common, particularly when performed around the eye.    All wounds normally drain.  The larger the wound the more drainage there will be.  After 7-10 days, you will notice the wound beginning to shrink and new skin will begin to grow.  The wound is healed when you can see skin has formed over the entire area.  A healed wound has a healthy, shiny look to the surface and is red to dark pink in color to normalize.  Wounds may take approximately 4-6 weeks to heal.  Larger wounds may take 6-8 weeks.  After the wound is healed you may discontinue dressing changes.    You may experience a sensation of tightness as your wound heals. This is normal and will gradually subside.    Your healed wound may be sensitive to temperature changes. This sensitivity improves with time, but if you re having a lot of discomfort, try to avoid temperature extremes.    Patients frequently experience itching after their wound appears to have healed because of the continue healing under the skin.  Plain Vaseline will help relieve the itching.        POSSIBLE COMPLICATIONS    BLEEDIN. Leave the bandage in place.  2. Use tightly rolled up gauze or a cloth to apply direct pressure over the bandage for 30  minutes.  3. Reapply pressure for an additional 30 minutes if necessary  4. Use additional gauze and tape to maintain pressure once the bleeding has stopped.            Follow-ups after your visit        Who to contact     If you have questions or need follow up information about today's clinic visit or your schedule please contact [facility] directly at [phone].  Normal or non-critical lab and imaging results will be communicated to you by MyChart, letter or phone within 4 business days after the clinic has received the results. If you do not hear from us within 7 days, please contact the clinic through MyChart or phone. If you have a critical or abnormal lab result, we will notify you by phone as soon  as possible.  Submit refill requests through [employer] or call your pharmacy and they will forward the refill request to us. Please allow 3 business days for your refill to be completed.          Additional Information About Your Visit        [email or web address] (formerly known as Local[employer]) Information     My [employer] Database gives you secure access to your electronic health record. If you see a primary care provider, you can also send messages to your care team and make appointments. If you have questions, please call your primary care clinic.  If you do not have a primary care provider, please call [phone] and they will assist you.        Care EveryWhere ID     This is your Care EveryWhere ID. This could be used by other organizations to access your [facility] medical records  [id number]        Your Vitals Were     Pulse Last Period Pulse Oximetry             90 09/14/2017 100%          Blood Pressure from Last 3 Encounters:   01/29/18 130/88   01/03/18 142/84   01/03/18 124/79    Weight from Last 3 Encounters:   01/03/18 99.3 kg (219 lb)   01/03/18 99.3 kg (218 lb 14.7 oz)   01/02/18 99.3 kg (219 lb)              We Performed the Following     Anti Nuclear Trish IgG by IFA with Reflex     BIOPSY SKIN/SUBQ/MUC MEM, SINGLE LESION     Complement C3     Complement C4     NICHOLAS antibody panel     Hepatitis C antibody     Surgical pathology exam          Today's Medication Changes          These changes are accurate as of 1/29/18  5:26 PM.  If you have any questions, ask your nurse or doctor.               Start taking these medicines.        Dose/Directions    valACYclovir 500 MG tablet   Commonly known as:  VALTREX   Used for:  Erythema multiforme   Started by:  Ally Ramírez PA-C        1 tab PO daily   Quantity:  30 tablet   Refills:  11            Where to get your medicines      These medications were sent to [facility] [place], [place] - [address]  [address] [place] [address]     Phone:  [phone]     valACYclovir 500 MG  tablet                Primary Care Provider Office Phone # Fax #    Philipp Pringle [phone] [address] [facility] [address]        Equal Access to Services     ELLIOTEZEQUIEL TEJAL : Hadii todd ku hadaddyo Soomaali, waaxda luqadaha, qaybta kaalmada adeegyada, loyda sanchesivette sander. So [facility] [phone].    ATENCIÓN: Si habla español, tiene a larsen disposición servicios gratuitos de asistencia lingüística. Llame al [phone].    We comply with applicable federal civil rights laws and [place] laws. We do not discriminate on the basis of race, color, national origin, age, disability, sex, sexual orientation, or gender identity.            Thank you!     Thank you for choosing [facility]  for your care. Our goal is always to provide you with excellent care. Hearing back from our patients is one way we can continue to improve our services. Please take a few minutes to complete the written survey that you may receive in the mail after your visit with us. Thank you!             Your Updated Medication List - Protect others around you: Learn how to safely use, store and throw away your medicines at www.disposemymeds.org.          This list is accurate as of 1/29/18  5:26 PM.  Always use your most recent med list.                   Brand Name Dispense Instructions for use Diagnosis    albuterol 108 (90 BASE) MCG/ACT Inhaler    PROAIR HFA/PROVENTIL HFA/VENTOLIN HFA    1 Inhaler    Inhale 2 puffs into the lungs every 6 hours as needed for shortness of breath / dyspnea (and before exercise)        BENADRYL ALLERGY 25 MG capsule   Generic drug:  diphenhydrAMINE      Take 25 mg by mouth every 6 hours as needed for itching or allergies        cetirizine 10 MG tablet    zyrTEC    120 tablet    Take 2 tablets (20 mg) by mouth 2 times daily    Erythema multiforme       EPINEPHrine 0.3 MG/0.3ML injection 2-pack    EPIPEN/ADRENACLICK/or ANY BX GENERIC EQUIV    0.6 mL     Inject 0.3 mLs (0.3 mg) into the muscle once as needed for anaphylaxis        FOLIC ACID PO      Take 800 mcg by mouth daily        predniSONE 10 MG tablet    DELTASONE    70 tablet    Take 6 tablets daily x 5 days, then 4 tablets x 5 days, then 2 tablets x 5 days, then 1 tablet x 5 days, then 1/2 tablet x 5 days    Erythema multiforme       prenatal multivitamin plus iron 27-0.8 MG Tabs per tablet      Take 1 tablet by mouth daily        ranitidine 150 MG tablet    ZANTAC    60 tablet    Take 1 tablet (150 mg) by mouth 2 times daily    Erythema multiforme       valACYclovir 500 MG tablet    VALTREX    30 tablet    1 tab PO daily    Erythema multiforme

## 2018-01-29 NOTE — TELEPHONE ENCOUNTER
Called patient, left message to call back regarding appointment today at 5:45. Per provider, see if she can come at 4:30 or 4:45 today (ok to put in hold appointment slot).    Laurie Hines LPN

## 2018-01-29 NOTE — PATIENT INSTRUCTIONS
Wound Care Instructions     FOR SUPERFICIAL WOUNDS     [facility] [phone]    [facility] [phone]                       AFTER 24 HOURS YOU SHOULD REMOVE THE BANDAGE AND BEGIN DAILY DRESSING CHANGES AS FOLLOWS:     1) Remove Dressing.     2) Clean and dry the area with tap water using a Q-tip or sterile gauze pad.     3) Apply Vaseline, Aquaphor, Polysporin ointment or Bacitracin ointment over entire wound.  Do NOT use Neosporin ointment.     4) Cover the wound with a band-aid, or a sterile non-stick gauze pad and micropore paper tape      REPEAT THESE INSTRUCTIONS AT LEAST ONCE A DAY UNTIL THE WOUND HAS COMPLETELY HEALED.    It is an old wives tale that a wound heals better when it is exposed to air and allowed to dry out. The wound will heal faster with a better cosmetic result if it is kept moist with ointment and covered with a bandage.    **Do not let the wound dry out.**      Supplies Needed:      *Cotton tipped applicators (Q-tips)    *Polysporin Ointment or Bacitracin Ointment (NOT NEOSPORIN)    *Band-aids or non-stick gauze pads and micropore paper tape.      PATIENT INFORMATION:    During the healing process you will notice a number of changes. All wounds develop a small halo of redness surrounding the wound.  This means healing is occurring. Severe itching with extensive redness usually indicates sensitivity to the ointment or bandage tape used to dress the wound.  You should call our office if this develops.      Swelling  and/or discoloration around your surgical site is common, particularly when performed around the eye.    All wounds normally drain.  The larger the wound the more drainage there will be.  After 7-10 days, you will notice the wound beginning to shrink and new skin will begin to grow.  The wound is healed when you can see skin has formed over the entire area.  A healed wound has a healthy, shiny look to the surface and is red to dark pink in color  to normalize.  Wounds may take approximately 4-6 weeks to heal.  Larger wounds may take 6-8 weeks.  After the wound is healed you may discontinue dressing changes.    You may experience a sensation of tightness as your wound heals. This is normal and will gradually subside.    Your healed wound may be sensitive to temperature changes. This sensitivity improves with time, but if you re having a lot of discomfort, try to avoid temperature extremes.    Patients frequently experience itching after their wound appears to have healed because of the continue healing under the skin.  Plain Vaseline will help relieve the itching.        POSSIBLE COMPLICATIONS    BLEEDIN. Leave the bandage in place.  2. Use tightly rolled up gauze or a cloth to apply direct pressure over the bandage for 30  minutes.  3. Reapply pressure for an additional 30 minutes if necessary  4. Use additional gauze and tape to maintain pressure once the bleeding has stopped.

## 2018-01-29 NOTE — LETTER
1/29/2018         RE: Jennifer Orlando  [address]  [address]  [place] [address]        Dear Colleague,    Thank you for referring your patient, Jennifer Orlando, to the [facility]. Please see a copy of my visit note below.    HPI:   Jennifer Orlando is a 27 year old female who presents for evaluation of a rash on the legs  chief complaint  Location: bilateral legs.     1 month ago, she developed swelling in the lips and redness in the face. Was treated in the ER with presumed allergic reaction/hives. Same swelling returned 1 day later and she was treated again with prednisone and antihistamines. Was seen by allergy who felt her rash most closely resembled erythema multiforme. She was put on a longer taper of prednisone which virtually eliminated the rash.  Since that time she will get small flare-ups of itchy lesions around her chest/face which last a few hours then resolve. For the past 1 week she has had discoloration on her bilateral thighs; discoloration was not associated with the previous EM lesions.     Condition present for:  1 month.   Previous treatments include: prednisone, anti-histamines    Review Of Systems  Eyes: negative  Ears/Nose/Throat: negative  Respiratory: No shortness of breath, dyspnea on exertion, cough, or hemoptysis  Cardiovascular: negative  Gastrointestinal: negative  Genitourinary: negative  Musculoskeletal: negative  Neurologic: negative  Psychiatric: negative        PHYSICAL EXAM:      Skin exam performed as follows: Type 2 skin. Mood appropriate  Alert and Oriented X 3. Well developed, well nourished in no distress.  General appearance: Normal  Head including face: Normal  Eyes: conjunctiva and lids: Normal  Mouth: Lips, teeth, gums: Normal  Neck: Normal  Chest-breast/axillae: Normal  Back: Normal  Spleen and liver: Normal  Cardiovascular: Exam of peripheral vascular system by observation for swelling, varicosities, edema: Normal  Genitalia: groin, buttocks:  Normal  Extremities: digits/nails (clubbing): Normal  Eccrine and Apocrine glands: Normal  Right upper extremity: Normal  Left upper extremity: Normal  Right lower extremity: Normal  Left lower extremity: Normal  Skin: Scalp and body hair: See below    1. Hyperpigmented patches on bilateral thighs with slight firmness of subcutaneous tissue    ASSESSMENT/PLAN:     1. R/o erythema nodosum vs lupus vs PIH vs other on right thigh. After informed written consent was obtained, using Betadine for cleansing and 1% Lidocaine with epinephrine for anesthetic, with sterile technique a 4 mm punch biopsy was used to obtain a biopsy specimen of the lesion; adipose tissue was obtained. Specimen was divided for H&E and DIF.  Hemostasis was obtained by pressure and wound was sutured. Antibiotic dressing is applied, and wound care instructions provided. Be alert for any signs of cutaneous infection. The specimen is labeled and sent to pathology for evaluation. The procedure was well tolerated without complications.  --Check CBC, CMP, LAVELLE, NICHOLAS, Compliment  --Will await H&E and DIF  --She is not uncomfortable; knows she can call for prednisone if needed  --Start Valtrex QD (for EM)        Follow-up: pending path  CC:   Scribed By: Ally Ramírez, MS, PASULEMA      Again, thank you for allowing me to participate in the care of your patient.        Sincerely,        Ally Ramírez PA-C

## 2018-01-29 NOTE — NURSING NOTE
"Initial /88  Pulse 90  LMP 09/14/2017  SpO2 100% Estimated body mass index is 35.35 kg/(m^2) as calculated from the following:    Height as of 1/3/18: 1.676 m (5' 5.98\").    Weight as of 1/3/18: 99.3 kg (218 lb 14.7 oz). .      "

## 2018-01-30 LAB
ANA SER QL IF: NEGATIVE
C3 SERPL-MCNC: 127 MG/DL (ref 76–169)
C4 SERPL-MCNC: 27 MG/DL (ref 15–50)
ENA RNP IGG SER IA-ACNC: <0.2 AI (ref 0–0.9)
ENA SCL70 IGG SER IA-ACNC: <0.2 AI (ref 0–0.9)
ENA SM IGG SER-ACNC: <0.2 AI (ref 0–0.9)
ENA SS-A IGG SER IA-ACNC: <0.2 AI (ref 0–0.9)
ENA SS-B IGG SER IA-ACNC: <0.2 AI (ref 0–0.9)
HCV AB SERPL QL IA: NONREACTIVE

## 2018-01-30 NOTE — PROGRESS NOTES
HPI:   Jennifer Orlando is a 27 year old female who presents for evaluation of a rash on the legs  chief complaint  Location: bilateral legs.     1 month ago, she developed swelling in the lips and redness in the face. Was treated in the ER with presumed allergic reaction/hives. Same swelling returned 1 day later and she was treated again with prednisone and antihistamines. Was seen by allergy who felt her rash most closely resembled erythema multiforme. She was put on a longer taper of prednisone which virtually eliminated the rash.  Since that time she will get small flare-ups of itchy lesions around her chest/face which last a few hours then resolve. For the past 1 week she has had discoloration on her bilateral thighs; discoloration was not associated with the previous EM lesions.     Condition present for:  1 month.   Previous treatments include: prednisone, anti-histamines    Review Of Systems  Eyes: negative  Ears/Nose/Throat: negative  Respiratory: No shortness of breath, dyspnea on exertion, cough, or hemoptysis  Cardiovascular: negative  Gastrointestinal: negative  Genitourinary: negative  Musculoskeletal: negative  Neurologic: negative  Psychiatric: negative        PHYSICAL EXAM:      Skin exam performed as follows: Type 2 skin. Mood appropriate  Alert and Oriented X 3. Well developed, well nourished in no distress.  General appearance: Normal  Head including face: Normal  Eyes: conjunctiva and lids: Normal  Mouth: Lips, teeth, gums: Normal  Neck: Normal  Chest-breast/axillae: Normal  Back: Normal  Spleen and liver: Normal  Cardiovascular: Exam of peripheral vascular system by observation for swelling, varicosities, edema: Normal  Genitalia: groin, buttocks: Normal  Extremities: digits/nails (clubbing): Normal  Eccrine and Apocrine glands: Normal  Right upper extremity: Normal  Left upper extremity: Normal  Right lower extremity: Normal  Left lower extremity: Normal  Skin: Scalp and body hair: See below    1.  Hyperpigmented patches on bilateral thighs with slight firmness of subcutaneous tissue    ASSESSMENT/PLAN:     1. R/o erythema nodosum vs lupus vs PIH vs other on right thigh. After informed written consent was obtained, using Betadine for cleansing and 1% Lidocaine with epinephrine for anesthetic, with sterile technique a 4 mm punch biopsy was used to obtain a biopsy specimen of the lesion; adipose tissue was obtained. Specimen was divided for H&E and DIF.  Hemostasis was obtained by pressure and wound was sutured. Antibiotic dressing is applied, and wound care instructions provided. Be alert for any signs of cutaneous infection. The specimen is labeled and sent to pathology for evaluation. The procedure was well tolerated without complications.  --Check CBC, CMP, LAVELLE, NICHOLAS, Compliment  --Will await H&E and DIF  --She is not uncomfortable; knows she can call for prednisone if needed  --Start Valtrex QD (for EM)        Follow-up: pending path  CC:   Scribed By: Ally Ramírez, MS, PA-C

## 2018-02-02 LAB — COPATH REPORT: NORMAL

## 2018-02-13 ENCOUNTER — ALLIED HEALTH/NURSE VISIT (OUTPATIENT)
Dept: FAMILY MEDICINE | Facility: CLINIC | Age: 28
End: 2018-02-13
Payer: COMMERCIAL

## 2018-02-13 DIAGNOSIS — Z48.02 ENCOUNTER FOR REMOVAL OF SUTURES: Primary | ICD-10-CM

## 2018-02-13 PROCEDURE — 99207 ZZC NO CHARGE NURSE ONLY: CPT

## 2018-02-13 NOTE — MR AVS SNAPSHOT
After Visit Summary   2/13/2018    Jennifer Orlando    MRN: [id number]           Patient Information     Date Of Birth          1990        Visit Information        Provider Department      2/13/2018 9:30 AM [place] HU RN [facility]        Today's Diagnoses     Encounter for removal of sutures    -  1       Follow-ups after your visit        Who to contact     Normal or non-critical lab and imaging results will be communicated to you by ImageProtecthart, letter or phone within 4 business days after the clinic has received the results. If you do not hear from us within 7 days, please contact the clinic through ImageProtecthart or phone. If you have a critical or abnormal lab result, we will notify you by phone as soon as possible.  Submit refill requests through HappyFactory or call your pharmacy and they will forward the refill request to us. Please allow 3 business days for your refill to be completed.          If you need to speak with a  for additional information , please call: [phone]             Additional Information About Your Visit        ImageProtectharGrid Mobile Information     HappyFactory gives you secure access to your electronic health record. If you see a primary care provider, you can also send messages to your care team and make appointments. If you have questions, please call your primary care clinic.  If you do not have a primary care provider, please call [phone] and they will assist you.        Care EveryWhere ID     This is your Care EveryWhere ID. This could be used by other organizations to access your [place] medical records  [id number]        Your Vitals Were     Last Period                   09/14/2017            Blood Pressure from Last 3 Encounters:   01/29/18 130/88   01/03/18 142/84   01/03/18 124/79    Weight from Last 3 Encounters:   01/03/18 219 lb (99.3 kg)   01/03/18 218 lb 14.7 oz (99.3 kg)   01/02/18 219 lb (99.3 kg)              Today, you had the following     No orders  found for display       Primary Care Provider Office Phone # Fax #    Philipp Pringle [phone] [id number]  [facility] [address]        Equal Access to Services     GRISEL TOURE : Hadii aad ku hadaddyo Soomaali, waaxda luqadaha, qaybta kaalmada adeegyada, loyda reichn josue brooks laPachecogrant boucher. So [facility] [phone].    ATENCIÓN: Si habla español, tiene a larsen disposición servicios gratuitos de asistencia lingüística. Llame al [phone].    We comply with applicable federal civil rights laws and [place] laws. We do not discriminate on the basis of race, color, national origin, age, disability, sex, sexual orientation, or gender identity.            Thank you!     Thank you for choosing [facility]  for your care. Our goal is always to provide you with excellent care. Hearing back from our patients is one way we can continue to improve our services. Please take a few minutes to complete the written survey that you may receive in the mail after your visit with us. Thank you!             Your Updated Medication List - Protect others around you: Learn how to safely use, store and throw away your medicines at www.disposemymeds.org.          This list is accurate as of 2/13/18  9:47 AM.  Always use your most recent med list.                   Brand Name Dispense Instructions for use Diagnosis    albuterol 108 (90 BASE) MCG/ACT Inhaler    PROAIR HFA/PROVENTIL HFA/VENTOLIN HFA    1 Inhaler    Inhale 2 puffs into the lungs every 6 hours as needed for shortness of breath / dyspnea (and before exercise)        BENADRYL ALLERGY 25 MG capsule   Generic drug:  diphenhydrAMINE      Take 25 mg by mouth every 6 hours as needed for itching or allergies        cetirizine 10 MG tablet    zyrTEC    120 tablet    Take 2 tablets (20 mg) by mouth 2 times daily    Erythema multiforme       EPINEPHrine 0.3 MG/0.3ML injection 2-pack    EPIPEN/ADRENACLICK/or ANY BX GENERIC EQUIV    0.6 mL    Inject  0.3 mLs (0.3 mg) into the muscle once as needed for anaphylaxis        FOLIC ACID PO      Take 800 mcg by mouth daily        predniSONE 10 MG tablet    DELTASONE    70 tablet    Take 6 tablets daily x 5 days, then 4 tablets x 5 days, then 2 tablets x 5 days, then 1 tablet x 5 days, then 1/2 tablet x 5 days    Erythema multiforme       prenatal multivitamin plus iron 27-0.8 MG Tabs per tablet      Take 1 tablet by mouth daily        ranitidine 150 MG tablet    ZANTAC    60 tablet    Take 1 tablet (150 mg) by mouth 2 times daily    Erythema multiforme       valACYclovir 500 MG tablet    VALTREX    30 tablet    1 tab PO daily    Erythema multiforme

## 2018-02-13 NOTE — PROGRESS NOTES
Jennifer presents to the clinic today for  removal of sutures.  The patient has had the sutures in place for 15 days.    There has been no history of infection or drainage.    O: 2 sutures are seen located on the right anterior thigh.  The wound is healing well with no signs of infection.    Tetanus status is up to date. 4/22/13    A: Suture removal.    P:  All sutures were easily removed today.  Routine wound care discussed.  The patient will follow up as needed.    Ryan Ni RN

## 2018-03-30 ENCOUNTER — APPOINTMENT (OUTPATIENT)
Dept: OBGYN | Facility: CLINIC | Age: 28
End: 2018-03-30
Payer: COMMERCIAL

## 2018-04-03 ENCOUNTER — PRENATAL OFFICE VISIT (OUTPATIENT)
Dept: OBGYN | Facility: CLINIC | Age: 28
End: 2018-04-03
Payer: COMMERCIAL

## 2018-04-03 ENCOUNTER — TELEPHONE (OUTPATIENT)
Dept: OBGYN | Facility: CLINIC | Age: 28
End: 2018-04-03

## 2018-04-03 DIAGNOSIS — Z34.00 PRENATAL CARE, FIRST PREGNANCY: Primary | ICD-10-CM

## 2018-04-03 PROCEDURE — 99207 ZZC NO CHARGE NURSE ONLY: CPT | Performed by: OBSTETRICS & GYNECOLOGY

## 2018-04-03 NOTE — TELEPHONE ENCOUNTER
Reason for Call:  Other     Detailed comments: EDC: 11/2018 (LMP: 02/19/2018)    Pt has first OB scheduled for 04/26/2018. Pt noticed some spotting yesterday (felt like a clot, but was not a clot) - no bleeding yet this morning. Wondering if this is normal or if it's okay to wait until 04/26. Pt has had a previous miscarriage. - Please advise    Phone Number Patient can be reached at: Home number on file [phone] (home)    Best Time: Any    Can we leave a detailed message on this number? YES    Call taken on 4/3/2018 at 8:21 AM by Denise Behrendt

## 2018-04-03 NOTE — TELEPHONE ENCOUNTER
Return call to patient.  Spoke with patient on the phone.    S-(situation): spotting x1 in early pregnancy last night. Patient reports scant amount of brown discharge this morning. Patient denies recent intercourse. Patient denies diarrhea or constipation. Patient reports mild nausea in the morning yesterday. Patient denies cramping or pain. Patient blood type is RH+.    B-(background): miscarriage x1 currently pregnant LMP 2/19/18 ~ 6 weeks today    A-(assessment): episode of spotting x1    R-(recommendations): Reassurance. Reviewed warning sign of spotting. Offered HCG trending and/or close monitoring. Reviewed to monitor for bright red bleeding and abdominal pain or cramping. Reviewed when to be seen in the ER.  Patient declines blood testing at this time.    Call clinic with questions/concerns.    Pt in agreement and reports understanding.    Flower Davila   [facility] Clinic  RN

## 2018-04-03 NOTE — MR AVS SNAPSHOT
After Visit Summary   4/3/2018    Jennifer Orlando    MRN: [id number]           Patient Information     Date Of Birth          1990        Visit Information        Provider Department      4/3/2018 6:36 PM Triny Soni MD [facility]        Today's Diagnoses     Prenatal care, first pregnancy    -  1       Follow-ups after your visit        Your next 10 appointments already scheduled     Apr 26, 2018  2:00 PM CDT   New Prenatal with Triny Soni MD, [facility]    [address] [address]   [phone]              Who to contact     If you have questions or need follow up information about today's clinic visit or your schedule please contact [facility] directly at [phone].  Normal or non-critical lab and imaging results will be communicated to you by MyChart, letter or phone within 4 business days after the clinic has received the results. If you do not hear from us within 7 days, please contact the clinic through MyChart or phone. If you have a critical or abnormal lab result, we will notify you by phone as soon as possible.  Submit refill requests through Alohar Mobile or call your pharmacy and they will forward the refill request to us. Please allow 3 business days for your refill to be completed.          Additional Information About Your Visit        MyChart Information     Alohar Mobile gives you secure access to your electronic health record. If you see a primary care provider, you can also send messages to your care team and make appointments. If you have questions, please call your primary care clinic.  If you do not have a primary care provider, please call [phone] and they will assist you.        Care EveryWhere ID     This is your Care EveryWhere ID. This could be used by other organizations to access your [place] medical records  [id number]        Your Vitals Were      Last Period                   02/19/2018            Blood Pressure from Last 3 Encounters:   01/29/18 130/88   01/03/18 142/84   01/03/18 124/79    Weight from Last 3 Encounters:   01/03/18 99.3 kg (219 lb)   01/03/18 99.3 kg (218 lb 14.7 oz)   01/02/18 99.3 kg (219 lb)              Today, you had the following     No orders found for display       Primary Care Provider Office Phone # Fax #    Philipp Pringle [phone] [address] [facility] [address]        Equal Access to Services     [facility]: Hadii todd ho hadasho Soleti, waaxda luqadaha, qaybta kaalmada adelindseyyada, loyda rivera . So [facility] [phone].    ATENCIÓN: Si habla español, tiene a larsen disposición servicios gratuitos de asistencia lingüística. Llame al [phone].    We comply with applicable federal civil rights laws and [place] laws. We do not discriminate on the basis of race, color, national origin, age, disability, sex, sexual orientation, or gender identity.            Thank you!     Thank you for choosing [facility]  for your care. Our goal is always to provide you with excellent care. Hearing back from our patients is one way we can continue to improve our services. Please take a few minutes to complete the written survey that you may receive in the mail after your visit with us. Thank you!             Your Updated Medication List - Protect others around you: Learn how to safely use, store and throw away your medicines at www.disposemymeds.org.          This list is accurate as of 4/3/18  6:41 PM.  Always use your most recent med list.                   Brand Name Dispense Instructions for use Diagnosis    albuterol 108 (90 BASE) MCG/ACT Inhaler    PROAIR HFA/PROVENTIL HFA/VENTOLIN HFA    1 Inhaler    Inhale 2 puffs into the lungs every 6 hours as needed for shortness of breath / dyspnea (and before exercise)        cetirizine 10 MG tablet    zyrTEC    120 tablet     Take 2 tablets (20 mg) by mouth 2 times daily    Erythema multiforme       EPINEPHrine 0.3 MG/0.3ML injection 2-pack    EPIPEN/ADRENACLICK/or ANY BX GENERIC EQUIV    0.6 mL    Inject 0.3 mLs (0.3 mg) into the muscle once as needed for anaphylaxis        prenatal multivitamin plus iron 27-0.8 MG Tabs per tablet      Take 1 tablet by mouth daily        ranitidine 150 MG tablet    ZANTAC    60 tablet    Take 1 tablet (150 mg) by mouth 2 times daily    Erythema multiforme       valACYclovir 500 MG tablet    VALTREX    30 tablet    1 tab PO daily    Erythema multiforme

## 2018-04-26 ENCOUNTER — PRENATAL OFFICE VISIT (OUTPATIENT)
Dept: OBGYN | Facility: CLINIC | Age: 28
End: 2018-04-26
Payer: COMMERCIAL

## 2018-04-26 VITALS
HEIGHT: 66 IN | HEART RATE: 88 BPM | SYSTOLIC BLOOD PRESSURE: 131 MMHG | WEIGHT: 221 LBS | TEMPERATURE: 98.8 F | RESPIRATION RATE: 16 BRPM | BODY MASS INDEX: 35.52 KG/M2 | DIASTOLIC BLOOD PRESSURE: 84 MMHG

## 2018-04-26 DIAGNOSIS — Z34.01 ENCOUNTER FOR PRENATAL CARE IN FIRST TRIMESTER OF FIRST PREGNANCY: Primary | ICD-10-CM

## 2018-04-26 LAB
ALBUMIN UR-MCNC: NEGATIVE MG/DL
APPEARANCE UR: CLEAR
BILIRUB UR QL STRIP: NEGATIVE
COLOR UR AUTO: YELLOW
ERYTHROCYTE [DISTWIDTH] IN BLOOD BY AUTOMATED COUNT: 13.6 % (ref 10–15)
GLUCOSE UR STRIP-MCNC: NEGATIVE MG/DL
HCT VFR BLD AUTO: 36.7 % (ref 35–47)
HGB BLD-MCNC: 12.2 G/DL (ref 11.7–15.7)
HGB UR QL STRIP: NEGATIVE
KETONES UR STRIP-MCNC: NEGATIVE MG/DL
LEUKOCYTE ESTERASE UR QL STRIP: NEGATIVE
MCH RBC QN AUTO: 27.9 PG (ref 26.5–33)
MCHC RBC AUTO-ENTMCNC: 33.2 G/DL (ref 31.5–36.5)
MCV RBC AUTO: 84 FL (ref 78–100)
NITRATE UR QL: NEGATIVE
PH UR STRIP: 5.5 PH (ref 5–7)
PLATELET # BLD AUTO: 196 10E9/L (ref 150–450)
RBC # BLD AUTO: 4.38 10E12/L (ref 3.8–5.2)
SOURCE: NORMAL
SP GR UR STRIP: 1.01 (ref 1–1.03)
UROBILINOGEN UR STRIP-ACNC: 0.2 EU/DL (ref 0.2–1)
WBC # BLD AUTO: 7.5 10E9/L (ref 4–11)

## 2018-04-26 PROCEDURE — 87389 HIV-1 AG W/HIV-1&-2 AB AG IA: CPT | Performed by: OBSTETRICS & GYNECOLOGY

## 2018-04-26 PROCEDURE — 86780 TREPONEMA PALLIDUM: CPT | Performed by: OBSTETRICS & GYNECOLOGY

## 2018-04-26 PROCEDURE — 86850 RBC ANTIBODY SCREEN: CPT | Performed by: OBSTETRICS & GYNECOLOGY

## 2018-04-26 PROCEDURE — 85027 COMPLETE CBC AUTOMATED: CPT | Performed by: OBSTETRICS & GYNECOLOGY

## 2018-04-26 PROCEDURE — 36415 COLL VENOUS BLD VENIPUNCTURE: CPT | Performed by: OBSTETRICS & GYNECOLOGY

## 2018-04-26 PROCEDURE — 87491 CHLMYD TRACH DNA AMP PROBE: CPT | Performed by: OBSTETRICS & GYNECOLOGY

## 2018-04-26 PROCEDURE — 81003 URINALYSIS AUTO W/O SCOPE: CPT | Performed by: OBSTETRICS & GYNECOLOGY

## 2018-04-26 PROCEDURE — 76817 TRANSVAGINAL US OBSTETRIC: CPT | Performed by: OBSTETRICS & GYNECOLOGY

## 2018-04-26 PROCEDURE — 87591 N.GONORRHOEAE DNA AMP PROB: CPT | Performed by: OBSTETRICS & GYNECOLOGY

## 2018-04-26 PROCEDURE — 87340 HEPATITIS B SURFACE AG IA: CPT | Performed by: OBSTETRICS & GYNECOLOGY

## 2018-04-26 PROCEDURE — 86900 BLOOD TYPING SEROLOGIC ABO: CPT | Performed by: OBSTETRICS & GYNECOLOGY

## 2018-04-26 PROCEDURE — 87086 URINE CULTURE/COLONY COUNT: CPT | Performed by: OBSTETRICS & GYNECOLOGY

## 2018-04-26 PROCEDURE — 86901 BLOOD TYPING SEROLOGIC RH(D): CPT | Performed by: OBSTETRICS & GYNECOLOGY

## 2018-04-26 PROCEDURE — 99207 ZZC FIRST OB VISIT: CPT | Performed by: OBSTETRICS & GYNECOLOGY

## 2018-04-26 PROCEDURE — 86762 RUBELLA ANTIBODY: CPT | Performed by: OBSTETRICS & GYNECOLOGY

## 2018-04-26 NOTE — NURSING NOTE
"Chief Complaint   Patient presents with     Prenatal Care       Initial /84 (BP Location: Left arm, Patient Position: Chair, Cuff Size: Adult Large)  Pulse 88  Temp 98.8  F (37.1  C) (Tympanic)  Resp 16  Ht 5' 5.75\" (1.67 m)  Wt 221 lb (100.2 kg)  LMP 02/19/2018  Breastfeeding? No  BMI 35.94 kg/m2 Estimated body mass index is 35.94 kg/(m^2) as calculated from the following:    Height as of this encounter: 5' 5.75\" (1.67 m).    Weight as of this encounter: 221 lb (100.2 kg).  Medication Reconciliation: complete   Juliet Burkett CMA      "

## 2018-04-26 NOTE — MR AVS SNAPSHOT
After Visit Summary   4/26/2018    Jennifer Orlando    MRN: [id number]           Patient Information     Date Of Birth          1990        Visit Information        Provider Department      4/26/2018 2:00 PM Triny Soni MD; [facility]        Today's Diagnoses     Encounter for prenatal care in first trimester of first pregnancy    -  1       Follow-ups after your visit        Your next 10 appointments already scheduled     May 09, 2018 11:30 AM CDT   ESTABLISHED PRENATAL with Chanel Watson MD   [facility]    [address] [address]   [phone]              Who to contact     If you have questions or need follow up information about today's clinic visit or your schedule please contact [facility] directly at [phone].  Normal or non-critical lab and imaging results will be communicated to you by MyChart, letter or phone within 4 business days after the clinic has received the results. If you do not hear from us within 7 days, please contact the clinic through Angiocrine Biosciencehart or phone. If you have a critical or abnormal lab result, we will notify you by phone as soon as possible.  Submit refill requests through Weele or call your pharmacy and they will forward the refill request to us. Please allow 3 business days for your refill to be completed.          Additional Information About Your Visit        MyChart Information     Weele gives you secure access to your electronic health record. If you see a primary care provider, you can also send messages to your care team and make appointments. If you have questions, please call your primary care clinic.  If you do not have a primary care provider, please call [phone] and they will assist you.        Care EveryWhere ID     This is your Care EveryWhere ID. This could be used by other organizations to access your [place]  "medical records  [id number]        Your Vitals Were     Pulse Temperature Respirations Height Last Period Breastfeeding?    88 98.8  F (37.1  C) (Tympanic) 16 5' 5.75\" (1.67 m) 02/19/2018 No    BMI (Body Mass Index)                   35.94 kg/m2            Blood Pressure from Last 3 Encounters:   04/26/18 131/84   01/29/18 130/88   01/03/18 142/84    Weight from Last 3 Encounters:   04/26/18 221 lb (100.2 kg)   01/03/18 219 lb (99.3 kg)   01/03/18 218 lb 14.7 oz (99.3 kg)              We Performed the Following     *UA reflex to Microscopic     ABO/Rh type and screen     Anti Treponema     CBC with platelets     Chlamydia trachomatis PCR     Hepatitis B surface antigen     HIV Antigen Antibody Combo     Neisseria gonorrhoeae PCR     Rubella Antibody IgG Quantitative     Urine Culture Aerobic Bacterial     US OB <14 Weeks w Transvaginal Single        Primary Care Provider Office Phone # Fax #    Philipp Pringle [phone] [address] [facility] [address]        Equal Access to Services     [facility] : Hadii todd ho hadasho Soleti, waaxda luqadaha, qaybta kaalmada angelica, loyda rivera . So [facility] [phone].    ATENCIÓN: Si habla español, tiene a larsen disposición servicios gratuitos de asistencia lingüística. LlCleveland Clinic [facility] [phone].    We comply with applicable federal civil rights laws and [place] laws. We do not discriminate on the basis of race, color, national origin, age, disability, sex, sexual orientation, or gender identity.            Thank you!     Thank you for choosing [facility]  for your care. Our goal is always to provide you with excellent care. Hearing back from our patients is one way we can continue to improve our services. Please take a few minutes to complete the written survey that you may receive in the mail after your visit with us. Thank you!             Your Updated Medication List - Protect others around you: " Learn how to safely use, store and throw away your medicines at www.disposemymeds.org.          This list is accurate as of 4/26/18  2:40 PM.  Always use your most recent med list.                   Brand Name Dispense Instructions for use Diagnosis    albuterol 108 (90 Base) MCG/ACT Inhaler    PROAIR HFA/PROVENTIL HFA/VENTOLIN HFA    1 Inhaler    Inhale 2 puffs into the lungs every 6 hours as needed for shortness of breath / dyspnea (and before exercise)        cetirizine 10 MG tablet    zyrTEC    120 tablet    Take 2 tablets (20 mg) by mouth 2 times daily    Erythema multiforme       EPINEPHrine 0.3 MG/0.3ML injection 2-pack    EPIPEN/ADRENACLICK/or ANY BX GENERIC EQUIV    0.6 mL    Inject 0.3 mLs (0.3 mg) into the muscle once as needed for anaphylaxis        prenatal multivitamin plus iron 27-0.8 MG Tabs per tablet      Take 1 tablet by mouth daily        ranitidine 150 MG tablet    ZANTAC    60 tablet    Take 1 tablet (150 mg) by mouth 2 times daily    Erythema multiforme       valACYclovir 500 MG tablet    VALTREX    30 tablet    1 tab PO daily    Erythema multiforme

## 2018-04-26 NOTE — PROGRESS NOTES
Jennifer is a 28 year old  @ 9.3 weeks here for new ob visit.  Has had a few spotting episodes      ROS: Ten point review of systems was reviewed and negative except the above.  Current Issues include: Sab symptoms:  spotting  nausea, mild  fatigue    OBhx: SAB x 1  Gyne: Pap smears Normal  history of STD No STD history  Past Medical History:   Diagnosis Date     Chickenpox      Obese      Otitis media     Recurrent otitis     Past Surgical History:   Procedure Laterality Date     DILATION AND CURETTAGE SUCTION N/A 2017    Procedure: DILATION AND CURETTAGE SUCTION;  Dilation and Curettage Suction;  Surgeon: Triny Soni MD;  Location: [facility] OR     PE TUBES      x 2     Patient Active Problem List    Diagnosis Date Noted     Prenatal care, first pregnancy 2018     Priority: Medium     [place]-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 Can take amox/augmentin       Current Outpatient Prescriptions on File Prior to Visit:  albuterol (PROAIR HFA/PROVENTIL HFA/VENTOLIN HFA) 108 (90 BASE) MCG/ACT Inhaler Inhale 2 puffs into the lungs every 6 hours as needed for shortness of breath / dyspnea (and before exercise) (Patient not taking: Reported on 4/3/2018)   cetirizine (ZYRTEC) 10 MG tablet Take 2 tablets (20 mg) by mouth 2 times daily (Patient not taking: Reported on 4/3/2018)   EPINEPHrine (EPIPEN/ADRENACLICK/OR ANY BX GENERIC EQUIV) 0.3 MG/0.3ML injection 2-pack Inject 0.3 mLs (0.3 mg) into the muscle once as needed for anaphylaxis (Patient not taking: Reported on 4/3/2018)   Prenatal  "Vit-Fe Fumarate-FA (PRENATAL MULTIVITAMIN PLUS IRON) 27-0.8 MG TABS per tablet Take 1 tablet by mouth daily   ranitidine (ZANTAC) 150 MG tablet Take 1 tablet (150 mg) by mouth 2 times daily (Patient not taking: Reported on 4/3/2018)   valACYclovir (VALTREX) 500 MG tablet 1 tab PO daily (Patient not taking: Reported on 4/3/2018)     No current facility-administered medications on file prior to visit.     Past Medical History of Father of Baby:   No significant medical history    Physical Exam: /84 (BP Location: Left arm, Patient Position: Chair, Cuff Size: Adult Large)  Pulse 88  Temp 98.8  F (37.1  C) (Tympanic)  Resp 16  Ht 5' 5.75\" (1.67 m)  Wt 221 lb (100.2 kg)  LMP 2018  Breastfeeding? No  BMI 35.94 kg/m2  General: Well developed, well nourished female  Skin: Normal  HEENT: Normal  Neck: Supple,no adenopathy,thyroid normal  Chest: Clear  Heart: Regular rate, rhythm,No murmur, rub, gallop  Breasts: Not examined   Abdomen: Benign,Soft, flat, non-tender,No masses, organomegaly,No inguinal nodes,Bowel sounds normoactive   Extremities: Normal  Neurological: Normal   Perineum: Normal   Vulva: Normal  Vagina: Normal mucosa, no discharge  Cervix: Nulliparous, closed, mobile,  no discharge  Uterus: 8-10 weeks, Normal shape, position and consistency   Adnexa: Normal  Rectum: deferred, Normal without lesion or mass   Bony Pelvis: Adequate     Transvaginal ultrasound was performed.  A viable intrauterine pregnancy was seen.  CRL consistent with 9 weeks, 1 days.  Fetal heart motion was visualized.  Subchorionic bleed ~ 2.5 cm present    EDC by LMP: 18   EDC by sono:  18  Final EDC: 18    A/P 28 year old  at  9.3 weeks    1. Discussed physician coverage, tertiary support, diet, exercise, weight gain, schedule of visits, routine and indicated ultrasounds, and childbirth education.    2. Options for  testing for chromosomal and birth defects were discussed with the patient " including nuchal lucency/blood marker testing in the first trimester and quad screening and/or Level 2 ultrasound in the second trimester.  We discussed that these are screening tests and not diagnostic tests and that false positives and negatives are a distinct possibility.  We discussed that follow up diagnostic testing would include chorionic villus sampling or amniocentesis depending on gestational age.    3. Prenatal labs, GC, Chlamydia    4. Prenatal Vitamins      Triny Soni M.D.

## 2018-04-27 LAB
ABO + RH BLD: NORMAL
ABO + RH BLD: NORMAL
BLD GP AB SCN SERPL QL: NORMAL
BLOOD BANK CMNT PATIENT-IMP: NORMAL
C TRACH DNA SPEC QL NAA+PROBE: NEGATIVE
HBV SURFACE AG SERPL QL IA: NONREACTIVE
HIV 1+2 AB+HIV1 P24 AG SERPL QL IA: NONREACTIVE
N GONORRHOEA DNA SPEC QL NAA+PROBE: NEGATIVE
RUBV IGG SERPL IA-ACNC: 41 IU/ML
SPECIMEN EXP DATE BLD: NORMAL
SPECIMEN SOURCE: NORMAL
SPECIMEN SOURCE: NORMAL
T PALLIDUM IGG+IGM SER QL: NEGATIVE

## 2018-04-28 LAB
BACTERIA SPEC CULT: NO GROWTH
Lab: NORMAL
SPECIMEN SOURCE: NORMAL

## 2018-05-07 ENCOUNTER — PRENATAL OFFICE VISIT (OUTPATIENT)
Dept: OBGYN | Facility: CLINIC | Age: 28
End: 2018-05-07
Payer: COMMERCIAL

## 2018-05-07 VITALS
HEART RATE: 101 BPM | DIASTOLIC BLOOD PRESSURE: 74 MMHG | RESPIRATION RATE: 16 BRPM | WEIGHT: 217.8 LBS | BODY MASS INDEX: 35 KG/M2 | HEIGHT: 66 IN | TEMPERATURE: 99.2 F | SYSTOLIC BLOOD PRESSURE: 135 MMHG

## 2018-05-07 DIAGNOSIS — Z34.01 ENCOUNTER FOR PRENATAL CARE IN FIRST TRIMESTER OF FIRST PREGNANCY: Primary | ICD-10-CM

## 2018-05-07 PROCEDURE — 76817 TRANSVAGINAL US OBSTETRIC: CPT | Performed by: OBSTETRICS & GYNECOLOGY

## 2018-05-07 PROCEDURE — 99207 ZZC PRENATAL VISIT: CPT | Performed by: OBSTETRICS & GYNECOLOGY

## 2018-05-07 NOTE — PROGRESS NOTES
"CC: Here for follow up viability @ 11w0d   HPI: no bleeding or cramping.  No complaints.     PE: /74 (BP Location: Right arm, Patient Position: Chair, Cuff Size: Adult Large)  Pulse 101  Temp 99.2  F (37.3  C) (Tympanic)  Resp 16  Ht 5' 5.75\" (1.67 m)  Wt 217 lb 12.8 oz (98.8 kg)  LMP 2018  Breastfeeding? No  BMI 35.42 kg/m2   See OB flowsheet    Bedside U/S: Transvaginal ultrasound was performed.  A viable intrauterine pregnancy was seen.  CRL consistent with 10 weeks, 6 days.  Fetal heart motion was visualized.  Subchorionic bleed ~ 1.6 x 3.4 cm present.      A/P  @ 11w0d normal pregnancy    1. Routine prenatal care    RTC as scheduled     Triny Soni M.D.    "

## 2018-05-07 NOTE — MR AVS SNAPSHOT
"              After Visit Summary   5/7/2018    Jennifer Orlando    MRN: [id number]           Patient Information     Date Of Birth          1990        Visit Information        Provider Department      5/7/2018 3:30 PM Triny Soni MD [facility]        Today's Diagnoses     Encounter for prenatal care in first trimester of first pregnancy    -  1    Subchorionic hemorrhage of placenta in first trimester, single or unspecified fetus           Follow-ups after your visit        Who to contact     If you have questions or need follow up information about today's clinic visit or your schedule please contact [facility] directly at [phone].  Normal or non-critical lab and imaging results will be communicated to you by [employer], letter or phone within 4 business days after the clinic has received the results. If you do not hear from us within 7 days, please contact the clinic through [employer] or phone. If you have a critical or abnormal lab result, we will notify you by phone as soon as possible.  Submit refill requests through [employer] or call your pharmacy and they will forward the refill request to us. Please allow 3 business days for your refill to be completed.          Additional Information About Your Visit        MyChart Information     [employer] gives you secure access to your electronic health record. If you see a primary care provider, you can also send messages to your care team and make appointments. If you have questions, please call your primary care clinic.  If you do not have a primary care provider, please call [phone] and they will assist you.        Care EveryWhere ID     This is your Care EveryWhere ID. This could be used by other organizations to access your [place] medical records  [id number]        Your Vitals Were     Pulse Temperature Respirations Height Last Period Breastfeeding?    101 99.2  F (37.3  C) (Tympanic) 16 5' 5.75\" (1.67 m) 02/19/2018 No    " BMI (Body Mass Index)                   35.42 kg/m2            Blood Pressure from Last 3 Encounters:   05/07/18 135/74   04/26/18 131/84   01/29/18 130/88    Weight from Last 3 Encounters:   05/07/18 217 lb 12.8 oz (98.8 kg)   04/26/18 221 lb (100.2 kg)   01/03/18 219 lb (99.3 kg)              We Performed the Following     US OB < 14 Weeks Single        Primary Care Provider Office Phone # Fax #    Philipp Pringle [phone] [address] [facility] [address]        Equal Access to Services     [facility] CenterFIORELLA : Hadii todd ho hadaddyo Soleti, waaxda luqadaha, qaybta kaalmada adelisa, loyda rivera . So [facility] [phone].    ATENCIÓN: Si habla español, tiene a larsen disposición servicios gratuitos de asistencia lingüística. LlLakeHealth TriPoint Medical Center [phone].    We comply with applicable federal civil rights laws and [place] laws. We do not discriminate on the basis of race, color, national origin, age, disability, sex, sexual orientation, or gender identity.            Thank you!     Thank you for choosing [facility]  for your care. Our goal is always to provide you with excellent care. Hearing back from our patients is one way we can continue to improve our services. Please take a few minutes to complete the written survey that you may receive in the mail after your visit with us. Thank you!             Your Updated Medication List - Protect others around you: Learn how to safely use, store and throw away your medicines at www.disposemymeds.org.          This list is accurate as of 5/7/18  3:51 PM.  Always use your most recent med list.                   Brand Name Dispense Instructions for use Diagnosis    albuterol 108 (90 Base) MCG/ACT Inhaler    PROAIR HFA/PROVENTIL HFA/VENTOLIN HFA    1 Inhaler    Inhale 2 puffs into the lungs every 6 hours as needed for shortness of breath / dyspnea (and before exercise)        cetirizine 10 MG tablet    zyrTEC    120  tablet    Take 2 tablets (20 mg) by mouth 2 times daily    Erythema multiforme       EPINEPHrine 0.3 MG/0.3ML injection 2-pack    EPIPEN/ADRENACLICK/or ANY BX GENERIC EQUIV    0.6 mL    Inject 0.3 mLs (0.3 mg) into the muscle once as needed for anaphylaxis        prenatal multivitamin plus iron 27-0.8 MG Tabs per tablet      Take 1 tablet by mouth daily        ranitidine 150 MG tablet    ZANTAC    60 tablet    Take 1 tablet (150 mg) by mouth 2 times daily    Erythema multiforme       valACYclovir 500 MG tablet    VALTREX    30 tablet    1 tab PO daily    Erythema multiforme

## 2018-05-07 NOTE — NURSING NOTE
"Initial /74 (BP Location: Right arm, Patient Position: Chair, Cuff Size: Adult Large)  Pulse 101  Temp 99.2  F (37.3  C) (Tympanic)  Resp 16  Ht 5' 5.75\" (1.67 m)  Wt 217 lb 12.8 oz (98.8 kg)  LMP 02/19/2018  Breastfeeding? No  BMI 35.42 kg/m2 Estimated body mass index is 35.42 kg/(m^2) as calculated from the following:    Height as of this encounter: 5' 5.75\" (1.67 m).    Weight as of this encounter: 217 lb 12.8 oz (98.8 kg). .    Juliet Burkett, [facility]    "

## 2018-05-17 ENCOUNTER — PRENATAL OFFICE VISIT (OUTPATIENT)
Dept: OBGYN | Facility: CLINIC | Age: 28
End: 2018-05-17
Payer: COMMERCIAL

## 2018-05-17 VITALS
WEIGHT: 219.6 LBS | BODY MASS INDEX: 35.29 KG/M2 | RESPIRATION RATE: 16 BRPM | HEART RATE: 92 BPM | TEMPERATURE: 98.7 F | DIASTOLIC BLOOD PRESSURE: 69 MMHG | SYSTOLIC BLOOD PRESSURE: 137 MMHG | HEIGHT: 66 IN

## 2018-05-17 DIAGNOSIS — Z34.01 ENCOUNTER FOR PRENATAL CARE IN FIRST TRIMESTER OF FIRST PREGNANCY: Primary | ICD-10-CM

## 2018-05-17 PROCEDURE — 99207 ZZC PRENATAL VISIT: CPT | Performed by: OBSTETRICS & GYNECOLOGY

## 2018-05-17 NOTE — MR AVS SNAPSHOT
After Visit Summary   5/17/2018    Jennifer Orlando    MRN: [id number]           Patient Information     Date Of Birth          1990        Visit Information        Provider Department      5/17/2018 4:00 PM Kevin Barriga MD [facility]        Today's Diagnoses     Encounter for prenatal care in first trimester of first pregnancy    -  1    Subchorionic hemorrhage of placenta in first trimester, single or unspecified fetus           Follow-ups after your visit        Follow-up notes from your care team     Return in about 4 weeks (around 6/14/2018).      Who to contact     If you have questions or need follow up information about today's clinic visit or your schedule please contact [facility] directly at [phone].  Normal or non-critical lab and imaging results will be communicated to you by MyChart, letter or phone within 4 business days after the clinic has received the results. If you do not hear from us within 7 days, please contact the clinic through [employer] or phone. If you have a critical or abnormal lab result, we will notify you by phone as soon as possible.  Submit refill requests through [employer] or call your pharmacy and they will forward the refill request to us. Please allow 3 business days for your refill to be completed.          Additional Information About Your Visit        MyChart Information     [employer] gives you secure access to your electronic health record. If you see a primary care provider, you can also send messages to your care team and make appointments. If you have questions, please call your primary care clinic.  If you do not have a primary care provider, please call [phone] and they will assist you.        Care EveryWhere ID     This is your Care EveryWhere ID. This could be used by other organizations to access your [place] medical records  [id number]        Your Vitals Were     Pulse Temperature Respirations Height Last  "Period BMI (Body Mass Index)    92 98.7  F (37.1  C) 16 5' 5.75\" (1.67 m) 02/19/2018 35.71 kg/m2       Blood Pressure from Last 3 Encounters:   05/17/18 137/69   05/07/18 135/74   04/26/18 131/84    Weight from Last 3 Encounters:   05/17/18 219 lb 9.6 oz (99.6 kg)   05/07/18 217 lb 12.8 oz (98.8 kg)   04/26/18 221 lb (100.2 kg)               Primary Care Provider Office Phone # Fax #    Philipp Pringle [phone] [address] [facility] [address]        Equal Access to Services     GRISEL TOURE : Samy mcguireo Soleti, waaxda luqadaha, qaybta kaalmada adeegyada, loyda rivera . So [facility] [phone].    ATENCIÓN: Si habla español, tiene a larsen disposición servicios gratuitos de asistencia lingüística. LlOur Lady of Mercy Hospital [phone].    We comply with applicable federal civil rights laws and [place] laws. We do not discriminate on the basis of race, color, national origin, age, disability, sex, sexual orientation, or gender identity.            Thank you!     Thank you for choosing [facility]  for your care. Our goal is always to provide you with excellent care. Hearing back from our patients is one way we can continue to improve our services. Please take a few minutes to complete the written survey that you may receive in the mail after your visit with us. Thank you!             Your Updated Medication List - Protect others around you: Learn how to safely use, store and throw away your medicines at www.disposemymeds.org.          This list is accurate as of 5/17/18 11:59 PM.  Always use your most recent med list.                   Brand Name Dispense Instructions for use Diagnosis    albuterol 108 (90 Base) MCG/ACT Inhaler    PROAIR HFA/PROVENTIL HFA/VENTOLIN HFA    1 Inhaler    Inhale 2 puffs into the lungs every 6 hours as needed for shortness of breath / dyspnea (and before exercise)        cetirizine 10 MG tablet    zyrTEC    120 tablet    Take " 2 tablets (20 mg) by mouth 2 times daily    Erythema multiforme       EPINEPHrine 0.3 MG/0.3ML injection 2-pack    EPIPEN/ADRENACLICK/or ANY BX GENERIC EQUIV    0.6 mL    Inject 0.3 mLs (0.3 mg) into the muscle once as needed for anaphylaxis        prenatal multivitamin plus iron 27-0.8 MG Tabs per tablet      Take 1 tablet by mouth daily        ranitidine 150 MG tablet    ZANTAC    60 tablet    Take 1 tablet (150 mg) by mouth 2 times daily    Erythema multiforme       valACYclovir 500 MG tablet    VALTREX    30 tablet    1 tab PO daily    Erythema multiforme

## 2018-05-17 NOTE — NURSING NOTE
"Initial /69  Pulse 92  Temp 98.7  F (37.1  C)  Resp 16  Ht 5' 5.75\" (1.67 m)  Wt 219 lb 9.6 oz (99.6 kg)  LMP 02/19/2018  BMI 35.71 kg/m2 Estimated body mass index is 35.71 kg/(m^2) as calculated from the following:    Height as of this encounter: 5' 5.75\" (1.67 m).    Weight as of this encounter: 219 lb 9.6 oz (99.6 kg). .      "

## 2018-05-21 NOTE — PROGRESS NOTES
"CC: Prenatal visit    S: She is having some mild bleeding without cramping    O: /69  Pulse 92  Temp 98.7  F (37.1  C)  Resp 16  Ht 5' 5.75\" (1.67 m)  Wt 219 lb 9.6 oz (99.6 kg)  LMP 02/19/2018  BMI 35.71 kg/m2  See above table  Bedside ULTRASOUND performed and fetal activity observed  No obvious subchorionic fluid collection noted  A: IUP @ 12+3 week EGA  1. Encounter for prenatal care in first trimester of first pregnancy    2. Subchorionic hemorrhage of placenta in first trimester, single or unspecified fetus       P RTC 4 weeks  reassurance  Kevin Barriga      "

## 2018-05-31 ENCOUNTER — PRENATAL OFFICE VISIT (OUTPATIENT)
Dept: OBGYN | Facility: CLINIC | Age: 28
End: 2018-05-31
Payer: COMMERCIAL

## 2018-05-31 VITALS
SYSTOLIC BLOOD PRESSURE: 133 MMHG | HEIGHT: 66 IN | DIASTOLIC BLOOD PRESSURE: 69 MMHG | BODY MASS INDEX: 35 KG/M2 | WEIGHT: 217.8 LBS | RESPIRATION RATE: 16 BRPM | TEMPERATURE: 98.7 F | HEART RATE: 94 BPM

## 2018-05-31 DIAGNOSIS — Z34.01 ENCOUNTER FOR PRENATAL CARE IN FIRST TRIMESTER OF FIRST PREGNANCY: ICD-10-CM

## 2018-05-31 DIAGNOSIS — Z34.02 ENCOUNTER FOR PRENATAL CARE IN SECOND TRIMESTER OF FIRST PREGNANCY: Primary | ICD-10-CM

## 2018-05-31 PROCEDURE — 99207 ZZC PRENATAL VISIT: CPT | Performed by: ADVANCED PRACTICE MIDWIFE

## 2018-05-31 NOTE — MR AVS SNAPSHOT
After Visit Summary   5/31/2018    Jennifer Orlando    MRN: [id number]           Patient Information     Date Of Birth          1990        Visit Information        Provider Department      5/31/2018 2:45 PM Romelia Lutz APRN CNM [facility]        Today's Diagnoses     Encounter for prenatal care in second trimester of first pregnancy    -  1    Subchorionic hemorrhage of placenta in first trimester, single or unspecified fetus        Encounter for prenatal care in first trimester of first pregnancy           Follow-ups after your visit        Follow-up notes from your care team     Return in about 4 weeks (around 6/28/2018) for Prenatal Visit.      Who to contact     If you have questions or need follow up information about today's clinic visit or your schedule please contact [facility] directly at [phone].  Normal or non-critical lab and imaging results will be communicated to you by [employer], letter or phone within 4 business days after the clinic has received the results. If you do not hear from us within 7 days, please contact the clinic through [employer] or phone. If you have a critical or abnormal lab result, we will notify you by phone as soon as possible.  Submit refill requests through [employer] or call your pharmacy and they will forward the refill request to us. Please allow 3 business days for your refill to be completed.          Additional Information About Your Visit        MyChart Information     [employer] gives you secure access to your electronic health record. If you see a primary care provider, you can also send messages to your care team and make appointments. If you have questions, please call your primary care clinic.  If you do not have a primary care provider, please call [phone] and they will assist you.        Care EveryWhere ID     This is your Care EveryWhere ID. This could be used by other organizations to access your [place]  "medical records  [id number]        Your Vitals Were     Pulse Temperature Respirations Height Last Period BMI (Body Mass Index)    94 98.7  F (37.1  C) (Tympanic) 16 5' 5.75\" (1.67 m) 02/19/2018 35.42 kg/m2       Blood Pressure from Last 3 Encounters:   05/31/18 133/69   05/17/18 137/69   05/07/18 135/74    Weight from Last 3 Encounters:   05/31/18 217 lb 12.8 oz (98.8 kg)   05/17/18 219 lb 9.6 oz (99.6 kg)   05/07/18 217 lb 12.8 oz (98.8 kg)              We Performed the Following     US OB Ltd One Or More Fetus FU/Repeat        Primary Care Provider Office Phone # Fax #    Philipp Pringle [phone] [address] [facility] [address]        Equal Access to Services     [facility]A : Hadii aad ku hadasho Soleti, waaxda luqadaha, qaybta kaalmada adeegyada, loyda rivera . So [facility] [phone].    ATENCIÓN: Si cresencio heck, tiene a larsen disposición servicios gratuitos de asistencia lingüística. Llame al [phone].    We comply with applicable federal civil rights laws and [place] laws. We do not discriminate on the basis of race, color, national origin, age, disability, sex, sexual orientation, or gender identity.            Thank you!     Thank you for choosing [facility]  for your care. Our goal is always to provide you with excellent care. Hearing back from our patients is one way we can continue to improve our services. Please take a few minutes to complete the written survey that you may receive in the mail after your visit with us. Thank you!             Your Updated Medication List - Protect others around you: Learn how to safely use, store and throw away your medicines at www.disposemymeds.org.          This list is accurate as of 5/31/18  3:17 PM.  Always use your most recent med list.                   Brand Name Dispense Instructions for use Diagnosis    albuterol 108 (90 Base) MCG/ACT Inhaler    PROAIR HFA/PROVENTIL " HFA/VENTOLIN HFA    1 Inhaler    Inhale 2 puffs into the lungs every 6 hours as needed for shortness of breath / dyspnea (and before exercise)        cetirizine 10 MG tablet    zyrTEC    120 tablet    Take 2 tablets (20 mg) by mouth 2 times daily    Erythema multiforme       EPINEPHrine 0.3 MG/0.3ML injection 2-pack    EPIPEN/ADRENACLICK/or ANY BX GENERIC EQUIV    0.6 mL    Inject 0.3 mLs (0.3 mg) into the muscle once as needed for anaphylaxis        prenatal multivitamin plus iron 27-0.8 MG Tabs per tablet      Take 1 tablet by mouth daily        ranitidine 150 MG tablet    ZANTAC    60 tablet    Take 1 tablet (150 mg) by mouth 2 times daily    Erythema multiforme       valACYclovir 500 MG tablet    VALTREX    30 tablet    1 tab PO daily    Erythema multiforme

## 2018-05-31 NOTE — PROGRESS NOTES
"S:Feels well. She had a subchorionic hemorrhage noted on her first U/S. She reports having some light bleeding early in pregnancy, but no bleeding for two weeks. She is very nervous as she has had an SAB in the past. She is requesting an ultrasound today.   Fetal movement No  Denies loss of fluid/vb/contractions/pelvic pain  Depression screening done  O:  /69 (BP Location: Right arm, Patient Position: Chair, Cuff Size: Adult Large)  Pulse 94  Temp 98.7  F (37.1  C) (Tympanic)  Resp 16  Ht 5' 5.75\" (1.67 m)  Wt 217 lb 12.8 oz (98.8 kg)  LMP 02/19/2018  BMI 35.42 kg/m2  Exam:  Constitutional: healthy, alert and no distress  Respiratory: Respirations even and unlabored  Gastrointestinal: Abdomen soft, non-tender. Fundus measures appropriately for gestational age. Fetal heart tones heard easily.  Psychiatric: mentation appears normal and affect normal/bright  A: (Z34.02) Encounter for prenatal care in second trimester of first pregnancy  (primary encounter diagnosis)    (O41.8X10,  O46.8X1) Subchorionic hemorrhage of placenta in first trimester, single or unspecified fetus    (Z34.01) Encounter for prenatal care in first trimester of first pregnancy      P: Discussed options for genetics testing today. Reviewed differences between Progenity testing and quad screen. Patient will discuss with her  and have a decision by next visit.   Follow up U/S for subchorionic hemorrhage ordered.   Anatomy ultrasound next visit between 20-22 weeks  Return to clinic 4 weeks    Romelia Lutz CNM      "

## 2018-05-31 NOTE — NURSING NOTE
"Chief Complaint   Patient presents with     Prenatal Care     was wondering if the subchorionic hemorrhage has went down        Initial /69 (BP Location: Right arm, Patient Position: Chair, Cuff Size: Adult Large)  Pulse 94  Temp 98.7  F (37.1  C) (Tympanic)  Resp 16  Ht 5' 5.75\" (1.67 m)  Wt 217 lb 12.8 oz (98.8 kg)  LMP 02/19/2018  BMI 35.42 kg/m2 Estimated body mass index is 35.42 kg/(m^2) as calculated from the following:    Height as of this encounter: 5' 5.75\" (1.67 m).    Weight as of this encounter: 217 lb 12.8 oz (98.8 kg).  Medications and allergies reviewed.    Tuan ACKERMAN CMA    "

## 2018-06-11 ENCOUNTER — TELEPHONE (OUTPATIENT)
Dept: OBGYN | Facility: CLINIC | Age: 28
End: 2018-06-11

## 2018-06-11 NOTE — TELEPHONE ENCOUNTER
Pt called stating that she is approxmately 15 weeks pregnant and has been seen by Dr. Soni for a bruise on her uterus. According to pt Dr. Soni had told her that she can come in approximately every 2 weeks to get the bruising checked and to see if it has gone down. Pt states that she does not believe that the bruise was measured the past couple of times so is unsure if bruising is going away. Pt will need another appointment. Please advise.    Saray Todd  [facility]

## 2018-06-11 NOTE — TELEPHONE ENCOUNTER
Pt was advised that an order was placed by Romelia Lutz CNM for repeat US and she can call [facility] to schedule this, she also needs to schedule her next prenatal ob visit.  Pt transferred to scheduling to schedule this visit.    Edilma Radford  [facility] RN

## 2018-06-12 ENCOUNTER — HOSPITAL ENCOUNTER (OUTPATIENT)
Dept: ULTRASOUND IMAGING | Facility: CLINIC | Age: 28
Discharge: HOME | End: 2018-06-12
Attending: OBSTETRICS & GYNECOLOGY | Admitting: OBSTETRICS & GYNECOLOGY
Payer: COMMERCIAL

## 2018-06-12 PROCEDURE — 76816 OB US FOLLOW-UP PER FETUS: CPT

## 2018-06-14 NOTE — PROGRESS NOTES
There appears to be improvement in the clot area as it is getting smaller.  Baby's heart rate is good.  Kevin Barriga

## 2018-06-21 ENCOUNTER — PRENATAL OFFICE VISIT (OUTPATIENT)
Dept: OBGYN | Facility: CLINIC | Age: 28
End: 2018-06-21
Payer: COMMERCIAL

## 2018-06-21 VITALS
WEIGHT: 220.4 LBS | TEMPERATURE: 98.4 F | HEIGHT: 66 IN | RESPIRATION RATE: 16 BRPM | DIASTOLIC BLOOD PRESSURE: 67 MMHG | SYSTOLIC BLOOD PRESSURE: 139 MMHG | BODY MASS INDEX: 35.42 KG/M2 | HEART RATE: 90 BPM

## 2018-06-21 DIAGNOSIS — Z34.02 ENCOUNTER FOR PRENATAL CARE IN SECOND TRIMESTER OF FIRST PREGNANCY: Primary | ICD-10-CM

## 2018-06-21 PROCEDURE — 99207 ZZC PRENATAL VISIT: CPT | Performed by: OBSTETRICS & GYNECOLOGY

## 2018-06-21 NOTE — NURSING NOTE
"Chief Complaint   Patient presents with     Prenatal Care     go over 5/31/18 US results       Initial /67 (BP Location: Right arm, Patient Position: Chair, Cuff Size: Adult Large)  Pulse 90  Temp 98.4  F (36.9  C) (Tympanic)  Resp 16  Ht 5' 5.75\" (1.67 m)  Wt 220 lb 6.4 oz (100 kg)  LMP 02/19/2018  BMI 35.84 kg/m2 Estimated body mass index is 35.84 kg/(m^2) as calculated from the following:    Height as of this encounter: 5' 5.75\" (1.67 m).    Weight as of this encounter: 220 lb 6.4 oz (100 kg).  Medications and allergies reviewed.    Tuan ACKERMAN, MARY    "

## 2018-06-21 NOTE — MR AVS SNAPSHOT
After Visit Summary   6/21/2018    Jennifer Orlando    MRN: [id number]           Patient Information     Date Of Birth          1990        Visit Information        Provider Department      6/21/2018 3:00 PM Esther Swanson MD [facility]        Today's Diagnoses     Encounter for prenatal care in second trimester of first pregnancy    -  1       Follow-ups after your visit        Follow-up notes from your care team     Return in about 4 weeks (around 7/19/2018).      Your next 10 appointments already scheduled     Jul 11, 2018 10:30 AM CDT   (Arrive by 10:15 AM)   US OB > 14 WEEKS COMPLETE SINGLE with WYUS1   [facility] Ultrasound ([facility])    [address] [address] [phone]           Please bring a list of your medicines (including vitamins, minerals and over-the-counter drugs). Also, tell your doctor about any allergies you may have. Wear comfortable clothes and leave your valuables at home.  If you re less than 20 weeks drink four 8-ounce glasses of fluid an hour before your exam. If you need to empty your bladder before your exam, try to release only a little urine. Then, drink another glass of fluid.  You may have up to two family members in the exam room. If you bring a small child, an adult must be there to care for him or her.  Please call the Imaging Department at your exam site with any questions.              Future tests that were ordered for you today     Open Future Orders        Priority Expected Expires Ordered    US OB > 14 Weeks Complete Single Routine 6/21/2018 8/21/2018 6/21/2018            Who to contact     If you have questions or need follow up information about today's clinic visit or your schedule please contact [facility] directly at [phone].  Normal or non-critical lab and imaging results will be communicated to you by MyChart, letter or phone within 4 business days after the clinic  "has received the results. If you do not hear from us within 7 days, please contact the clinic through [employer] or phone. If you have a critical or abnormal lab result, we will notify you by phone as soon as possible.  Submit refill requests through [employer] or call your pharmacy and they will forward the refill request to us. Please allow 3 business days for your refill to be completed.          Additional Information About Your Visit        [employer] Information     [employer] gives you secure access to your electronic health record. If you see a primary care provider, you can also send messages to your care team and make appointments. If you have questions, please call your primary care clinic.  If you do not have a primary care provider, please call [phone] and they will assist you.        Care EveryWhere ID     This is your Care EveryWhere ID. This could be used by other organizations to access your [place] medical records  [id number]        Your Vitals Were     Pulse Temperature Respirations Height Last Period BMI (Body Mass Index)    90 98.4  F (36.9  C) (Tympanic) 16 5' 5.75\" (1.67 m) 02/19/2018 35.84 kg/m2       Blood Pressure from Last 3 Encounters:   06/21/18 139/67   05/31/18 133/69   05/17/18 137/69    Weight from Last 3 Encounters:   06/21/18 220 lb 6.4 oz (100 kg)   05/31/18 217 lb 12.8 oz (98.8 kg)   05/17/18 219 lb 9.6 oz (99.6 kg)               Primary Care Provider Office Phone # Fax #    Philipp Pringle [phone] [address] [facility] [address]        Equal Access to Services     [facility] : Hadzonia Vasques, orcio fajardo, loyda guy. So [facility] [phone].    ATENCIÓN: Si habla español, tiene a larsen disposición servicios gratuitos de asistencia lingüística. [place] al [phone].    We comply with applicable federal civil rights laws and [place] laws. We do not discriminate on the basis " of race, color, national origin, age, disability, sex, sexual orientation, or gender identity.            Thank you!     Thank you for choosing [facility]  for your care. Our goal is always to provide you with excellent care. Hearing back from our patients is one way we can continue to improve our services. Please take a few minutes to complete the written survey that you may receive in the mail after your visit with us. Thank you!             Your Updated Medication List - Protect others around you: Learn how to safely use, store and throw away your medicines at www.disposemymeds.org.          This list is accurate as of 6/21/18  7:40 PM.  Always use your most recent med list.                   Brand Name Dispense Instructions for use Diagnosis    albuterol 108 (90 Base) MCG/ACT Inhaler    PROAIR HFA/PROVENTIL HFA/VENTOLIN HFA    1 Inhaler    Inhale 2 puffs into the lungs every 6 hours as needed for shortness of breath / dyspnea (and before exercise)        cetirizine 10 MG tablet    zyrTEC    120 tablet    Take 2 tablets (20 mg) by mouth 2 times daily    Erythema multiforme       EPINEPHrine 0.3 MG/0.3ML injection 2-pack    EPIPEN/ADRENACLICK/or ANY BX GENERIC EQUIV    0.6 mL    Inject 0.3 mLs (0.3 mg) into the muscle once as needed for anaphylaxis        prenatal multivitamin plus iron 27-0.8 MG Tabs per tablet      Take 1 tablet by mouth daily        ranitidine 150 MG tablet    ZANTAC    60 tablet    Take 1 tablet (150 mg) by mouth 2 times daily    Erythema multiforme       valACYclovir 500 MG tablet    VALTREX    30 tablet    1 tab PO daily    Erythema multiforme

## 2018-06-22 NOTE — PROGRESS NOTES
Doing well.  +FM maybe just tiny flutter here and there, although she has been told she has an anterior placenta and probably won't feel it soon.  No bleeding.    Unable to auscultate on doptones  BSUS: + bpm, grossly normal movement and fluid    28 year old  at 17w3d   - anatomy ultrasound ordered  - discussed quickening    RTC 4 weeks    Esther Swanson MD, MPH  [facility]

## 2018-07-11 ENCOUNTER — HOSPITAL ENCOUNTER (OUTPATIENT)
Dept: ULTRASOUND IMAGING | Facility: CLINIC | Age: 28
Discharge: HOME | End: 2018-07-11
Attending: OBSTETRICS & GYNECOLOGY | Admitting: OBSTETRICS & GYNECOLOGY
Payer: COMMERCIAL

## 2018-07-11 ENCOUNTER — PRENATAL OFFICE VISIT (OUTPATIENT)
Dept: OBGYN | Facility: CLINIC | Age: 28
End: 2018-07-11
Payer: COMMERCIAL

## 2018-07-11 VITALS
HEART RATE: 83 BPM | DIASTOLIC BLOOD PRESSURE: 65 MMHG | BODY MASS INDEX: 35.9 KG/M2 | RESPIRATION RATE: 16 BRPM | HEIGHT: 66 IN | SYSTOLIC BLOOD PRESSURE: 127 MMHG | TEMPERATURE: 98 F | WEIGHT: 223.4 LBS

## 2018-07-11 DIAGNOSIS — Z34.02 ENCOUNTER FOR PRENATAL CARE IN SECOND TRIMESTER OF FIRST PREGNANCY: ICD-10-CM

## 2018-07-11 DIAGNOSIS — Z34.82 ENCOUNTER FOR SUPERVISION OF OTHER NORMAL PREGNANCY IN SECOND TRIMESTER: Primary | ICD-10-CM

## 2018-07-11 PROCEDURE — 76805 OB US >/= 14 WKS SNGL FETUS: CPT

## 2018-07-11 PROCEDURE — 99207 ZZC PRENATAL VISIT: CPT | Performed by: OBSTETRICS & GYNECOLOGY

## 2018-07-11 NOTE — MR AVS SNAPSHOT
After Visit Summary   7/11/2018    Jennifer Orlando    MRN: [id number]           Patient Information     Date Of Birth          1990        Visit Information        Provider Department      7/11/2018 11:30 AM Chanel Watson MD [facility]        Today's Diagnoses     Encounter for supervision of other normal pregnancy in second trimester    -  1       Follow-ups after your visit        Future tests that were ordered for you today     Open Future Orders        Priority Expected Expires Ordered    Glucose tolerance gest screen 1 hour Routine  10/11/2018 7/11/2018    CBC with platelets Routine  10/11/2018 7/11/2018    Treponema Abs w Reflex to RPR and Titer Routine  10/11/2018 7/11/2018            Who to contact     If you have questions or need follow up information about today's clinic visit or your schedule please contact [facility] directly at [phone].  Normal or non-critical lab and imaging results will be communicated to you by MyChart, letter or phone within 4 business days after the clinic has received the results. If you do not hear from us within 7 days, please contact the clinic through [employer] or phone. If you have a critical or abnormal lab result, we will notify you by phone as soon as possible.  Submit refill requests through [employer] or call your pharmacy and they will forward the refill request to us. Please allow 3 business days for your refill to be completed.          Additional Information About Your Visit        MyChart Information     [employer] gives you secure access to your electronic health record. If you see a primary care provider, you can also send messages to your care team and make appointments. If you have questions, please call your primary care clinic.  If you do not have a primary care provider, please call [phone] and they will assist you.        Care EveryWhere ID     This is your Care EveryWhere ID. This could be  "used by other organizations to access your [place] medical records  [id number]        Your Vitals Were     Pulse Temperature Respirations Height Last Period BMI (Body Mass Index)    83 98  F (36.7  C) (Tympanic) 16 5' 5.75\" (1.67 m) 02/19/2018 36.33 kg/m2       Blood Pressure from Last 3 Encounters:   07/11/18 127/65   06/21/18 139/67   05/31/18 133/69    Weight from Last 3 Encounters:   07/11/18 223 lb 6.4 oz (101.3 kg)   06/21/18 220 lb 6.4 oz (100 kg)   05/31/18 217 lb 12.8 oz (98.8 kg)               Primary Care Provider Office Phone # Fax #    Philipp Pringle [phone] [address] [facility] [address]        Equal Access to Services     [facility]: Hadii todd ho hadasho Soleti, waaxda luqadaha, qaybta kaalmada adeegyada, loyda em haygrant rivera . So [facility] [phone].    ATENCIÓN: Si habla español, tiene a larsen disposición servicios gratuitos de asistencia lingüística. Llame al [phone].    We comply with applicable federal civil rights laws and [place] laws. We do not discriminate on the basis of race, color, national origin, age, disability, sex, sexual orientation, or gender identity.            Thank you!     Thank you for choosing [facility]  for your care. Our goal is always to provide you with excellent care. Hearing back from our patients is one way we can continue to improve our services. Please take a few minutes to complete the written survey that you may receive in the mail after your visit with us. Thank you!             Your Updated Medication List - Protect others around you: Learn how to safely use, store and throw away your medicines at www.disposemymeds.org.          This list is accurate as of 7/11/18 11:37 AM.  Always use your most recent med list.                   Brand Name Dispense Instructions for use Diagnosis    albuterol 108 (90 Base) MCG/ACT Inhaler    PROAIR HFA/PROVENTIL HFA/VENTOLIN HFA    1 Inhaler    " Inhale 2 puffs into the lungs every 6 hours as needed for shortness of breath / dyspnea (and before exercise)        cetirizine 10 MG tablet    zyrTEC    120 tablet    Take 2 tablets (20 mg) by mouth 2 times daily    Erythema multiforme       EPINEPHrine 0.3 MG/0.3ML injection 2-pack    EPIPEN/ADRENACLICK/or ANY BX GENERIC EQUIV    0.6 mL    Inject 0.3 mLs (0.3 mg) into the muscle once as needed for anaphylaxis        prenatal multivitamin plus iron 27-0.8 MG Tabs per tablet      Take 1 tablet by mouth daily        ranitidine 150 MG tablet    ZANTAC    60 tablet    Take 1 tablet (150 mg) by mouth 2 times daily    Erythema multiforme       valACYclovir 500 MG tablet    VALTREX    30 tablet    1 tab PO daily    Erythema multiforme

## 2018-07-11 NOTE — PROGRESS NOTES
CC: prenatal  S: no complaints. No lof/vb/dc.  Maybe starting to feel something.  LMP 02/19/2018  US reviewed initial -normal-it's a girl  A/P routine precautions  F/u 4 weeks  Chanel Watson MD

## 2018-08-08 ENCOUNTER — PRENATAL OFFICE VISIT (OUTPATIENT)
Dept: OBGYN | Facility: CLINIC | Age: 28
End: 2018-08-08
Payer: COMMERCIAL

## 2018-08-08 VITALS
HEIGHT: 66 IN | WEIGHT: 230.8 LBS | SYSTOLIC BLOOD PRESSURE: 139 MMHG | TEMPERATURE: 99.3 F | BODY MASS INDEX: 37.09 KG/M2 | HEART RATE: 93 BPM | DIASTOLIC BLOOD PRESSURE: 76 MMHG | RESPIRATION RATE: 16 BRPM

## 2018-08-08 DIAGNOSIS — R03.0 ELEVATED BLOOD PRESSURE READING WITHOUT DIAGNOSIS OF HYPERTENSION: ICD-10-CM

## 2018-08-08 DIAGNOSIS — Z34.02 ENCOUNTER FOR PRENATAL CARE IN SECOND TRIMESTER OF FIRST PREGNANCY: Primary | ICD-10-CM

## 2018-08-08 LAB
ALT SERPL W P-5'-P-CCNC: 18 U/L (ref 0–50)
AST SERPL W P-5'-P-CCNC: 15 U/L (ref 0–45)
CREAT SERPL-MCNC: 0.62 MG/DL (ref 0.52–1.04)
CREAT UR-MCNC: 81 MG/DL
ERYTHROCYTE [DISTWIDTH] IN BLOOD BY AUTOMATED COUNT: 13.6 % (ref 10–15)
GFR SERPL CREATININE-BSD FRML MDRD: >90 ML/MIN/1.7M2
GLUCOSE 1H P 50 G GLC PO SERPL-MCNC: 82 MG/DL (ref 60–129)
HCT VFR BLD AUTO: 34.6 % (ref 35–47)
HGB BLD-MCNC: 11.5 G/DL (ref 11.7–15.7)
MCH RBC QN AUTO: 28.3 PG (ref 26.5–33)
MCHC RBC AUTO-ENTMCNC: 33.2 G/DL (ref 31.5–36.5)
MCV RBC AUTO: 85 FL (ref 78–100)
PLATELET # BLD AUTO: 215 10E9/L (ref 150–450)
PROT UR-MCNC: 0.18 G/L
PROT/CREAT 24H UR: 0.23 G/G CR (ref 0–0.2)
RBC # BLD AUTO: 4.06 10E12/L (ref 3.8–5.2)
URATE SERPL-MCNC: 5.7 MG/DL (ref 2.6–6)
WBC # BLD AUTO: 10.8 10E9/L (ref 4–11)

## 2018-08-08 PROCEDURE — 84450 TRANSFERASE (AST) (SGOT): CPT | Performed by: OBSTETRICS & GYNECOLOGY

## 2018-08-08 PROCEDURE — 99207 ZZC PRENATAL VISIT: CPT | Performed by: OBSTETRICS & GYNECOLOGY

## 2018-08-08 PROCEDURE — 82565 ASSAY OF CREATININE: CPT | Performed by: OBSTETRICS & GYNECOLOGY

## 2018-08-08 PROCEDURE — 84156 ASSAY OF PROTEIN URINE: CPT | Performed by: OBSTETRICS & GYNECOLOGY

## 2018-08-08 PROCEDURE — 85027 COMPLETE CBC AUTOMATED: CPT | Performed by: OBSTETRICS & GYNECOLOGY

## 2018-08-08 PROCEDURE — 36415 COLL VENOUS BLD VENIPUNCTURE: CPT | Performed by: OBSTETRICS & GYNECOLOGY

## 2018-08-08 PROCEDURE — 82950 GLUCOSE TEST: CPT | Performed by: OBSTETRICS & GYNECOLOGY

## 2018-08-08 PROCEDURE — 86780 TREPONEMA PALLIDUM: CPT | Performed by: OBSTETRICS & GYNECOLOGY

## 2018-08-08 PROCEDURE — 84460 ALANINE AMINO (ALT) (SGPT): CPT | Performed by: OBSTETRICS & GYNECOLOGY

## 2018-08-08 PROCEDURE — 84550 ASSAY OF BLOOD/URIC ACID: CPT | Performed by: OBSTETRICS & GYNECOLOGY

## 2018-08-08 NOTE — NURSING NOTE
"Initial /76 (BP Location: Right arm, Patient Position: Chair, Cuff Size: Adult Large)  Pulse 93  Temp 99.3  F (37.4  C) (Tympanic)  Resp 16  Ht 5' 5.75\" (1.67 m)  Wt 230 lb 12.8 oz (104.7 kg)  LMP 02/19/2018  BMI 37.54 kg/m2 Estimated body mass index is 37.54 kg/(m^2) as calculated from the following:    Height as of this encounter: 5' 5.75\" (1.67 m).    Weight as of this encounter: 230 lb 12.8 oz (104.7 kg). .      "

## 2018-08-08 NOTE — PROGRESS NOTES
Jennifer  Your results are normal.  If you have any other questions or concerns, please followup in the office or contact us on mychart or evisit.    Chanel Watson

## 2018-08-08 NOTE — MR AVS SNAPSHOT
After Visit Summary   8/8/2018    Jennifer Orlando    MRN: [id number]           Patient Information     Date Of Birth          1990        Visit Information        Provider Department      8/8/2018 1:00 PM Chanel Watson MD [facility]        Today's Diagnoses     Encounter for prenatal care in second trimester of first pregnancy    -  1    Elevated blood pressure reading without diagnosis of hypertension           Follow-ups after your visit        Future tests that were ordered for you today     Open Standing Orders        Priority Remaining Interval Expires Ordered    US OB Ltd One Or More Fetus FU/Repeat Routine 4/4 every 4 weeks 8/8/2019 8/8/2018            Who to contact     If you have questions or need follow up information about today's clinic visit or your schedule please contact [facility] directly at [phone].  Normal or non-critical lab and imaging results will be communicated to you by MyChart, letter or phone within 4 business days after the clinic has received the results. If you do not hear from us within 7 days, please contact the clinic through [employer] or phone. If you have a critical or abnormal lab result, we will notify you by phone as soon as possible.  Submit refill requests through [employer] or call your pharmacy and they will forward the refill request to us. Please allow 3 business days for your refill to be completed.          Additional Information About Your Visit        MyChart Information     [employer] gives you secure access to your electronic health record. If you see a primary care provider, you can also send messages to your care team and make appointments. If you have questions, please call your primary care clinic.  If you do not have a primary care provider, please call [phone] and they will assist you.        Care EveryWhere ID     This is your Care EveryWhere ID. This could be used by other organizations to  "access your [place] medical records  [id number]        Your Vitals Were     Pulse Temperature Respirations Height Last Period BMI (Body Mass Index)    93 99.3  F (37.4  C) (Tympanic) 16 5' 5.75\" (1.67 m) 02/19/2018 37.54 kg/m2       Blood Pressure from Last 3 Encounters:   08/08/18 139/76   07/11/18 127/65   06/21/18 139/67    Weight from Last 3 Encounters:   08/08/18 230 lb 12.8 oz (104.7 kg)   07/11/18 223 lb 6.4 oz (101.3 kg)   06/21/18 220 lb 6.4 oz (100 kg)               Primary Care Provider Office Phone # Fax #    Philipp Pringle [phone] [address] [facility] [address]        Equal Access to Services     [facility]: Hadii todd ho hadasho Soomaali, waaxda luqadaha, qaybta kaalmada adeegyada, loyda rivera . So [facility] [phone].    ATENCIÓN: Si habla español, tiene a larsen disposición servicios gratuitos de asistencia lingüística. Llame al [phone].    We comply with applicable federal civil rights laws and [place] laws. We do not discriminate on the basis of race, color, national origin, age, disability, sex, sexual orientation, or gender identity.            Thank you!     Thank you for choosing [facility]  for your care. Our goal is always to provide you with excellent care. Hearing back from our patients is one way we can continue to improve our services. Please take a few minutes to complete the written survey that you may receive in the mail after your visit with us. Thank you!             Your Updated Medication List - Protect others around you: Learn how to safely use, store and throw away your medicines at www.disposemymeds.org.          This list is accurate as of 8/8/18  1:32 PM.  Always use your most recent med list.                   Brand Name Dispense Instructions for use Diagnosis    albuterol 108 (90 Base) MCG/ACT Inhaler    PROAIR HFA/PROVENTIL HFA/VENTOLIN HFA    1 Inhaler    Inhale 2 puffs into the lungs " every 6 hours as needed for shortness of breath / dyspnea (and before exercise)        cetirizine 10 MG tablet    zyrTEC    120 tablet    Take 2 tablets (20 mg) by mouth 2 times daily    Erythema multiforme       EPINEPHrine 0.3 MG/0.3ML injection 2-pack    EPIPEN/ADRENACLICK/or ANY BX GENERIC EQUIV    0.6 mL    Inject 0.3 mLs (0.3 mg) into the muscle once as needed for anaphylaxis        prenatal multivitamin plus iron 27-0.8 MG Tabs per tablet      Take 1 tablet by mouth daily        ranitidine 150 MG tablet    ZANTAC    60 tablet    Take 1 tablet (150 mg) by mouth 2 times daily    Erythema multiforme       valACYclovir 500 MG tablet    VALTREX    30 tablet    1 tab PO daily    Erythema multiforme

## 2018-08-08 NOTE — PROGRESS NOTES
"CC: prenatal  S: no complaints.  No ctx/lof/vb/dc.  Good fm.  Had borderline hypertension prior to her pregnancy never treated with blood pressure agents.  /76 (BP Location: Right arm, Patient Position: Chair, Cuff Size: Adult Large)  Pulse 93  Temp 99.3  F (37.4  C) (Tympanic)  Resp 16  Ht 5' 5.75\" (1.67 m)  Wt 230 lb 12.8 oz (104.7 kg)  LMP 02/19/2018  BMI 37.54 kg/m2   A/P labs today, add baseline PIH labs due her history, due to being borderline hypertension will hold off on baby ASA daily.    Routine precautions  F/u 4 weeks  tdap discussed  Chanel Watson MD      "

## 2018-08-09 LAB — T PALLIDUM AB SER QL: NONREACTIVE

## 2018-09-05 ENCOUNTER — HOSPITAL ENCOUNTER (OUTPATIENT)
Dept: ULTRASOUND IMAGING | Facility: CLINIC | Age: 28
Discharge: HOME | End: 2018-09-05
Attending: OBSTETRICS & GYNECOLOGY | Admitting: OBSTETRICS & GYNECOLOGY
Payer: COMMERCIAL

## 2018-09-05 DIAGNOSIS — R03.0 ELEVATED BLOOD PRESSURE READING WITHOUT DIAGNOSIS OF HYPERTENSION: ICD-10-CM

## 2018-09-05 DIAGNOSIS — Z34.02 ENCOUNTER FOR PRENATAL CARE IN SECOND TRIMESTER OF FIRST PREGNANCY: ICD-10-CM

## 2018-09-05 PROCEDURE — 76816 OB US FOLLOW-UP PER FETUS: CPT

## 2018-09-05 NOTE — PROGRESS NOTES
Jennifre  Your results are normal.  If you have any other questions or concerns, please followup in the office or contact us on mychart or evisit.    Chanel Watson

## 2018-09-07 ENCOUNTER — PRENATAL OFFICE VISIT (OUTPATIENT)
Dept: OBGYN | Facility: CLINIC | Age: 28
End: 2018-09-07
Payer: COMMERCIAL

## 2018-09-07 VITALS
HEART RATE: 92 BPM | TEMPERATURE: 98.4 F | HEIGHT: 66 IN | WEIGHT: 229.4 LBS | BODY MASS INDEX: 36.87 KG/M2 | DIASTOLIC BLOOD PRESSURE: 62 MMHG | SYSTOLIC BLOOD PRESSURE: 126 MMHG | RESPIRATION RATE: 16 BRPM

## 2018-09-07 DIAGNOSIS — Z34.03 ENCOUNTER FOR SUPERVISION OF NORMAL FIRST PREGNANCY IN THIRD TRIMESTER: Primary | ICD-10-CM

## 2018-09-07 PROCEDURE — 90471 IMMUNIZATION ADMIN: CPT | Performed by: OBSTETRICS & GYNECOLOGY

## 2018-09-07 PROCEDURE — 90715 TDAP VACCINE 7 YRS/> IM: CPT | Performed by: OBSTETRICS & GYNECOLOGY

## 2018-09-07 PROCEDURE — 99207 ZZC PRENATAL VISIT: CPT | Performed by: OBSTETRICS & GYNECOLOGY

## 2018-09-07 NOTE — MR AVS SNAPSHOT
"              After Visit Summary   9/7/2018    Jennifer Orlando    MRN: [id number]           Patient Information     Date Of Birth          1990        Visit Information        Provider Department      9/7/2018 11:00 AM Chanel Watson MD [facility]        Today's Diagnoses     Encounter for supervision of normal first pregnancy in third trimester    -  1       Follow-ups after your visit        Who to contact     If you have questions or need follow up information about today's clinic visit or your schedule please contact [facility] directly at [phone].  Normal or non-critical lab and imaging results will be communicated to you by MyChart, letter or phone within 4 business days after the clinic has received the results. If you do not hear from us within 7 days, please contact the clinic through [employer] or phone. If you have a critical or abnormal lab result, we will notify you by phone as soon as possible.  Submit refill requests through [employer] or call your pharmacy and they will forward the refill request to us. Please allow 3 business days for your refill to be completed.          Additional Information About Your Visit        MyChart Information     [employer] gives you secure access to your electronic health record. If you see a primary care provider, you can also send messages to your care team and make appointments. If you have questions, please call your primary care clinic.  If you do not have a primary care provider, please call [phone] and they will assist you.        Care EveryWhere ID     This is your Care EveryWhere ID. This could be used by other organizations to access your [place] medical records  [id number]        Your Vitals Were     Pulse Temperature Respirations Height Last Period BMI (Body Mass Index)    92 98.4  F (36.9  C) (Tympanic) 16 5' 5.75\" (1.67 m) 02/19/2018 37.31 kg/m2       Blood Pressure from Last 3 Encounters:   09/07/18 " 126/62   08/08/18 139/76   07/11/18 127/65    Weight from Last 3 Encounters:   09/07/18 229 lb 6.4 oz (104.1 kg)   08/08/18 230 lb 12.8 oz (104.7 kg)   07/11/18 223 lb 6.4 oz (101.3 kg)              We Performed the Following     TDAP VACCINE (ADACEL)     VACCINE ADMINISTRATION, INITIAL        Primary Care Provider Office Phone # Fax #    Philipp Pringle [phone] [address] [facility] [address]        Equal Access to Services     [facility]: Hadii todd ho hadashtonny Soleti, waaxda luqadaha, qaybta kaalmada angelica, loyda rivera . So [facility] [phone].    ATENCIÓN: Si habla español, tiene a larsen disposición servicios gratuitos de asistencia lingüística. [facility] [phone].    We comply with applicable federal civil rights laws and [place] laws. We do not discriminate on the basis of race, color, national origin, age, disability, sex, sexual orientation, or gender identity.            Thank you!     Thank you for choosing [facility]  for your care. Our goal is always to provide you with excellent care. Hearing back from our patients is one way we can continue to improve our services. Please take a few minutes to complete the written survey that you may receive in the mail after your visit with us. Thank you!             Your Updated Medication List - Protect others around you: Learn how to safely use, store and throw away your medicines at www.disposemymeds.org.          This list is accurate as of 9/7/18 11:21 AM.  Always use your most recent med list.                   Brand Name Dispense Instructions for use Diagnosis    albuterol 108 (90 Base) MCG/ACT inhaler    PROAIR HFA/PROVENTIL HFA/VENTOLIN HFA    1 Inhaler    Inhale 2 puffs into the lungs every 6 hours as needed for shortness of breath / dyspnea (and before exercise)        cetirizine 10 MG tablet    zyrTEC    120 tablet    Take 2 tablets (20 mg) by mouth 2 times daily     Erythema multiforme       EPINEPHrine 0.3 MG/0.3ML injection 2-pack    EPIPEN/ADRENACLICK/or ANY BX GENERIC EQUIV    0.6 mL    Inject 0.3 mLs (0.3 mg) into the muscle once as needed for anaphylaxis        prenatal multivitamin plus iron 27-0.8 MG Tabs per tablet      Take 1 tablet by mouth daily        ranitidine 150 MG tablet    ZANTAC    60 tablet    Take 1 tablet (150 mg) by mouth 2 times daily    Erythema multiforme       valACYclovir 500 MG tablet    VALTREX    30 tablet    1 tab PO daily    Erythema multiforme

## 2018-09-07 NOTE — PROGRESS NOTES
"CC: prenatal  S:good fm. No lof/vb/dc.  No ctx  /62 (BP Location: Left arm, Patient Position: Chair, Cuff Size: Adult Large)  Pulse 92  Temp 98.4  F (36.9  C) (Tympanic)  Resp 16  Ht 5' 5.75\" (1.67 m)  Wt 229 lb 6.4 oz (104.1 kg)  LMP 02/19/2018  BMI 37.31 kg/m2  US reviewed  Estimated fetal weight: 1,229 grams, corresponding to the 54th  percentile based on the reported previously established due date.          IMPRESSION:    1. Single live intrauterine pregnancy of 28 weeks and 6 days gestation  by current ultrasound measurement. Fetal growth is 4 days more  advanced than what is expected from the reported previously  established due date.  2. Estimated fetal weight is at the 54th percentile.   A/P tdap given today  Routine precautions and FKCTS reviewed  F/u 2 weeks  Chanel Watson MD       "

## 2018-09-07 NOTE — NURSING NOTE
"Chief Complaint   Patient presents with     Prenatal Care       Initial /62 (BP Location: Left arm, Patient Position: Chair, Cuff Size: Adult Large)  Pulse 92  Temp 98.4  F (36.9  C) (Tympanic)  Resp 16  Ht 5' 5.75\" (1.67 m)  Wt 229 lb 6.4 oz (104.1 kg)  LMP 02/19/2018  BMI 37.31 kg/m2 Estimated body mass index is 37.31 kg/(m^2) as calculated from the following:    Height as of this encounter: 5' 5.75\" (1.67 m).    Weight as of this encounter: 229 lb 6.4 oz (104.1 kg).  Medications and allergies reviewed.    Tuan ACKERMAN CMA    Screening Questionnaire for Adult Immunization    Are you sick today?   No   Do you have allergies to medications, food, a vaccine component or latex?   No   Have you ever had a serious reaction after receiving a vaccination?   No   Do you have a long-term health problem with heart disease, lung disease, asthma, kidney disease, metabolic disease (e.g. diabetes), anemia, or other blood disorder?   No   Do you have cancer, leukemia, HIV/AIDS, or any other immune system problem?   No   In the past 3 months, have you taken medications that affect  your immune system, such as prednisone, other steroids, or anticancer drugs; drugs for the treatment of rheumatoid arthritis, Crohn s disease, or psoriasis; or have you had radiation treatments?   No   Have you had a seizure, or a brain or other nervous system problem?   No   During the past year, have you received a transfusion of blood or blood     products, or been given immune (gamma) globulin or antiviral drug?   No   For women: Are you pregnant or is there a chance you could become        pregnant during the next month?   Yes   Have you received any vaccinations in the past 4 weeks?   No     Immunization questionnaire was positive for at least one answer.  Notified Dr. Mayorga.        Per orders of Dr. Mayorga, injection of Adacel given by Tuan Huerta. Patient instructed to remain in clinic for 15 minutes afterwards, and to report any " adverse reaction to me immediately.       Screening performed by Tuan Huerta on 9/7/2018 at 11:03 AM.

## 2018-09-19 ENCOUNTER — PRENATAL OFFICE VISIT (OUTPATIENT)
Dept: OBGYN | Facility: CLINIC | Age: 28
End: 2018-09-19
Payer: COMMERCIAL

## 2018-09-19 VITALS
HEIGHT: 66 IN | RESPIRATION RATE: 18 BRPM | WEIGHT: 235.6 LBS | HEART RATE: 99 BPM | SYSTOLIC BLOOD PRESSURE: 130 MMHG | BODY MASS INDEX: 37.86 KG/M2 | DIASTOLIC BLOOD PRESSURE: 69 MMHG | TEMPERATURE: 98.1 F

## 2018-09-19 DIAGNOSIS — Z34.03 ENCOUNTER FOR PRENATAL CARE IN THIRD TRIMESTER OF FIRST PREGNANCY: Primary | ICD-10-CM

## 2018-09-19 PROCEDURE — 99207 ZZC PRENATAL VISIT: CPT | Performed by: OBSTETRICS & GYNECOLOGY

## 2018-09-19 NOTE — MR AVS SNAPSHOT
After Visit Summary   9/19/2018    Jennifer Orlando    MRN: [id number]           Patient Information     Date Of Birth          1990        Visit Information        Provider Department      9/19/2018 11:30 AM Chanel Watson MD [facility]        Today's Diagnoses     Encounter for prenatal care in third trimester of first pregnancy    -  1       Follow-ups after your visit        Your next 10 appointments already scheduled     Oct 03, 2018  1:00 PM CDT   ESTABLISHED PRENATAL with Chanel Watson MD   [facility]    [address] [address]   [phone]            Oct 17, 2018 11:15 AM CDT   ESTABLISHED PRENATAL with Chanel Watson MD   [facility] ([facility])    [address] [address]   [phone]            Oct 31, 2018 11:00 AM CDT   ESTABLISHED PRENATAL with Chanel Watson MD   [facility] ([facility]    [address] [address]   [phone]              Who to contact     If you have questions or need follow up information about today's clinic visit or your schedule please contact [facility] directly at [phone].  Normal or non-critical lab and imaging results will be communicated to you by MyChart, letter or phone within 4 business days after the clinic has received the results. If you do not hear from us within 7 days, please contact the clinic through Tweegeehart or phone. If you have a critical or abnormal lab result, we will notify you by phone as soon as possible.  Submit refill requests through Quantifind or call your pharmacy and they will forward the refill request to us. Please allow 3 business days for your refill to be completed.          Additional Information About Your Visit        MyChart Information     [employer]ano gives you  "secure access to your electronic health record. If you see a primary care provider, you can also send messages to your care team and make appointments. If you have questions, please call your primary care clinic.  If you do not have a primary care provider, please call [phone] and they will assist you.        Care EveryWhere ID     This is your Care EveryWhere ID. This could be used by other organizations to access your [place] medical records  [id number]        Your Vitals Were     Pulse Temperature Respirations Height Last Period BMI (Body Mass Index)    99 98.1  F (36.7  C) (Tympanic) 18 5' 5.75\" (1.67 m) 02/19/2018 38.32 kg/m2       Blood Pressure from Last 3 Encounters:   09/19/18 130/69   09/07/18 126/62   08/08/18 139/76    Weight from Last 3 Encounters:   09/19/18 235 lb 9.6 oz (106.9 kg)   09/07/18 229 lb 6.4 oz (104.1 kg)   08/08/18 230 lb 12.8 oz (104.7 kg)              Today, you had the following     No orders found for display       Primary Care Provider Office Phone # Fax #    Philipp Pringle [phone] [address] [address] [address]        Equal Access to Services     GRISEL TOURE : Hadii aad ku hadasho Soomaali, waaxda luqadaha, qaybta kaalmada adeegyada, loyda idiin haygrant rivera . So [facility] [phone].    ATENCIÓN: Si habla español, tiene a larsen disposición servicios gratuitos de asistencia lingüística. Llame al [phone].    We comply with applicable federal civil rights laws and [place] laws. We do not discriminate on the basis of race, color, national origin, age, disability, sex, sexual orientation, or gender identity.            Thank you!     Thank you for choosing [facility]  for your care. Our goal is always to provide you with excellent care. Hearing back from our patients is one way we can continue to improve our services. Please take a few minutes to complete the written survey that you may receive in the mail " after your visit with us. Thank you!             Your Updated Medication List - Protect others around you: Learn how to safely use, store and throw away your medicines at www.disposemymeds.org.          This list is accurate as of 9/19/18 11:46 AM.  Always use your most recent med list.                   Brand Name Dispense Instructions for use Diagnosis    albuterol 108 (90 Base) MCG/ACT inhaler    PROAIR HFA/PROVENTIL HFA/VENTOLIN HFA    1 Inhaler    Inhale 2 puffs into the lungs every 6 hours as needed for shortness of breath / dyspnea (and before exercise)        cetirizine 10 MG tablet    zyrTEC    120 tablet    Take 2 tablets (20 mg) by mouth 2 times daily    Erythema multiforme       EPINEPHrine 0.3 MG/0.3ML injection 2-pack    EPIPEN/ADRENACLICK/or ANY BX GENERIC EQUIV    0.6 mL    Inject 0.3 mLs (0.3 mg) into the muscle once as needed for anaphylaxis        prenatal multivitamin plus iron 27-0.8 MG Tabs per tablet      Take 1 tablet by mouth daily        ranitidine 150 MG tablet    ZANTAC    60 tablet    Take 1 tablet (150 mg) by mouth 2 times daily    Erythema multiforme       valACYclovir 500 MG tablet    VALTREX    30 tablet    1 tab PO daily    Erythema multiforme

## 2018-09-19 NOTE — NURSING NOTE
"Initial /69 (BP Location: Left arm, Patient Position: Chair, Cuff Size: Adult Regular)  Pulse 99  Temp 98.1  F (36.7  C) (Tympanic)  Resp 18  Ht 5' 5.75\" (1.67 m)  Wt 235 lb 9.6 oz (106.9 kg)  LMP 02/19/2018  BMI 38.32 kg/m2 Estimated body mass index is 38.32 kg/(m^2) as calculated from the following:    Height as of this encounter: 5' 5.75\" (1.67 m).    Weight as of this encounter: 235 lb 9.6 oz (106.9 kg). .      "

## 2018-10-01 ENCOUNTER — HOSPITAL ENCOUNTER (OUTPATIENT)
Dept: ULTRASOUND IMAGING | Facility: CLINIC | Age: 28
Discharge: HOME | End: 2018-10-01
Attending: OBSTETRICS & GYNECOLOGY | Admitting: OBSTETRICS & GYNECOLOGY
Payer: COMMERCIAL

## 2018-10-01 DIAGNOSIS — Z34.02 ENCOUNTER FOR PRENATAL CARE IN SECOND TRIMESTER OF FIRST PREGNANCY: ICD-10-CM

## 2018-10-01 DIAGNOSIS — R03.0 ELEVATED BLOOD PRESSURE READING WITHOUT DIAGNOSIS OF HYPERTENSION: ICD-10-CM

## 2018-10-01 PROCEDURE — 76816 OB US FOLLOW-UP PER FETUS: CPT

## 2018-10-03 ENCOUNTER — PRENATAL OFFICE VISIT (OUTPATIENT)
Dept: OBGYN | Facility: CLINIC | Age: 28
End: 2018-10-03
Payer: COMMERCIAL

## 2018-10-03 ENCOUNTER — TELEPHONE (OUTPATIENT)
Dept: OBGYN | Facility: CLINIC | Age: 28
End: 2018-10-03

## 2018-10-03 VITALS
HEIGHT: 66 IN | WEIGHT: 239 LBS | TEMPERATURE: 98.4 F | DIASTOLIC BLOOD PRESSURE: 63 MMHG | SYSTOLIC BLOOD PRESSURE: 131 MMHG | BODY MASS INDEX: 38.41 KG/M2 | HEART RATE: 101 BPM | RESPIRATION RATE: 16 BRPM

## 2018-10-03 DIAGNOSIS — Z34.03 ENCOUNTER FOR PRENATAL CARE IN THIRD TRIMESTER OF FIRST PREGNANCY: Primary | ICD-10-CM

## 2018-10-03 DIAGNOSIS — O10.019 PRE-EXISTING ESSENTIAL HYPERTENSION DURING PREGNANCY, ANTEPARTUM: ICD-10-CM

## 2018-10-03 PROCEDURE — 99207 ZZC PRENATAL VISIT: CPT | Performed by: OBSTETRICS & GYNECOLOGY

## 2018-10-03 NOTE — PROGRESS NOTES
"CC: prenatal  Chronic hypertension  S: no ctx/lof/vb/dc.  Good fm.    /63 (BP Location: Right arm, Patient Position: Chair, Cuff Size: Adult Large)  Pulse 101  Temp 98.4  F (36.9  C) (Tympanic)  Resp 16  Ht 5' 5.75\" (1.67 m)  Wt 239 lb (108.4 kg)  LMP 02/19/2018  BMI 38.87 kg/m2   Gestational age based on the reported previously established due date:  32 weeks and 0 days, corresponding to an DANE of 11/26/2018.     Estimated fetal weight: 1,877 grams, corresponding to the 56th  percentile based on the reported previously established due date.          IMPRESSION:    1. Single live intrauterine pregnancy of 32 weeks and 3 days gestation  by current ultrasound measurement. Fetal growth is 3 days more  advanced than what is expected from the reported previously  established due date.  2. Estimated fetal weight is at the 56th percentile.  A/p chronic hypertension-growth US continue, normal growth  Routine precautions  F/u 2 weeks  Flu vaccine discussed and declined  Chanel Watson MD    "

## 2018-10-03 NOTE — TELEPHONE ENCOUNTER
Pt called stating that she needs a doctors note from today's visit with Dr. Mayorga and would like to know if can either be sent via email or on [employer]. Please advise.    Saray Todd  Clinic Station

## 2018-10-03 NOTE — NURSING NOTE
"Chief Complaint   Patient presents with     Prenatal Care       Initial /63 (BP Location: Right arm, Patient Position: Chair, Cuff Size: Adult Large)  Pulse 101  Temp 98.4  F (36.9  C) (Tympanic)  Resp 16  Ht 5' 5.75\" (1.67 m)  Wt 239 lb (108.4 kg)  LMP 02/19/2018  BMI 38.87 kg/m2 Estimated body mass index is 38.87 kg/(m^2) as calculated from the following:    Height as of this encounter: 5' 5.75\" (1.67 m).    Weight as of this encounter: 239 lb (108.4 kg).  Medications and allergies reviewed.    Tuan ACKERMAN, MARY    "

## 2018-10-03 NOTE — MR AVS SNAPSHOT
After Visit Summary   10/3/2018    Jennifer Orlando    MRN: [id number]           Patient Information     Date Of Birth          1990        Visit Information        Provider Department      10/3/2018 1:00 PM Chanel Watson MD [facility]        Today's Diagnoses     Encounter for prenatal care in third trimester of first pregnancy    -  1    Pre-existing essential hypertension during pregnancy, antepartum           Follow-ups after your visit        Your next 10 appointments already scheduled     Oct 17, 2018 11:30 AM CDT   ESTABLISHED PRENATAL with Mira Garcia MD   [facility]    [address] [address]   [phone]            Oct 31, 2018  1:00 PM CDT   ESTABLISHED PRENATAL with Kalie Cardenas MD   [facility])    [address] [address]   [phone]              Who to contact     If you have questions or need follow up information about today's clinic visit or your schedule please contact [facility] directly at [phone].  Normal or non-critical lab and imaging results will be communicated to you by MyChart, letter or phone within 4 business days after the clinic has received the results. If you do not hear from us within 7 days, please contact the clinic through MyChart or phone. If you have a critical or abnormal lab result, we will notify you by phone as soon as possible.  Submit refill requests through Groopie or call your pharmacy and they will forward the refill request to us. Please allow 3 business days for your refill to be completed.          Additional Information About Your Visit        MyChart Information     [employer]ie gives you secure access to your electronic health record. If you see a primary care provider, you can also send messages to your care team and make appointments. If you have  "questions, please call your primary care clinic.  If you do not have a primary care provider, please call [phone] and they will assist you.        Care EveryWhere ID     This is your Care EveryWhere ID. This could be used by other organizations to access your [place] medical records  [id number]        Your Vitals Were     Pulse Temperature Respirations Height Last Period BMI (Body Mass Index)    101 98.4  F (36.9  C) (Tympanic) 16 5' 5.75\" (1.67 m) 02/19/2018 38.87 kg/m2       Blood Pressure from Last 3 Encounters:   10/03/18 131/63   09/19/18 130/69   09/07/18 126/62    Weight from Last 3 Encounters:   10/03/18 239 lb (108.4 kg)   09/19/18 235 lb 9.6 oz (106.9 kg)   09/07/18 229 lb 6.4 oz (104.1 kg)              Today, you had the following     No orders found for display       Primary Care Provider Office Phone # Fax #    Philipp Pringle [phone] [address] [facility] [address]        Equal Access to Services     [facility]iumFIORELLA AH: Hadii aad ku hadasho Sorhinaali, waaxda luqadaha, qaybta kaalmada adeegyada, loyda rivera ah. So [facility] [phone].    ATENCIÓN: Si habla español, tiene a larsen disposición servicios gratuitos de asistencia lingüística. Llame al [phone].    We comply with applicable federal civil rights laws and [place] laws. We do not discriminate on the basis of race, color, national origin, age, disability, sex, sexual orientation, or gender identity.            Thank you!     Thank you for choosing [facility]  for your care. Our goal is always to provide you with excellent care. Hearing back from our patients is one way we can continue to improve our services. Please take a few minutes to complete the written survey that you may receive in the mail after your visit with us. Thank you!             Your Updated Medication List - Protect others around you: Learn how to safely use, store and throw away your medicines at " www.disposemymeds.org.          This list is accurate as of 10/3/18  1:21 PM.  Always use your most recent med list.                   Brand Name Dispense Instructions for use Diagnosis    albuterol 108 (90 Base) MCG/ACT inhaler    PROAIR HFA/PROVENTIL HFA/VENTOLIN HFA    1 Inhaler    Inhale 2 puffs into the lungs every 6 hours as needed for shortness of breath / dyspnea (and before exercise)        cetirizine 10 MG tablet    zyrTEC    120 tablet    Take 2 tablets (20 mg) by mouth 2 times daily    Erythema multiforme       EPINEPHrine 0.3 MG/0.3ML injection 2-pack    EPIPEN/ADRENACLICK/or ANY BX GENERIC EQUIV    0.6 mL    Inject 0.3 mLs (0.3 mg) into the muscle once as needed for anaphylaxis        prenatal multivitamin plus iron 27-0.8 MG Tabs per tablet      Take 1 tablet by mouth daily        ranitidine 150 MG tablet    ZANTAC    60 tablet    Take 1 tablet (150 mg) by mouth 2 times daily    Erythema multiforme       valACYclovir 500 MG tablet    VALTREX    30 tablet    1 tab PO daily    Erythema multiforme

## 2018-10-03 NOTE — LETTER
[facility]  [address] [address]  Phone: [phone]    October 3, 2018        Jennifer Orlando  [address]  [address]  [place] [address]          To whom it may concern:    RE: Jennifer Orlando    She is pregnant and needs to drink water as necessary.  Please allow her to have this at her work station.    Please contact me for questions or concerns.      Sincerely,        Chanel Watson MD

## 2018-10-03 NOTE — TELEPHONE ENCOUNTER
Letter is on patient chart.  Patient will call back with a fax number.  Please print and fax accordingly.    Thank you.    Flower Davila   Ob[facility]Gyn Clinic  SARAH

## 2018-10-03 NOTE — TELEPHONE ENCOUNTER
Letter printed - given to Dr. Mayorga to sign.  Await call back from pt - can fax or pt can  - or we can mail it.    -Isabelle Floyd  Clinic Station

## 2018-10-17 ENCOUNTER — PRENATAL OFFICE VISIT (OUTPATIENT)
Dept: OBGYN | Facility: CLINIC | Age: 28
End: 2018-10-17
Payer: COMMERCIAL

## 2018-10-17 VITALS
SYSTOLIC BLOOD PRESSURE: 134 MMHG | TEMPERATURE: 98.3 F | WEIGHT: 244.4 LBS | HEIGHT: 66 IN | HEART RATE: 91 BPM | RESPIRATION RATE: 16 BRPM | BODY MASS INDEX: 39.28 KG/M2 | DIASTOLIC BLOOD PRESSURE: 78 MMHG

## 2018-10-17 DIAGNOSIS — Z34.03 ENCOUNTER FOR PRENATAL CARE IN THIRD TRIMESTER OF FIRST PREGNANCY: Primary | ICD-10-CM

## 2018-10-17 DIAGNOSIS — R03.0 ELEVATED BLOOD PRESSURE READING WITHOUT DIAGNOSIS OF HYPERTENSION: ICD-10-CM

## 2018-10-17 PROCEDURE — 99207 ZZC PRENATAL VISIT: CPT | Performed by: OBSTETRICS & GYNECOLOGY

## 2018-10-17 NOTE — PROGRESS NOTES
"CC: Here for routine prenatal visit @ 34w2d   HPI:  Has been followed thus far with monthly growth scans and [facility]    PE: /78 (BP Location: Right arm, Patient Position: Chair, Cuff Size: Adult Large)  Pulse 91  Temp 98.3  F (36.8  C) (Tympanic)  Resp 16  Ht 5' 5.75\" (1.67 m)  Wt 244 lb 6.4 oz (110.9 kg)  LMP 02/19/2018  Breastfeeding? No  BMI 39.75 kg/m2   See OB flowsheet      A:  1. Elevated blood pressure reading without diagnosis of hypertension        Routine prenatal care  RTC 2 weeks.      Mira Garcia M.D.     "

## 2018-10-17 NOTE — NURSING NOTE
"Initial /78 (BP Location: Right arm, Patient Position: Chair, Cuff Size: Adult Large)  Pulse 91  Temp 98.3  F (36.8  C) (Tympanic)  Resp 16  Ht 5' 5.75\" (1.67 m)  Wt 244 lb 6.4 oz (110.9 kg)  LMP 02/19/2018  Breastfeeding? No  BMI 39.75 kg/m2 Estimated body mass index is 39.75 kg/(m^2) as calculated from the following:    Height as of this encounter: 5' 5.75\" (1.67 m).    Weight as of this encounter: 244 lb 6.4 oz (110.9 kg). .    Juliet Burkett, [facility]    "

## 2018-10-17 NOTE — MR AVS SNAPSHOT
After Visit Summary   10/17/2018    Jennifer Orlando    MRN: [id number]           Patient Information     Date Of Birth          1990        Visit Information        Provider Department      10/17/2018 11:30 AM Mira Garcia MD [facility]        Today's Diagnoses     Encounter for prenatal care in third trimester of first pregnancy    -  1    Elevated blood pressure reading without diagnosis of hypertension           Follow-ups after your visit        Your next 10 appointments already scheduled     Oct 31, 2018  1:00 PM CDT   ESTABLISHED PRENATAL with Kalie Cardenas MD   [facility]    [address] [address]   [phone]              Future tests that were ordered for you today     Open Future Orders        Priority Expected Expires Ordered    US OB >14 Weeks Follow Up Routine  10/17/2019 10/17/2018            Who to contact     If you have questions or need follow up information about today's clinic visit or your schedule please contact [facility] directly at [phone].  Normal or non-critical lab and imaging results will be communicated to you by MyChart, letter or phone within 4 business days after the clinic has received the results. If you do not hear from us within 7 days, please contact the clinic through BeneStreamhart or phone. If you have a critical or abnormal lab result, we will notify you by phone as soon as possible.  Submit refill requests through [employer] or call your pharmacy and they will forward the refill request to us. Please allow 3 business days for your refill to be completed.          Additional Information About Your Visit        BeneStreamhart Information     [employer] gives you secure access to your electronic health record. If you see a primary care provider, you can also send messages to your care team and make appointments. If you have questions, please call your  "primary care clinic.  If you do not have a primary care provider, please call [phone] and they will assist you.        Care EveryWhere ID     This is your Care EveryWhere ID. This could be used by other organizations to access your [place] medical records  [id number]        Your Vitals Were     Pulse Temperature Respirations Height Last Period Breastfeeding?    91 98.3  F (36.8  C) (Tympanic) 16 5' 5.75\" (1.67 m) 02/19/2018 No    BMI (Body Mass Index)                   39.75 kg/m2            Blood Pressure from Last 3 Encounters:   10/17/18 134/78   10/03/18 131/63   09/19/18 130/69    Weight from Last 3 Encounters:   10/17/18 244 lb 6.4 oz (110.9 kg)   10/03/18 239 lb (108.4 kg)   09/19/18 235 lb 9.6 oz (106.9 kg)               Primary Care Provider Office Phone # Fax #    Philipp Pringle [phone] [address] [address] [address]        Equal Access to Services     [facility] San FranciscoFIORELLA : Hadii todd ku hadasho Soleti, waaxda luqadaha, qaybta kaalalfonso dominguez, loyda rivera . So [facility] [phone].    ATENCIÓN: Si habla español, tiene a larsen disposición servicios gratuitos de asistencia lingüística. LlSelect Medical [facility] [phone].    We comply with applicable federal civil rights laws and [place] laws. We do not discriminate on the basis of race, color, national origin, age, disability, sex, sexual orientation, or gender identity.            Thank you!     Thank you for choosing [facility]  for your care. Our goal is always to provide you with excellent care. Hearing back from our patients is one way we can continue to improve our services. Please take a few minutes to complete the written survey that you may receive in the mail after your visit with us. Thank you!             Your Updated Medication List - Protect others around you: Learn how to safely use, store and throw away your medicines at www.disposemymeds.org.          This list is accurate " as of 10/17/18 11:37 AM.  Always use your most recent med list.                   Brand Name Dispense Instructions for use Diagnosis    albuterol 108 (90 Base) MCG/ACT inhaler    PROAIR HFA/PROVENTIL HFA/VENTOLIN HFA    1 Inhaler    Inhale 2 puffs into the lungs every 6 hours as needed for shortness of breath / dyspnea (and before exercise)        cetirizine 10 MG tablet    zyrTEC    120 tablet    Take 2 tablets (20 mg) by mouth 2 times daily    Erythema multiforme       EPINEPHrine 0.3 MG/0.3ML injection 2-pack    EPIPEN/ADRENACLICK/or ANY BX GENERIC EQUIV    0.6 mL    Inject 0.3 mLs (0.3 mg) into the muscle once as needed for anaphylaxis        prenatal multivitamin plus iron 27-0.8 MG Tabs per tablet      Take 1 tablet by mouth daily        ranitidine 150 MG tablet    ZANTAC    60 tablet    Take 1 tablet (150 mg) by mouth 2 times daily    Erythema multiforme       valACYclovir 500 MG tablet    VALTREX    30 tablet    1 tab PO daily    Erythema multiforme

## 2018-10-31 ENCOUNTER — HOSPITAL ENCOUNTER (OUTPATIENT)
Dept: ULTRASOUND IMAGING | Facility: CLINIC | Age: 28
Discharge: HOME | End: 2018-10-31
Attending: OBSTETRICS & GYNECOLOGY | Admitting: OBSTETRICS & GYNECOLOGY
Payer: COMMERCIAL

## 2018-10-31 DIAGNOSIS — R03.0 ELEVATED BLOOD PRESSURE READING WITHOUT DIAGNOSIS OF HYPERTENSION: ICD-10-CM

## 2018-10-31 PROCEDURE — 76816 OB US FOLLOW-UP PER FETUS: CPT

## 2018-11-02 ENCOUNTER — PRENATAL OFFICE VISIT (OUTPATIENT)
Dept: OBGYN | Facility: CLINIC | Age: 28
End: 2018-11-02
Payer: COMMERCIAL

## 2018-11-02 VITALS
RESPIRATION RATE: 18 BRPM | SYSTOLIC BLOOD PRESSURE: 137 MMHG | HEART RATE: 91 BPM | WEIGHT: 242 LBS | TEMPERATURE: 98.5 F | HEIGHT: 66 IN | DIASTOLIC BLOOD PRESSURE: 74 MMHG | BODY MASS INDEX: 38.89 KG/M2

## 2018-11-02 DIAGNOSIS — Z3A.36 36 WEEKS GESTATION OF PREGNANCY: Primary | ICD-10-CM

## 2018-11-02 PROCEDURE — 99207 ZZC PRENATAL VISIT: CPT | Performed by: OBSTETRICS & GYNECOLOGY

## 2018-11-02 PROCEDURE — 87653 STREP B DNA AMP PROBE: CPT | Performed by: OBSTETRICS & GYNECOLOGY

## 2018-11-02 PROCEDURE — 87186 SC STD MICRODIL/AGAR DIL: CPT | Performed by: OBSTETRICS & GYNECOLOGY

## 2018-11-02 NOTE — MR AVS SNAPSHOT
After Visit Summary   11/2/2018    Jennifer Orlando    MRN: [id number]           Patient Information     Date Of Birth          1990        Visit Information        Provider Department      11/2/2018 2:00 PM Mira Garcia MD [facility]        Today's Diagnoses     36 weeks gestation of pregnancy    -  1       Follow-ups after your visit        Your next 10 appointments already scheduled     Nov 07, 2018 10:30 AM CST   ESTABLISHED PRENATAL with Mira Garcia MD   [facility] ([facility]    [address] [address]   [phone]            Nov 14, 2018 11:00 AM CST   ESTABLISHED PRENATAL with Mira Garcia MD   [facility] ([facility]    [address] [address]   [phone]              Who to contact     If you have questions or need follow up information about today's clinic visit or your schedule please contact [facility] directly at [phone].  Normal or non-critical lab and imaging results will be communicated to you by trinkethart, letter or phone within 4 business days after the clinic has received the results. If you do not hear from us within 7 days, please contact the clinic through [employer] or phone. If you have a critical or abnormal lab result, we will notify you by phone as soon as possible.  Submit refill requests through Dsg.nr or call your pharmacy and they will forward the refill request to us. Please allow 3 business days for your refill to be completed.          Additional Information About Your Visit        MyChart Information     Dsg.nr gives you secure access to your electronic health record. If you see a primary care provider, you can also send messages to your care team and make appointments. If you have questions, please call your primary care clinic.  If you do not have a primary care provider, please call  "[phone] and they will assist you.        Care EveryWhere ID     This is your Care EveryWhere ID. This could be used by other organizations to access your [place] medical records  [id number]        Your Vitals Were     Pulse Temperature Respirations Height Last Period BMI (Body Mass Index)    91 98.5  F (36.9  C) (Tympanic) 18 5' 5.75\" (1.67 m) 02/19/2018 39.36 kg/m2       Blood Pressure from Last 3 Encounters:   11/02/18 137/74   10/17/18 134/78   10/03/18 131/63    Weight from Last 3 Encounters:   11/02/18 242 lb (109.8 kg)   10/17/18 244 lb 6.4 oz (110.9 kg)   10/03/18 239 lb (108.4 kg)              We Performed the Following     Strep, Group B by [facility]        Primary Care Provider Office Phone # Fax #    Philipp Pringle [phone] [address] [facility] [address]        Equal Access to Services     [facility]: Hadii aad ku hadasho Soomaali, waaxda luqadaha, qaybta kaalmada adeegyada, loyda rivera . So [facility] [phone].    ATENCIÓN: Si habla español, tiene a larsen disposición servicios gratuitos de asistencia lingüística. Llame al [phone].    We comply with applicable federal civil rights laws and [place] laws. We do not discriminate on the basis of race, color, national origin, age, disability, sex, sexual orientation, or gender identity.            Thank you!     Thank you for choosing [facility]  for your care. Our goal is always to provide you with excellent care. Hearing back from our patients is one way we can continue to improve our services. Please take a few minutes to complete the written survey that you may receive in the mail after your visit with us. Thank you!             Your Updated Medication List - Protect others around you: Learn how to safely use, store and throw away your medicines at www.disposemymeds.org.          This list is accurate as of 11/2/18  2:16 PM.  Always use your most recent med list.          "          Brand Name Dispense Instructions for use Diagnosis    albuterol 108 (90 Base) MCG/ACT inhaler    PROAIR HFA/PROVENTIL HFA/VENTOLIN HFA    1 Inhaler    Inhale 2 puffs into the lungs every 6 hours as needed for shortness of breath / dyspnea (and before exercise)        cetirizine 10 MG tablet    zyrTEC    120 tablet    Take 2 tablets (20 mg) by mouth 2 times daily    Erythema multiforme       EPINEPHrine 0.3 MG/0.3ML injection 2-pack    EPIPEN/ADRENACLICK/or ANY BX GENERIC EQUIV    0.6 mL    Inject 0.3 mLs (0.3 mg) into the muscle once as needed for anaphylaxis        prenatal multivitamin plus iron 27-0.8 MG Tabs per tablet      Take 1 tablet by mouth daily        ranitidine 150 MG tablet    ZANTAC    60 tablet    Take 1 tablet (150 mg) by mouth 2 times daily    Erythema multiforme       valACYclovir 500 MG tablet    VALTREX    30 tablet    1 tab PO daily    Erythema multiforme

## 2018-11-02 NOTE — PROGRESS NOTES
"CC: Here for routine prenatal visit @ 36w4d   HPI:  Doing well; reviewed s/s of labor; last growth scan at apparox 30%; GBS taken    PE: /74 (BP Location: Right arm, Patient Position: Chair, Cuff Size: Adult Large)  Pulse 91  Temp 98.5  F (36.9  C) (Tympanic)  Resp 18  Ht 5' 5.75\" (1.67 m)  Wt 242 lb (109.8 kg)  LMP 02/19/2018  BMI 39.36 kg/m2   See OB flowsheet      A:  1. 36 weeks gestation of pregnancy    - Strep, Group B by PCR      Routine prenatal care  RTC 1 weeks.      Mira Garcia M.D.     "

## 2018-11-03 LAB
GP B STREP DNA SPEC QL NAA+PROBE: POSITIVE
SPECIMEN SOURCE: ABNORMAL

## 2018-11-05 ENCOUNTER — TELEPHONE (OUTPATIENT)
Dept: OBGYN | Facility: CLINIC | Age: 28
End: 2018-11-05

## 2018-11-05 PROBLEM — O99.820 GBS (GROUP B STREPTOCOCCUS CARRIER), +RV CULTURE, CURRENTLY PREGNANT: Status: ACTIVE | Noted: 2018-11-05

## 2018-11-05 NOTE — TELEPHONE ENCOUNTER
Reason for Call:  Form, our goal is to have forms completed with 72 hours, however, some forms may require a visit or additional information.    Type of letter, form or note:  FMLA    Who is the form from?: Patient    Where did the form come from: Patient or family brought in       What clinic location was the form placed at?: [facility]    Where the form was placed: Given to physician    What number is listed as a contact on the form?: [phone]       Additional comments: mail forms     Call taken on 11/5/2018 at 11:13 AM by Brittany Mcgee

## 2018-11-07 ENCOUNTER — PRENATAL OFFICE VISIT (OUTPATIENT)
Dept: OBGYN | Facility: CLINIC | Age: 28
End: 2018-11-07
Payer: COMMERCIAL

## 2018-11-07 VITALS
HEART RATE: 74 BPM | DIASTOLIC BLOOD PRESSURE: 79 MMHG | BODY MASS INDEX: 39.21 KG/M2 | RESPIRATION RATE: 18 BRPM | WEIGHT: 244 LBS | SYSTOLIC BLOOD PRESSURE: 139 MMHG | TEMPERATURE: 98.2 F | HEIGHT: 66 IN

## 2018-11-07 DIAGNOSIS — Z34.03 ENCOUNTER FOR PRENATAL CARE IN THIRD TRIMESTER OF FIRST PREGNANCY: Primary | ICD-10-CM

## 2018-11-07 LAB
BACTERIA SPEC CULT: ABNORMAL
SPECIMEN SOURCE: ABNORMAL

## 2018-11-07 PROCEDURE — 99207 ZZC PRENATAL VISIT: CPT | Performed by: OBSTETRICS & GYNECOLOGY

## 2018-11-07 NOTE — MR AVS SNAPSHOT
After Visit Summary   11/7/2018    Jennifer Orlando    MRN: [id number]           Patient Information     Date Of Birth          1990        Visit Information        Provider Department      11/7/2018 10:30 AM Mira Garcia MD [facility]        Today's Diagnoses     Encounter for prenatal care in third trimester of first pregnancy    -  1       Follow-ups after your visit        Your next 10 appointments already scheduled     Nov 14, 2018 11:00 AM CST   ESTABLISHED PRENATAL with Mira Garcia MD   [facility] ([facility]    [address] [address]   [phone]            Nov 23, 2018  1:00 PM CST   ESTABLISHED PRENATAL with Kalie Cardenas MD   [facility] ([facility])    [address] [address]   [phone]              Who to contact     If you have questions or need follow up information about today's clinic visit or your schedule please contact [facility] directly at [phone].  Normal or non-critical lab and imaging results will be communicated to you by iMOSPHEREhart, letter or phone within 4 business days after the clinic has received the results. If you do not hear from us within 7 days, please contact the clinic through iMOSPHEREhart or phone. If you have a critical or abnormal lab result, we will notify you by phone as soon as possible.  Submit refill requests through [employer] or call your pharmacy and they will forward the refill request to us. Please allow 3 business days for your refill to be completed.          Additional Information About Your Visit        iMOSPHEREhart Information     GoCoin gives you secure access to your electronic health record. If you see a primary care provider, you can also send messages to your care team and make appointments. If you have questions, please call your primary care clinic.  If you do not have a  "primary care provider, please call [phone] and they will assist you.        Care EveryWhere ID     This is your Care EveryWhere ID. This could be used by other organizations to access your [place] medical records  [id number]        Your Vitals Were     Pulse Temperature Respirations Height Last Period BMI (Body Mass Index)    74 98.2  F (36.8  C) (Tympanic) 18 5' 5.75\" (1.67 m) 02/19/2018 39.68 kg/m2       Blood Pressure from Last 3 Encounters:   11/07/18 139/79   11/02/18 137/74   10/17/18 134/78    Weight from Last 3 Encounters:   11/07/18 244 lb (110.7 kg)   11/02/18 242 lb (109.8 kg)   10/17/18 244 lb 6.4 oz (110.9 kg)              Today, you had the following     No orders found for display       Primary Care Provider Office Phone # Fax #    Philipp Pringle [phone] [address] [address]        Equal Access to Services     [facility]: Hadii todd ho hadasho Soleti, waaxda luqadaha, qaybta kaalmada angelica, loyda rivera . So [facility] [phone].    ATENCIÓN: Si habla español, tiene a larsen disposición servicios gratuitos de asistencia lingüística. [facility] [phone].    We comply with applicable federal civil rights laws and [place] laws. We do not discriminate on the basis of race, color, national origin, age, disability, sex, sexual orientation, or gender identity.            Thank you!     Thank you for choosing [facility]  for your care. Our goal is always to provide you with excellent care. Hearing back from our patients is one way we can continue to improve our services. Please take a few minutes to complete the written survey that you may receive in the mail after your visit with us. Thank you!             Your Updated Medication List - Protect others around you: Learn how to safely use, store and throw away your medicines at www.disposemymeds.org.          This list is accurate as of 11/7/18 10:46 AM.  " Always use your most recent med list.                   Brand Name Dispense Instructions for use Diagnosis    albuterol 108 (90 Base) MCG/ACT inhaler    PROAIR HFA/PROVENTIL HFA/VENTOLIN HFA    1 Inhaler    Inhale 2 puffs into the lungs every 6 hours as needed for shortness of breath / dyspnea (and before exercise)        cetirizine 10 MG tablet    zyrTEC    120 tablet    Take 2 tablets (20 mg) by mouth 2 times daily    Erythema multiforme       EPINEPHrine 0.3 MG/0.3ML injection 2-pack    EPIPEN/ADRENACLICK/or ANY BX GENERIC EQUIV    0.6 mL    Inject 0.3 mLs (0.3 mg) into the muscle once as needed for anaphylaxis        prenatal multivitamin plus iron 27-0.8 MG Tabs per tablet      Take 1 tablet by mouth daily        ranitidine 150 MG tablet    ZANTAC    60 tablet    Take 1 tablet (150 mg) by mouth 2 times daily    Erythema multiforme       valACYclovir 500 MG tablet    VALTREX    30 tablet    1 tab PO daily    Erythema multiforme

## 2018-11-07 NOTE — PROGRESS NOTES
"CC: Here for routine prenatal visit @ 37w2d   HPI:  Feeling well; denies contractions; informed of GBS positive status    PE: /79 (BP Location: Right arm, Patient Position: Chair, Cuff Size: Adult Large)  Pulse 74  Temp 98.2  F (36.8  C) (Tympanic)  Resp 18  Ht 5' 5.75\" (1.67 m)  Wt 244 lb (110.7 kg)  LMP 02/19/2018  BMI 39.68 kg/m2   See OB flowsheet      A:  1. Encounter for prenatal care in third trimester of first pregnancy        Routine prenatal care  RTC 1 weeks.      Mira Garcia M.D.     "

## 2018-11-07 NOTE — TELEPHONE ENCOUNTER
Forms were signed and faxed and mailed then sent to be scanned and copy at .    Brittany Mazaton  [facility]

## 2018-11-14 ENCOUNTER — PRENATAL OFFICE VISIT (OUTPATIENT)
Dept: OBGYN | Facility: CLINIC | Age: 28
End: 2018-11-14
Payer: COMMERCIAL

## 2018-11-14 VITALS
SYSTOLIC BLOOD PRESSURE: 146 MMHG | DIASTOLIC BLOOD PRESSURE: 81 MMHG | WEIGHT: 249 LBS | HEIGHT: 66 IN | RESPIRATION RATE: 18 BRPM | HEART RATE: 105 BPM | BODY MASS INDEX: 40.02 KG/M2 | TEMPERATURE: 98.3 F

## 2018-11-14 DIAGNOSIS — R03.0 ELEVATED BLOOD PRESSURE READING WITHOUT DIAGNOSIS OF HYPERTENSION: ICD-10-CM

## 2018-11-14 DIAGNOSIS — Z34.03 ENCOUNTER FOR PRENATAL CARE IN THIRD TRIMESTER OF FIRST PREGNANCY: Primary | ICD-10-CM

## 2018-11-14 PROCEDURE — 99207 ZZC PRENATAL VISIT: CPT | Performed by: OBSTETRICS & GYNECOLOGY

## 2018-11-14 NOTE — MR AVS SNAPSHOT
After Visit Summary   11/14/2018    Jennifer Orlando    MRN: [id number]           Patient Information     Date Of Birth          1990        Visit Information        Provider Department      11/14/2018 11:00 AM Mira Garcia MD [facility]        Today's Diagnoses     Encounter for prenatal care in third trimester of first pregnancy    -  1    Elevated blood pressure reading without diagnosis of hypertension           Follow-ups after your visit        Your next 10 appointments already scheduled     Nov 21, 2018 10:15 AM CST   ESTABLISHED PRENATAL with Mira Garcia MD   [facility] ([facility]    [address] [address]   [phone]              Who to contact     If you have questions or need follow up information about today's clinic visit or your schedule please contact [facility] directly at [phone].  Normal or non-critical lab and imaging results will be communicated to you by MyChart, letter or phone within 4 business days after the clinic has received the results. If you do not hear from us within 7 days, please contact the clinic through ID.mehart or phone. If you have a critical or abnormal lab result, we will notify you by phone as soon as possible.  Submit refill requests through Improve Digital or call your pharmacy and they will forward the refill request to us. Please allow 3 business days for your refill to be completed.          Additional Information About Your Visit        MyChart Information     Improve Digital gives you secure access to your electronic health record. If you see a primary care provider, you can also send messages to your care team and make appointments. If you have questions, please call your primary care clinic.  If you do not have a primary care provider, please call [phone] and they will assist you.        Care EveryWhere ID     This is your Care EveryWhere ID. This  "could be used by other organizations to access your [place] medical records  [id number]        Your Vitals Were     Pulse Temperature Respirations Height Last Period BMI (Body Mass Index)    105 98.3  F (36.8  C) (Tympanic) 18 5' 5.75\" (1.67 m) 02/19/2018 40.5 kg/m2       Blood Pressure from Last 3 Encounters:   11/14/18 146/81   11/07/18 139/79   11/02/18 137/74    Weight from Last 3 Encounters:   11/14/18 249 lb (112.9 kg)   11/07/18 244 lb (110.7 kg)   11/02/18 242 lb (109.8 kg)              Today, you had the following     No orders found for display       Primary Care Provider Office Phone # Fax #    Philipp Pringle [phone] [address] [facility] [address]        Equal Access to Services     [facility] : Hadii todd ho hadasho Soleti, waaxda luqadaha, qaybta kaalmada adeegyada, loyda rivera . So [facility] [phone].    ATENCIÓN: Si habla español, tiene a larsen disposición servicios gratuitos de asistencia lingüística. Llame al [phone].    We comply with applicable federal civil rights laws and [place] laws. We do not discriminate on the basis of race, color, national origin, age, disability, sex, sexual orientation, or gender identity.            Thank you!     Thank you for choosing [facility]  for your care. Our goal is always to provide you with excellent care. Hearing back from our patients is one way we can continue to improve our services. Please take a few minutes to complete the written survey that you may receive in the mail after your visit with us. Thank you!             Your Updated Medication List - Protect others around you: Learn how to safely use, store and throw away your medicines at www.disposemymeds.org.          This list is accurate as of 11/14/18 11:40 AM.  Always use your most recent med list.                   Brand Name Dispense Instructions for use Diagnosis    albuterol 108 (90 Base) MCG/ACT inhaler    " PROAIR HFA/PROVENTIL HFA/VENTOLIN HFA    1 Inhaler    Inhale 2 puffs into the lungs every 6 hours as needed for shortness of breath / dyspnea (and before exercise)        cetirizine 10 MG tablet    zyrTEC    120 tablet    Take 2 tablets (20 mg) by mouth 2 times daily    Erythema multiforme       EPINEPHrine 0.3 MG/0.3ML injection 2-pack    EPIPEN/ADRENACLICK/or ANY BX GENERIC EQUIV    0.6 mL    Inject 0.3 mLs (0.3 mg) into the muscle once as needed for anaphylaxis        prenatal multivitamin plus iron 27-0.8 MG Tabs per tablet      Take 1 tablet by mouth daily        ranitidine 150 MG tablet    ZANTAC    60 tablet    Take 1 tablet (150 mg) by mouth 2 times daily    Erythema multiforme       valACYclovir 500 MG tablet    VALTREX    30 tablet    1 tab PO daily    Erythema multiforme

## 2018-11-14 NOTE — PROGRESS NOTES
"CC: Here for routine prenatal visit @ 38w2d   HPI:  No headache or other scotomata; no real contractions    PE: /81 (BP Location: Right arm, Patient Position: Chair, Cuff Size: Adult Large)  Pulse 105  Temp 98.3  F (36.8  C) (Tympanic)  Resp 18  Ht 5' 5.75\" (1.67 m)  Wt 249 lb (112.9 kg)  LMP 02/19/2018  BMI 40.5 kg/m2   See OB flowsheet      A:  1. Encounter for prenatal care in third trimester of first pregnancy      2. Elevated blood pressure reading without diagnosis of hypertension        Routine prenatal care  RTC 1 weeks, will then assess for cervical ripening/IOL      Mira Garcia M.D.     "

## 2018-11-14 NOTE — LETTER
[facility]  [address] [address]  Phone: [phone]    November 14, 2018        Jennifer Orlando  [address]  [place] [address]          To whom it may concern:    RE: Jennifer Rodriguez will no longer be working effective 11/15/18 due to pregnancy and impending delivery.    Please contact me for questions or concerns.      Sincerely,        Mira Garcia MD

## 2018-11-19 ENCOUNTER — HOSPITAL ENCOUNTER (INPATIENT)
Facility: CLINIC | Age: 28
LOS: 3 days | Discharge: HOME | End: 2018-11-22
Attending: OBSTETRICS & GYNECOLOGY | Admitting: OBSTETRICS & GYNECOLOGY
Payer: COMMERCIAL

## 2018-11-19 PROBLEM — Z34.83 PRENATAL CARE, SUBSEQUENT PREGNANCY IN THIRD TRIMESTER: Status: ACTIVE | Noted: 2018-11-19

## 2018-11-19 LAB
ALT SERPL W P-5'-P-CCNC: 16 U/L (ref 0–50)
AST SERPL W P-5'-P-CCNC: 15 U/L (ref 0–45)
BASOPHILS # BLD AUTO: 0 10E9/L (ref 0–0.2)
BASOPHILS NFR BLD AUTO: 0.3 %
CREAT SERPL-MCNC: 0.6 MG/DL (ref 0.52–1.04)
DIFFERENTIAL METHOD BLD: ABNORMAL
EOSINOPHIL # BLD AUTO: 0.1 10E9/L (ref 0–0.7)
EOSINOPHIL NFR BLD AUTO: 1 %
ERYTHROCYTE [DISTWIDTH] IN BLOOD BY AUTOMATED COUNT: 14.1 % (ref 10–15)
GFR SERPL CREATININE-BSD FRML MDRD: >90 ML/MIN/1.7M2
HCT VFR BLD AUTO: 34.8 % (ref 35–47)
HGB BLD-MCNC: 11.3 G/DL (ref 11.7–15.7)
IMM GRANULOCYTES # BLD: 0.2 10E9/L (ref 0–0.4)
IMM GRANULOCYTES NFR BLD: 1.4 %
LYMPHOCYTES # BLD AUTO: 2.7 10E9/L (ref 0.8–5.3)
LYMPHOCYTES NFR BLD AUTO: 21.9 %
MCH RBC QN AUTO: 27.4 PG (ref 26.5–33)
MCHC RBC AUTO-ENTMCNC: 32.5 G/DL (ref 31.5–36.5)
MCV RBC AUTO: 85 FL (ref 78–100)
MONOCYTES # BLD AUTO: 1 10E9/L (ref 0–1.3)
MONOCYTES NFR BLD AUTO: 7.9 %
NEUTROPHILS # BLD AUTO: 8.3 10E9/L (ref 1.6–8.3)
NEUTROPHILS NFR BLD AUTO: 67.5 %
NRBC # BLD AUTO: 0 10*3/UL
NRBC BLD AUTO-RTO: 0 /100
PLATELET # BLD AUTO: 207 10E9/L (ref 150–450)
RBC # BLD AUTO: 4.12 10E12/L (ref 3.8–5.2)
RUPTURE OF FETAL MEMBRANES BY ROM PLUS: POSITIVE
WBC # BLD AUTO: 12.2 10E9/L (ref 4–11)

## 2018-11-19 PROCEDURE — 84460 ALANINE AMINO (ALT) (SGPT): CPT | Performed by: OBSTETRICS & GYNECOLOGY

## 2018-11-19 PROCEDURE — 86780 TREPONEMA PALLIDUM: CPT | Performed by: OBSTETRICS & GYNECOLOGY

## 2018-11-19 PROCEDURE — 82565 ASSAY OF CREATININE: CPT | Performed by: OBSTETRICS & GYNECOLOGY

## 2018-11-19 PROCEDURE — 12000031 ZZH R&B OB CRITICAL

## 2018-11-19 PROCEDURE — 86901 BLOOD TYPING SEROLOGIC RH(D): CPT | Performed by: OBSTETRICS & GYNECOLOGY

## 2018-11-19 PROCEDURE — 84156 ASSAY OF PROTEIN URINE: CPT | Performed by: OBSTETRICS & GYNECOLOGY

## 2018-11-19 PROCEDURE — 25000128 H RX IP 250 OP 636: Performed by: OBSTETRICS & GYNECOLOGY

## 2018-11-19 PROCEDURE — 85025 COMPLETE CBC W/AUTO DIFF WBC: CPT | Performed by: OBSTETRICS & GYNECOLOGY

## 2018-11-19 PROCEDURE — 36415 COLL VENOUS BLD VENIPUNCTURE: CPT | Performed by: OBSTETRICS & GYNECOLOGY

## 2018-11-19 PROCEDURE — 84450 TRANSFERASE (AST) (SGOT): CPT | Performed by: OBSTETRICS & GYNECOLOGY

## 2018-11-19 PROCEDURE — 84112 EVAL AMNIOTIC FLUID PROTEIN: CPT | Performed by: OBSTETRICS & GYNECOLOGY

## 2018-11-19 PROCEDURE — 86900 BLOOD TYPING SEROLOGIC ABO: CPT | Performed by: OBSTETRICS & GYNECOLOGY

## 2018-11-19 RX ORDER — FENTANYL CITRATE 50 UG/ML
50-100 INJECTION, SOLUTION INTRAMUSCULAR; INTRAVENOUS
Status: DISCONTINUED | OUTPATIENT
Start: 2018-11-19 | End: 2018-11-21

## 2018-11-19 RX ORDER — ONDANSETRON 2 MG/ML
4 INJECTION INTRAMUSCULAR; INTRAVENOUS EVERY 6 HOURS PRN
Status: DISCONTINUED | OUTPATIENT
Start: 2018-11-19 | End: 2018-11-21

## 2018-11-19 RX ORDER — OXYTOCIN 10 [USP'U]/ML
10 INJECTION, SOLUTION INTRAMUSCULAR; INTRAVENOUS
Status: DISCONTINUED | OUTPATIENT
Start: 2018-11-19 | End: 2018-11-21

## 2018-11-19 RX ORDER — HYDROXYZINE HYDROCHLORIDE 50 MG/1
50 TABLET, FILM COATED ORAL EVERY 6 HOURS PRN
Status: DISCONTINUED | OUTPATIENT
Start: 2018-11-19 | End: 2018-11-22 | Stop reason: HOSPADM

## 2018-11-19 RX ORDER — OXYTOCIN/0.9 % SODIUM CHLORIDE 30/500 ML
100-340 PLASTIC BAG, INJECTION (ML) INTRAVENOUS CONTINUOUS PRN
Status: COMPLETED | OUTPATIENT
Start: 2018-11-19 | End: 2018-11-20

## 2018-11-19 RX ORDER — OXYTOCIN/0.9 % SODIUM CHLORIDE 30/500 ML
1-24 PLASTIC BAG, INJECTION (ML) INTRAVENOUS CONTINUOUS
Status: DISCONTINUED | OUTPATIENT
Start: 2018-11-19 | End: 2018-11-21

## 2018-11-19 RX ORDER — IBUPROFEN 800 MG/1
800 TABLET, FILM COATED ORAL
Status: DISCONTINUED | OUTPATIENT
Start: 2018-11-19 | End: 2018-11-21

## 2018-11-19 RX ORDER — METHYLERGONOVINE MALEATE 0.2 MG/ML
200 INJECTION INTRAVENOUS
Status: DISCONTINUED | OUTPATIENT
Start: 2018-11-19 | End: 2018-11-21

## 2018-11-19 RX ORDER — LIDOCAINE 40 MG/G
CREAM TOPICAL
Status: DISCONTINUED | OUTPATIENT
Start: 2018-11-19 | End: 2018-11-21

## 2018-11-19 RX ORDER — SODIUM CHLORIDE, SODIUM LACTATE, POTASSIUM CHLORIDE, CALCIUM CHLORIDE 600; 310; 30; 20 MG/100ML; MG/100ML; MG/100ML; MG/100ML
INJECTION, SOLUTION INTRAVENOUS CONTINUOUS
Status: DISCONTINUED | OUTPATIENT
Start: 2018-11-19 | End: 2018-11-21

## 2018-11-19 RX ORDER — OXYCODONE AND ACETAMINOPHEN 5; 325 MG/1; MG/1
1 TABLET ORAL
Status: DISCONTINUED | OUTPATIENT
Start: 2018-11-19 | End: 2018-11-21

## 2018-11-19 RX ORDER — HYDROXYZINE HYDROCHLORIDE 25 MG/1
25 TABLET, FILM COATED ORAL EVERY 6 HOURS PRN
Status: DISCONTINUED | OUTPATIENT
Start: 2018-11-19 | End: 2018-11-22 | Stop reason: HOSPADM

## 2018-11-19 RX ORDER — ACETAMINOPHEN 325 MG/1
650 TABLET ORAL EVERY 4 HOURS PRN
Status: DISCONTINUED | OUTPATIENT
Start: 2018-11-19 | End: 2018-11-21

## 2018-11-19 RX ORDER — NALOXONE HYDROCHLORIDE 0.4 MG/ML
.1-.4 INJECTION, SOLUTION INTRAMUSCULAR; INTRAVENOUS; SUBCUTANEOUS
Status: DISCONTINUED | OUTPATIENT
Start: 2018-11-19 | End: 2018-11-21

## 2018-11-19 RX ORDER — PENICILLIN G POTASSIUM 5000000 [IU]/1
5 INJECTION, POWDER, FOR SOLUTION INTRAMUSCULAR; INTRAVENOUS ONCE
Status: COMPLETED | OUTPATIENT
Start: 2018-11-19 | End: 2018-11-19

## 2018-11-19 RX ORDER — CARBOPROST TROMETHAMINE 250 UG/ML
250 INJECTION, SOLUTION INTRAMUSCULAR
Status: DISCONTINUED | OUTPATIENT
Start: 2018-11-19 | End: 2018-11-21

## 2018-11-19 RX ADMIN — PENICILLIN G POTASSIUM 5 MILLION UNITS: 5000000 POWDER, FOR SOLUTION INTRAMUSCULAR; INTRAPLEURAL; INTRATHECAL; INTRAVENOUS at 22:15

## 2018-11-19 NOTE — IP AVS SNAPSHOT
[facility]    [address] [address]    Phone:  [phone]    Fax:  [phone]                                       After Visit Summary   11/19/2018    Jennifer Orlando    MRN: [id number]           After Visit Summary Signature Page     I have received my discharge instructions, and my questions have been answered. I have discussed any challenges I see with this plan with the nurse or doctor.    ..........................................................................................................................................  Patient/Patient Representative Signature      ..........................................................................................................................................  Patient Representative Print Name and Relationship to Patient    ..................................................               ................................................  Date                                   Time    ..........................................................................................................................................  Reviewed by Signature/Title    ...................................................              ..............................................  Date                                               Time          22EPIC Rev 08/18

## 2018-11-19 NOTE — IP AVS SNAPSHOT
MRN:[id number]                      After Visit Summary   11/19/2018    Jennifer Orlando    MRN: [id number]           Thank you!     Thank you for choosing [facility] for your care. Our goal is always to provide you with excellent care. Hearing back from our patients is one way we can continue to improve our services. Please take a few minutes to complete the written survey that you may receive in the mail after you visit with us. Thank you!        Patient Information     Date Of Birth          1990        Designated Caregiver       Most Recent Value    Caregiver    Will someone help with your care after discharge? yes    Name of designated caregiver Abraham    Phone number of caregiver [phone]    Caregiver address Same as pt      About your hospital stay     You were admitted on:  November 19, 2018 You last received care in the:  [facility]    You were discharged on:  November 22, 2018       Who to Call     For medical emergencies, please call 911.  For non-urgent questions about your medical care, please call your primary care provider or clinic, [phone]          Attending Provider     Provider Specialty    Triny Soni MD OB/Gyn    Esther Swanson MD OB/Gyn       Primary Care Provider Office Phone # Fax #    Philipp Pringle [phone]      Further instructions from your care team       Postpartum Vaginal Delivery Instructions    Activity       Ask family and friends for help when you need it.    Do not place anything in your vagina for 6 weeks.    You are not restricted on other activities, but take it easy for a few weeks to allow your body to recover from delivery.  You are able to do any activities you feel up to that point.    No driving until you have stopped taking your pain medications (usually two weeks after delivery).     Call your health care provider if you have any of these symptoms:       Increased pain, swelling, redness, or fluid  around your stiches from an episiotomy or perineal tear.    A fever above 100.4 F (38 C) with or without chills when placing a thermometer under your tongue.    You soak a sanitary pad with blood within 1 hour, or you see blood clots larger than a golf ball.    Bleeding that lasts more than 6 weeks.    Vaginal discharge that smells bad.    Severe pain, cramping or tenderness in your lower belly area.    A need to urinate more frequently (use the toilet more often), more urgently (use the toilet very quickly), or it burns when you urinate.    Nausea and vomiting.    Redness, swelling or pain around a vein in your leg.    Problems breastfeeding or a red or painful area on your breast.    Chest pain and cough or are gasping for air.    Problems coping with sadness, anxiety, or depression.  If you have any concerns about hurting yourself or the baby, call your provider immediately.     You have questions or concerns after you return home.     Keep your hands clean:  Always wash your hands before touching your perineal area and stitches.  This helps reduce your risk of infection.  If your hands aren't dirty, you may use an alcohol hand-rub to clean your hands. Keep your nails clean and short.     For problems or questions with breastfeeding after discharge call: [phone] at the [facility] clinic.  After hours you may leave a message and your call will returned the next morning. You may also call the [facility] to answer questions, [phone].    If you you feel sad, have difficulty sleeping, feel overwhelmed, anxious or have troubling thoughts you may call your clinic, or the HelpLine for Pregnancy & Postpartum Support [place]. (PPS HelpLine 067-703-8981) or online resource list  @ www.ppsupport[place].org          Pending Results     No orders found from 11/17/2018 to 11/20/2018.            Statement of Approval     Ordered          11/22/18 0946  I have reviewed and agree with all the recommendations and orders detailed in this  document.  EFFECTIVE NOW     Approved and electronically signed by:  Mira Garcia MD             Admission Information     Date & Time Provider Department Dept. Phone    11/19/2018 Esther Swanson MD [facility] [phone]      Your Vitals Were     Blood Pressure Pulse Temperature Respirations Last Period Pulse Oximetry    128/71 80 98  F (36.7  C) (Oral) 16 02/19/2018 97%      MyChart Information     [facility] gives you secure access to your electronic health record. If you see a primary care provider, you can also send messages to your care team and make appointments. If you have questions, please call your primary care clinic.  If you do not have a primary care provider, please call [phone] and they will assist you.        Care EveryWhere ID     This is your Care EveryWhere ID. This could be used by other organizations to access your [place] medical records  [id number]        Equal Access to Services     GRISEL TOURE : Hadii todd mcguireo Soleti, waaxda luqadaha, qaybta kaalmada adelisa, loyda boucher. So [facility] [phone].    ATENCIÓN: Si habla español, tiene a larsen disposición servicios gratuitos de asistencia lingüística. Llame al [phone].    We comply with applicable federal civil rights laws and [place] laws. We do not discriminate on the basis of race, color, national origin, age, disability, sex, sexual orientation, or gender identity.               Review of your medicines      CONTINUE these medicines which have NOT CHANGED        Dose / Directions    prenatal multivitamin plus iron 27-0.8 MG Tabs per tablet        Dose:  1 tablet   Take 1 tablet by mouth daily   Refills:  0       ranitidine 150 MG tablet   Commonly known as:  ZANTAC   Used for:  Erythema multiforme        Dose:  150 mg   Take 1 tablet (150 mg) by mouth 2 times daily   Quantity:  60 tablet   Refills:  11                Protect others around you: Learn how to  safely use, store and throw away your medicines at www.disposemymeds.org.             Medication List: This is a list of all your medications and when to take them. Check marks below indicate your daily home schedule. Keep this list as a reference.      Medications           Morning Afternoon Evening Bedtime As Needed    prenatal multivitamin plus iron 27-0.8 MG Tabs per tablet   Take 1 tablet by mouth daily                                ranitidine 150 MG tablet   Commonly known as:  ZANTAC   Take 1 tablet (150 mg) by mouth 2 times daily

## 2018-11-20 ENCOUNTER — ANESTHESIA (OUTPATIENT)
Dept: OBGYN | Facility: CLINIC | Age: 28
End: 2018-11-20
Payer: COMMERCIAL

## 2018-11-20 ENCOUNTER — ANESTHESIA EVENT (OUTPATIENT)
Dept: OBGYN | Facility: CLINIC | Age: 28
End: 2018-11-20
Payer: COMMERCIAL

## 2018-11-20 LAB
ABO + RH BLD: NORMAL
ABO + RH BLD: NORMAL
CREAT UR-MCNC: 114 MG/DL
PROT UR-MCNC: 0.43 G/L
PROT/CREAT 24H UR: 0.38 G/G CR (ref 0–0.2)
SPECIMEN EXP DATE BLD: NORMAL
T PALLIDUM AB SER QL: NONREACTIVE

## 2018-11-20 PROCEDURE — 72200001 ZZH LABOR CARE VAGINAL DELIVERY SINGLE

## 2018-11-20 PROCEDURE — 37000011 ZZH ANESTHESIA WARD SERVICE: Performed by: NURSE ANESTHETIST, CERTIFIED REGISTERED

## 2018-11-20 PROCEDURE — 25000128 H RX IP 250 OP 636: Performed by: NURSE ANESTHETIST, CERTIFIED REGISTERED

## 2018-11-20 PROCEDURE — 25000128 H RX IP 250 OP 636: Performed by: OBSTETRICS & GYNECOLOGY

## 2018-11-20 PROCEDURE — 25000125 ZZHC RX 250: Performed by: OBSTETRICS & GYNECOLOGY

## 2018-11-20 PROCEDURE — 12000031 ZZH R&B OB CRITICAL

## 2018-11-20 PROCEDURE — 59400 OBSTETRICAL CARE: CPT | Performed by: OBSTETRICS & GYNECOLOGY

## 2018-11-20 PROCEDURE — 40000671 ZZH STATISTIC ANESTHESIA CASE

## 2018-11-20 PROCEDURE — 25000125 ZZHC RX 250: Performed by: NURSE ANESTHETIST, CERTIFIED REGISTERED

## 2018-11-20 PROCEDURE — 25000132 ZZH RX MED GY IP 250 OP 250 PS 637: Performed by: OBSTETRICS & GYNECOLOGY

## 2018-11-20 PROCEDURE — 3E0R3BZ INTRODUCTION OF ANESTHETIC AGENT INTO SPINAL CANAL, PERCUTANEOUS APPROACH: ICD-10-PCS | Performed by: NURSE ANESTHETIST, CERTIFIED REGISTERED

## 2018-11-20 PROCEDURE — 0KQM0ZZ REPAIR PERINEUM MUSCLE, OPEN APPROACH: ICD-10-PCS | Performed by: OBSTETRICS & GYNECOLOGY

## 2018-11-20 PROCEDURE — 00HU33Z INSERTION OF INFUSION DEVICE INTO SPINAL CANAL, PERCUTANEOUS APPROACH: ICD-10-PCS | Performed by: NURSE ANESTHETIST, CERTIFIED REGISTERED

## 2018-11-20 RX ORDER — CARBOPROST TROMETHAMINE 250 UG/ML
250 INJECTION, SOLUTION INTRAMUSCULAR
Status: DISCONTINUED | OUTPATIENT
Start: 2018-11-20 | End: 2018-11-22 | Stop reason: HOSPADM

## 2018-11-20 RX ORDER — DIPHENHYDRAMINE HCL 25 MG
25 CAPSULE ORAL EVERY 6 HOURS PRN
Status: DISCONTINUED | OUTPATIENT
Start: 2018-11-20 | End: 2018-11-21

## 2018-11-20 RX ORDER — LIDOCAINE HCL/EPINEPHRINE/PF 2%-1:200K
VIAL (ML) INJECTION
Status: COMPLETED
Start: 2018-11-20 | End: 2018-11-20

## 2018-11-20 RX ORDER — LANOLIN 100 %
OINTMENT (GRAM) TOPICAL
Status: DISCONTINUED | OUTPATIENT
Start: 2018-11-20 | End: 2018-11-22 | Stop reason: HOSPADM

## 2018-11-20 RX ORDER — FENTANYL CITRATE 50 UG/ML
INJECTION, SOLUTION INTRAMUSCULAR; INTRAVENOUS
Status: COMPLETED
Start: 2018-11-20 | End: 2018-11-20

## 2018-11-20 RX ORDER — IBUPROFEN 800 MG/1
800 TABLET, FILM COATED ORAL EVERY 6 HOURS PRN
Status: DISCONTINUED | OUTPATIENT
Start: 2018-11-20 | End: 2018-11-22 | Stop reason: HOSPADM

## 2018-11-20 RX ORDER — EPHEDRINE SULFATE 50 MG/ML
5 INJECTION, SOLUTION INTRAMUSCULAR; INTRAVENOUS; SUBCUTANEOUS
Status: DISCONTINUED | OUTPATIENT
Start: 2018-11-20 | End: 2018-11-21

## 2018-11-20 RX ORDER — MISOPROSTOL 200 UG/1
800 TABLET ORAL
Status: COMPLETED | OUTPATIENT
Start: 2018-11-20 | End: 2018-11-20

## 2018-11-20 RX ORDER — BUPIVACAINE HYDROCHLORIDE 2.5 MG/ML
INJECTION, SOLUTION EPIDURAL; INFILTRATION; INTRACAUDAL
Status: COMPLETED
Start: 2018-11-20 | End: 2018-11-20

## 2018-11-20 RX ORDER — ONDANSETRON 4 MG/1
4 TABLET, ORALLY DISINTEGRATING ORAL EVERY 6 HOURS PRN
Status: DISCONTINUED | OUTPATIENT
Start: 2018-11-20 | End: 2018-11-21

## 2018-11-20 RX ORDER — OXYTOCIN/0.9 % SODIUM CHLORIDE 30/500 ML
340 PLASTIC BAG, INJECTION (ML) INTRAVENOUS CONTINUOUS PRN
Status: DISCONTINUED | OUTPATIENT
Start: 2018-11-20 | End: 2018-11-22 | Stop reason: HOSPADM

## 2018-11-20 RX ORDER — LIDOCAINE HYDROCHLORIDE AND EPINEPHRINE 15; 5 MG/ML; UG/ML
INJECTION, SOLUTION EPIDURAL PRN
Status: DISCONTINUED | OUTPATIENT
Start: 2018-11-20 | End: 2018-11-21

## 2018-11-20 RX ORDER — MISOPROSTOL 200 UG/1
800 TABLET ORAL
Status: DISCONTINUED | OUTPATIENT
Start: 2018-11-20 | End: 2018-11-22 | Stop reason: HOSPADM

## 2018-11-20 RX ORDER — LIDOCAINE HYDROCHLORIDE 10 MG/ML
INJECTION, SOLUTION INFILTRATION; PERINEURAL PRN
Status: DISCONTINUED | OUTPATIENT
Start: 2018-11-20 | End: 2018-11-21

## 2018-11-20 RX ORDER — MISOPROSTOL 200 UG/1
TABLET ORAL
Status: DISCONTINUED
Start: 2018-11-20 | End: 2018-11-20 | Stop reason: HOSPADM

## 2018-11-20 RX ORDER — FENTANYL CITRATE 50 UG/ML
INJECTION, SOLUTION INTRAMUSCULAR; INTRAVENOUS PRN
Status: DISCONTINUED | OUTPATIENT
Start: 2018-11-20 | End: 2018-11-21

## 2018-11-20 RX ORDER — NALOXONE HYDROCHLORIDE 0.4 MG/ML
.1-.4 INJECTION, SOLUTION INTRAMUSCULAR; INTRAVENOUS; SUBCUTANEOUS
Status: DISCONTINUED | OUTPATIENT
Start: 2018-11-20 | End: 2018-11-21

## 2018-11-20 RX ORDER — ACETAMINOPHEN 325 MG/1
650 TABLET ORAL EVERY 4 HOURS PRN
Status: DISCONTINUED | OUTPATIENT
Start: 2018-11-20 | End: 2018-11-22 | Stop reason: HOSPADM

## 2018-11-20 RX ORDER — AMOXICILLIN 250 MG
1 CAPSULE ORAL 2 TIMES DAILY
Status: DISCONTINUED | OUTPATIENT
Start: 2018-11-20 | End: 2018-11-22 | Stop reason: HOSPADM

## 2018-11-20 RX ORDER — NALOXONE HYDROCHLORIDE 0.4 MG/ML
.1-.4 INJECTION, SOLUTION INTRAMUSCULAR; INTRAVENOUS; SUBCUTANEOUS
Status: DISCONTINUED | OUTPATIENT
Start: 2018-11-20 | End: 2018-11-22 | Stop reason: HOSPADM

## 2018-11-20 RX ORDER — ONDANSETRON 2 MG/ML
4 INJECTION INTRAMUSCULAR; INTRAVENOUS EVERY 6 HOURS PRN
Status: DISCONTINUED | OUTPATIENT
Start: 2018-11-20 | End: 2018-11-21

## 2018-11-20 RX ORDER — BISACODYL 10 MG
10 SUPPOSITORY, RECTAL RECTAL DAILY PRN
Status: DISCONTINUED | OUTPATIENT
Start: 2018-11-22 | End: 2018-11-22 | Stop reason: HOSPADM

## 2018-11-20 RX ORDER — OXYTOCIN/0.9 % SODIUM CHLORIDE 30/500 ML
100 PLASTIC BAG, INJECTION (ML) INTRAVENOUS CONTINUOUS
Status: DISCONTINUED | OUTPATIENT
Start: 2018-11-20 | End: 2018-11-21

## 2018-11-20 RX ORDER — DIPHENHYDRAMINE HYDROCHLORIDE 50 MG/ML
25 INJECTION INTRAMUSCULAR; INTRAVENOUS EVERY 6 HOURS PRN
Status: DISCONTINUED | OUTPATIENT
Start: 2018-11-20 | End: 2018-11-21

## 2018-11-20 RX ORDER — HYDROCORTISONE 2.5 %
CREAM (GRAM) TOPICAL 3 TIMES DAILY PRN
Status: DISCONTINUED | OUTPATIENT
Start: 2018-11-20 | End: 2018-11-22 | Stop reason: HOSPADM

## 2018-11-20 RX ORDER — AMOXICILLIN 250 MG
2 CAPSULE ORAL 2 TIMES DAILY
Status: DISCONTINUED | OUTPATIENT
Start: 2018-11-20 | End: 2018-11-22 | Stop reason: HOSPADM

## 2018-11-20 RX ORDER — LIDOCAINE HYDROCHLORIDE 10 MG/ML
INJECTION, SOLUTION EPIDURAL; INFILTRATION; INTRACAUDAL; PERINEURAL
Status: COMPLETED
Start: 2018-11-20 | End: 2018-11-20

## 2018-11-20 RX ORDER — OXYTOCIN 10 [USP'U]/ML
10 INJECTION, SOLUTION INTRAMUSCULAR; INTRAVENOUS
Status: DISCONTINUED | OUTPATIENT
Start: 2018-11-20 | End: 2018-11-22 | Stop reason: HOSPADM

## 2018-11-20 RX ORDER — LIDOCAINE HCL/EPINEPHRINE/PF 2%-1:200K
VIAL (ML) INJECTION PRN
Status: DISCONTINUED | OUTPATIENT
Start: 2018-11-20 | End: 2018-11-21

## 2018-11-20 RX ORDER — BUPIVACAINE HYDROCHLORIDE 2.5 MG/ML
INJECTION, SOLUTION EPIDURAL; INFILTRATION; INTRACAUDAL PRN
Status: DISCONTINUED | OUTPATIENT
Start: 2018-11-20 | End: 2018-11-21

## 2018-11-20 RX ADMIN — LIDOCAINE HYDROCHLORIDE 9 ML: 10 INJECTION, SOLUTION INFILTRATION; PERINEURAL at 10:43

## 2018-11-20 RX ADMIN — LIDOCAINE HYDROCHLORIDE,EPINEPHRINE BITARTRATE 5 ML: 20; .005 INJECTION, SOLUTION EPIDURAL; INFILTRATION; INTRACAUDAL; PERINEURAL at 14:49

## 2018-11-20 RX ADMIN — SODIUM CHLORIDE, POTASSIUM CHLORIDE, SODIUM LACTATE AND CALCIUM CHLORIDE 500 ML: 600; 310; 30; 20 INJECTION, SOLUTION INTRAVENOUS at 16:46

## 2018-11-20 RX ADMIN — BUPIVACAINE HYDROCHLORIDE 10 ML/HR: 7.5 INJECTION, SOLUTION EPIDURAL; RETROBULBAR at 13:22

## 2018-11-20 RX ADMIN — LIDOCAINE HYDROCHLORIDE,EPINEPHRINE BITARTRATE 5 ML: 20; .005 INJECTION, SOLUTION EPIDURAL; INFILTRATION; INTRACAUDAL; PERINEURAL at 14:54

## 2018-11-20 RX ADMIN — BUPIVACAINE HYDROCHLORIDE 5 ML: 2.5 INJECTION, SOLUTION EPIDURAL; INFILTRATION; INTRACAUDAL at 13:17

## 2018-11-20 RX ADMIN — SODIUM CHLORIDE, POTASSIUM CHLORIDE, SODIUM LACTATE AND CALCIUM CHLORIDE: 600; 310; 30; 20 INJECTION, SOLUTION INTRAVENOUS at 09:17

## 2018-11-20 RX ADMIN — SODIUM CHLORIDE 2.5 MILLION UNITS: 9 INJECTION, SOLUTION INTRAVENOUS at 10:06

## 2018-11-20 RX ADMIN — OXYTOCIN-SODIUM CHLORIDE 0.9% IV SOLN 30 UNIT/500ML 2 MILLI-UNITS/MIN: 30-0.9/5 SOLUTION at 02:22

## 2018-11-20 RX ADMIN — BUPIVACAINE HYDROCHLORIDE 5 ML: 2.5 INJECTION, SOLUTION EPIDURAL; INFILTRATION; INTRACAUDAL at 13:22

## 2018-11-20 RX ADMIN — LIDOCAINE HYDROCHLORIDE,EPINEPHRINE BITARTRATE 5 ML: 20; .005 INJECTION, SOLUTION EPIDURAL; INFILTRATION; INTRACAUDAL; PERINEURAL at 17:28

## 2018-11-20 RX ADMIN — SODIUM BICARBONATE 1 MEQ: 84 INJECTION, SOLUTION INTRAVENOUS at 10:43

## 2018-11-20 RX ADMIN — SODIUM CHLORIDE, POTASSIUM CHLORIDE, SODIUM LACTATE AND CALCIUM CHLORIDE 1000 ML: 600; 310; 30; 20 INJECTION, SOLUTION INTRAVENOUS at 02:22

## 2018-11-20 RX ADMIN — LIDOCAINE HYDROCHLORIDE AND EPINEPHRINE 3 ML: 15; 5 INJECTION, SOLUTION EPIDURAL at 13:12

## 2018-11-20 RX ADMIN — IBUPROFEN 800 MG: 800 TABLET ORAL at 22:20

## 2018-11-20 RX ADMIN — FENTANYL CITRATE 100 MCG: 50 INJECTION, SOLUTION INTRAMUSCULAR; INTRAVENOUS at 13:17

## 2018-11-20 RX ADMIN — OXYTOCIN-SODIUM CHLORIDE 0.9% IV SOLN 30 UNIT/500ML 340 ML/HR: 30-0.9/5 SOLUTION at 19:03

## 2018-11-20 RX ADMIN — SODIUM CHLORIDE, POTASSIUM CHLORIDE, SODIUM LACTATE AND CALCIUM CHLORIDE 1000 ML: 600; 310; 30; 20 INJECTION, SOLUTION INTRAVENOUS at 13:02

## 2018-11-20 RX ADMIN — MISOPROSTOL 800 MCG: 200 TABLET ORAL at 19:18

## 2018-11-20 RX ADMIN — LIDOCAINE HYDROCHLORIDE,EPINEPHRINE BITARTRATE 5 ML: 20; .005 INJECTION, SOLUTION EPIDURAL; INFILTRATION; INTRACAUDAL; PERINEURAL at 17:23

## 2018-11-20 RX ADMIN — SODIUM CHLORIDE 2.5 MILLION UNITS: 9 INJECTION, SOLUTION INTRAVENOUS at 15:40

## 2018-11-20 RX ADMIN — SODIUM CHLORIDE 2.5 MILLION UNITS: 9 INJECTION, SOLUTION INTRAVENOUS at 02:10

## 2018-11-20 RX ADMIN — SODIUM CHLORIDE 2.5 MILLION UNITS: 9 INJECTION, SOLUTION INTRAVENOUS at 06:20

## 2018-11-20 ASSESSMENT — ACTIVITIES OF DAILY LIVING (ADL)
RETIRED_EATING: 0-->INDEPENDENT
SWALLOWING: 0-->SWALLOWS FOODS/LIQUIDS WITHOUT DIFFICULTY
FALL_HISTORY_WITHIN_LAST_SIX_MONTHS: NO
COGNITION: 0 - NO COGNITION ISSUES REPORTED
RETIRED_COMMUNICATION: 0-->UNDERSTANDS/COMMUNICATES WITHOUT DIFFICULTY
TOILETING: 0-->INDEPENDENT
AMBULATION: 0-->INDEPENDENT
TRANSFERRING: 0-->INDEPENDENT
DRESS: 0-->INDEPENDENT
BATHING: 0-->INDEPENDENT

## 2018-11-20 NOTE — ANESTHESIA PREPROCEDURE EVALUATION
Anesthesia Evaluation       history and physical reviewed .             ROS/MED HX    ENT/Pulmonary:  - neg pulmonary ROS     Neurologic:  - neg neurologic ROS     Cardiovascular:  - neg cardiovascular ROS       METS/Exercise Tolerance:     Hematologic:         Musculoskeletal:         GI/Hepatic:  - neg GI/hepatic ROS       Renal/Genitourinary:         Endo:         Psychiatric:         Infectious Disease:         Malignancy:         Other:                     Physical Exam  Normal systems: cardiovascular, pulmonary and dental    Airway   Mallampati: I  TM distance: > 3 FB  Neck ROM: full  Mouth opening: > 3 cm    Dental     Cardiovascular       Pulmonary           neg OB ROS                 Anesthesia Plan      History & Physical Review      ASA Status:  .  OB Epidural Asa: 2            Postoperative Care      Consents  Anesthetic plan, risks, benefits and alternatives discussed with:  Patient..                          .

## 2018-11-20 NOTE — PLAN OF CARE
Problem: Labor (Cervical Ripen, Induct, Augment) (Adult,Obstetrics,Pediatric)  Goal: Signs and Symptoms of Listed Potential Problems Will be Absent, Minimized or Managed (Labor)  Signs and symptoms of listed potential problems will be absent, minimized or managed by discharge/transition of care (reference Labor (Cervical Ripen, Induct, Augment) (Adult,Obstetrics,Pediatric) CPG).   Outcome: Improving  Patient reported increasing pain despite pca administration along with continuous epidural. Called Larry CLARK and requested additional bolus. CRNA at bedside at 1448 to administer bolus and increased rate to 12ml/hr. Following bolus, patient reporting decrease in pain, rating 2-3/10.

## 2018-11-20 NOTE — PROGRESS NOTES
VSS. Denies s/s of PIH. States aware of occ mild contr et has been able to sleep. Category 1 tracing. Continues to leak small amts of clear fluid. Review pitocin augmentation protocol et plan of care. Questions answered. Verbalizes understanding et consent. Pitocin started per protocol.

## 2018-11-20 NOTE — PLAN OF CARE
Problem: Labor (Cervical Ripen, Induct, Augment) (Adult,Obstetrics,Pediatric)  Goal: Signs and Symptoms of Listed Potential Problems Will be Absent, Minimized or Managed (Labor)  Signs and symptoms of listed potential problems will be absent, minimized or managed by discharge/transition of care (reference Labor (Cervical Ripen, Induct, Augment) (Adult,Obstetrics,Pediatric) CPG).   Outcome: No Change  At 1545, started turning patient left to right with use of peanut ball. Baby showing category 2 tracings with deep variables and early decelerations. Turned patient back to left side with no change in fetal patterns. Initiated fluid bolus at 1646 and applied oxygen to patient. With interventions, decelerations appear to be improving. Updated MD.

## 2018-11-20 NOTE — PLAN OF CARE
Problem: Labor (Cervical Ripen, Induct, Augment) (Adult,Obstetrics,Pediatric)  Goal: Signs and Symptoms of Listed Potential Problems Will be Absent, Minimized or Managed (Labor)  Signs and symptoms of listed potential problems will be absent, minimized or managed by discharge/transition of care (reference Labor (Cervical Ripen, Induct, Augment) (Adult,Obstetrics,Pediatric) CPG).   Outcome: Improving  Fetal heart tones assessed and Category 1 tracing noted. Uterine activity assessed and minimal uterine activity noted. Interventions included IV fluid flush and increased oral fluids. Fetal heart tones moderate variability.  . Dr. CHARANJIT Soni notified @ 2100. Plan is to continue to monitor. Patient informed of and agrees with plan of care.

## 2018-11-20 NOTE — H&P
[facility] Labor and Delivery History and Physical    Jennifer Orlando MRN# [id number]   Age: 28 year old YOB: 1990     Date of Admission:  2018    Primary care provider: Philipp Pringle           Chief Complaint:   Jennifer Orlando is a 28 year old female who is 39w0d pregnant and being admitted for SROM.          Pregnancy history:     OBSTETRIC HISTORY:    Obstetric History       T0      L0     SAB0   TAB0   Ectopic0   Multiple0   Live Births0       # Outcome Date GA Lbr Gera/2nd Weight Sex Delivery Anes PTL Lv   2 Current            1 AB 17 10w6d    AB, MISSED             EDC: Estimated Date of Delivery: 18    Prenatal Labs:   Lab Results   Component Value Date    ABO O 2018    RH Pos 2018    AS Neg 2018    HEPBANG Nonreactive 2018    CHPCRT Negative 2018    GCPCRT Negative 2018    TREPAB Negative 2018    HGB 11.5 (L) 2018       GBS Status:   Lab Results   Component Value Date    GBS Positive (A) 2018       Active Problem List  Patient Active Problem List   Diagnosis     Menstrual migraine     Elevated blood pressure reading without diagnosis of hypertension     CARDIOVASCULAR SCREENING; LDL GOAL LESS THAN 160     Obesity     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     ranitidine (ZANTAC) 150 MG tablet Take 1 tablet (150 mg) by mouth 2 times daily 60 tablet 11 Past Month at Unknown time     cetirizine (ZYRTEC) 10 MG tablet Take 2 tablets (20 mg) by mouth 2 times  daily (Patient not taking: Reported on 4/3/2018) 120 tablet 11 More than a month at Unknown time   .        Maternal Past Medical History:     Past Medical History:   Diagnosis Date     Chickenpox      Obese      Otitis media     Recurrent otitis                       Family History:     Family History   Problem Relation Age of Onset     Hypertension Mother      Diabetes Mother      Diabetes Maternal Grandmother      Hypertension Maternal Grandmother      Hypertension Maternal Grandfather      Autoimmune Disease Maternal Grandfather      lupus     Hypertension Paternal Grandmother      HEART DISEASE Paternal Grandmother      stroke     Cancer - colorectal Paternal Grandfather      in his 70's     GASTROINTESTINAL DISEASE Brother      stomach ulcer     Family history reviewed and updated in [facility]            Social History:     Social History   Substance Use Topics     Smoking status: Never Smoker     Smokeless tobacco: Never Used     Alcohol use No      Comment: occas.- quit with pregnancy            Review of Systems:   The Review of Systems is negative other than noted in the HPI          Physical Exam:   Vitals were reviewed  /80                     Constitutional:   awake, alert, cooperative, no apparent distress, and appears stated age     Lungs:   No increased work of breathing, good air exchange, clear to auscultation bilaterally, no crackles or wheezing     Cardiovascular:   normal S1 and S2      Cervix:   Membranes: SROM   Dilation: 2   Effacement: 70%   Station:-2   Consistency: soft   Position: Posterior  Presentation:Cephalic  Fetal Heart Rate Tracing: reactive and reassuring  Tocometer: external monitor                       Assessment:   Jennifer Orlando is a 39w0d pregnant female admitted with SROM.          Plan:   Admit - see IP orders  Labor augmentation with Pitocin as needed.  Discussed with Jennifer Orlando, the following; indications; the agents and methods of labor augmentation, including risks,  benefits, and alternative approaches; and the possible need for  birth. EFW is AGA.    The Labor Induction:what you need to know information sheet was made available to her. Questions and concerns were addressed and patient agrees to above if necessary during the course of her labor.    Epidural planned  PCN for GBS  Hypertension: PI labs ordered    Triny Soni MD

## 2018-11-20 NOTE — PLAN OF CARE
Problem: Labor (Cervical Ripen, Induct, Augment) (Adult,Obstetrics,Pediatric)  Goal: Signs and Symptoms of Listed Potential Problems Will be Absent, Minimized or Managed (Labor)  Signs and symptoms of listed potential problems will be absent, minimized or managed by discharge/transition of care (reference Labor (Cervical Ripen, Induct, Augment) (Adult,Obstetrics,Pediatric) CPG).   Patient requesting epidural be placed. Larry Abdi CRNA notified, consent signed.

## 2018-11-20 NOTE — PLAN OF CARE
Problem: Labor (Cervical Ripen, Induct, Augment) (Adult,Obstetrics,Pediatric)  Goal: Signs and Symptoms of Listed Potential Problems Will be Absent, Minimized or Managed (Labor)  Signs and symptoms of listed potential problems will be absent, minimized or managed by discharge/transition of care (reference Labor (Cervical Ripen, Induct, Augment) (Adult,Obstetrics,Pediatric) CPG).   Patient requesting epidural to be dosed, CRNA Paged.

## 2018-11-20 NOTE — PROGRESS NOTES
P:0. 39+0 wks. Admits to  per ambulatory c/o SROM @ ~ 1430 18. States experienced several small trickles of clear fluid. States aware of occ mild abd cramping. Denies vaginal bleeding et any s/s of PIH. + FM. Oriented to room et plan of care. External monitors applied. IV started in left wrist per protocol. Infusing per pump. Category 1/reactive tracing obtained et verified w/S Rafaela RN. ROM+ obtained per protocol. Small amt clear fluid returns on glove. Cervix posterior et presenting part not well applied. Dr Soni visits w/pt et SO et discuss plan of care. Orders received. Pt verbalizes understanding.

## 2018-11-20 NOTE — ANESTHESIA PROCEDURE NOTES
Peripheral nerve/Neuraxial procedure note : epidural catheter  Pre-Procedure  Performed by  SNOW RAINES   Location: OB    Procedure Times:11/20/2018 10:42 AM and 11/20/2018 10:54 AM  Pre-Anesthestic Checklist: patient identified, IV checked, risks and benefits discussed, informed consent, monitors and equipment checked, pre-op evaluation and at physician/surgeon's request    Timeout  Correct Patient: Yes   Correct Procedure: Yes   Correct Site: Yes   Correct Laterality: N/A   Correct Position: Yes   Site Marked: N/A   .   Procedure Documentation    Diagnosis:Active labor.    Procedure:    Epidural catheter.  Insertion Site:L2-3  (midline approach) Injection technique: LORT saline   Local skin infiltrated with 9 mL of 1% lidocaine.  BENNY at 8 cm     Patient Prep;mask, sterile gloves, patient draped.  .  Needle: Touhy needle Needle Gauge: 17.    Needle Length (Inches) 3.5  # of attempts: 1 and # of redirects:  .   Catheter: 19 G . .  Catheter threaded easily  4 cm epidural space.  12 cm at skin.   .    Assessment/Narrative  Paresthesias: No.  .  .  Aspiration negative for heme or CSF  . Test dose of 3 mL lidocaine 1.5% w/ 1:200,000 epinephrine at 13:12.  Test dose negative for signs of intravascular, subdural or intrathecal injection. Comments:  VAS pain score prior to epidural:  7-8    VAS pain score after epidural:  5    Pt. Tolerated well, FHR stable.

## 2018-11-20 NOTE — PLAN OF CARE
Problem: Labor (Cervical Ripen, Induct, Augment) (Adult,Obstetrics,Pediatric)  Goal: Signs and Symptoms of Listed Potential Problems Will be Absent, Minimized or Managed (Labor)  Signs and symptoms of listed potential problems will be absent, minimized or managed by discharge/transition of care (reference Labor (Cervical Ripen, Induct, Augment) (Adult,Obstetrics,Pediatric) CPG).   Outcome: Improving  Fetal heart tones assessed and Category 1 tracing noted. Uterine activity assessed and normal uterine activity noted. Interventions included position change, increased oral fluids and oxytocin decreased. Fetal heart tones moderate variability.  . Dr. ABDIRIZAK Swanson notified @ 0700. Plan is to continue to monitor. Patient informed of and agrees with plan of care. Pt states is becoming more uncomfortable with contractions. Support offered. Discuss comfort options. Questions answered. Denies s/s of PIH. SO @ side.

## 2018-11-20 NOTE — PROGRESS NOTES
Intrapartum Progress Note    S: In to meet patient.  Contractions starting to hurt.    O:  Vitals:    18 0735 18 0820 18 0830 18 0918   BP: 132/79  134/79 133/62   BP Location: Right arm      Pulse: 81      Resp: 18      Temp: 99.1  F (37.3  C) 97.6  F (36.4  C)  97.9  F (36.6  C)   TempSrc: Oral        General: comfortable, eating and sitting on birthing ball  Cervix: 2.5/70/-2    A/P: 28 year old  @ 39w1d here for SROM, concern for cHTN with normal BP here.  - Labor: SROM, pitocin.  - Pain management: epidural when desired  - GBS pos, s/p 4h PCN  - Anticipate     Esther Swanson MD  [facility] OB/Gyn

## 2018-11-20 NOTE — PLAN OF CARE
Problem: Labor (Cervical Ripen, Induct, Augment) (Adult,Obstetrics,Pediatric)  Goal: Signs and Symptoms of Listed Potential Problems Will be Absent, Minimized or Managed (Labor)  Signs and symptoms of listed potential problems will be absent, minimized or managed by discharge/transition of care (reference Labor (Cervical Ripen, Induct, Augment) (Adult,Obstetrics,Pediatric) CPG).   Outcome: Improving  Epidural dosed, patient reporting significant relief from pain. Resting comfortably now. VS stable.

## 2018-11-21 LAB — HGB BLD-MCNC: 9.6 G/DL (ref 11.7–15.7)

## 2018-11-21 PROCEDURE — 12000027 ZZH R&B OB

## 2018-11-21 PROCEDURE — 25000132 ZZH RX MED GY IP 250 OP 250 PS 637: Performed by: OBSTETRICS & GYNECOLOGY

## 2018-11-21 PROCEDURE — 85018 HEMOGLOBIN: CPT | Performed by: OBSTETRICS & GYNECOLOGY

## 2018-11-21 PROCEDURE — 36415 COLL VENOUS BLD VENIPUNCTURE: CPT | Performed by: OBSTETRICS & GYNECOLOGY

## 2018-11-21 PROCEDURE — 12000031 ZZH R&B OB CRITICAL

## 2018-11-21 RX ADMIN — IBUPROFEN 800 MG: 800 TABLET ORAL at 10:38

## 2018-11-21 RX ADMIN — IBUPROFEN 800 MG: 800 TABLET ORAL at 17:24

## 2018-11-21 RX ADMIN — IBUPROFEN 800 MG: 800 TABLET ORAL at 04:21

## 2018-11-21 RX ADMIN — SENNOSIDES AND DOCUSATE SODIUM 1 TABLET: 8.6; 5 TABLET ORAL at 08:15

## 2018-11-21 RX ADMIN — IBUPROFEN 800 MG: 800 TABLET ORAL at 23:50

## 2018-11-21 NOTE — PROVIDER NOTIFICATION
11/20/18 2259   Provider Notification   Provider Name/Title Kiki   Method of Notification Phone   Notification Reason SVE;Status Update;Bleeding;Uterine Activity       Updated Dr Swanson. Pt having some bleeding and small clots. Abdomen soft in between contractions, Contractions every 3 minutes. FHT category 1, + accels, - decels, moderate variability.     MD offered AROM, pt declined.     Carola Blackman RN 11/20/2018 10:57 PM

## 2018-11-21 NOTE — PLAN OF CARE
Problem: Patient Care Overview  Goal: Plan of Care/Patient Progress Review  Outcome: Improving  S:Delivery  B:Augmented  Labor,39w1d    Lab Results   Component Value Date    GBS Positive (A) 2018    with antibiotic treatment greater than or equal to 4 hours prior to delivery.  A: Patient delivered   lac 2nd degree at 1900 with Dr. ABDIRIZAK Swanson in attendance and baby placed on mother's abdomen for delayed cord clamping. Baby dried and stimulated. Baby placed  skin to skin @ 1902. Apgars 9/9.  IV infusion of Oxytocin  infused. Placenta removal spontaneous. See Flowsheet for VS and PP checks. Labor care plan goals met, transition now to postpartum care.  R: Expect routine postpartum care. Anticipate first feeding within the hour or whenever infant displays feeding cues. Continue skin to skin. Prior discussion with mother indicates that feeding plan is Breast feeding . Educated mother on importance of exclusive breastfeeding, expected feeding readiness cues and encouraged her to observe for these cues while rooming in. Informed her that breastfeeding assistance would be provided.

## 2018-11-21 NOTE — L&D DELIVERY NOTE
"Delivery Summary    28 year old  at 39w1d with cHTN admitted with SROM.  GBS pos, s/p PCN >4h prior to delivery.  Pushed 1 hour.  Delivered a vigorous female infant with apgars 9 and 9 in OA position.  Delayed cord clamping.  Placenta spontaneous intact with 3VC.  2nd degree perineal laceration repaired with 3-0 vicryl.  Left vaginal/hymenal laceration bleeding and repaired with 3-0 vicryl.  .  Uterus slightly boggy, cytotec 800mg VT given.  \"Margarita\"    Esther Swanson MD, MPH  [facility]          Labor Event Times    Dilation complete date:  18 Complete time:   5:45 PM   Start pushing date/time:  2018 1749            Labor Events     labor?:  No   Labor Type:  Spontaneous, Augmentation   Predominate monitoring during 1st stage:  intermittent electronic fetal monitoring      Antibiotics received during labor?:  Yes   Reason for Antibiotics:  GBS   Antibiotics received for GBS:  Penicillin   Antibiotics Given (GBS):  Greater than 4 hours prior to delivery      Rupture date/time: 18 1430   Rupture type:  Spontaneous rupture of membranes occuring during spontaneous labor or augmentation   Fluid color:  Clear   Fluid odor:  Normal      Augmentation:  Oxytocin   Indications for augmentation:  Prolonged ROM   1:1 continuous labor support provided by?:  RN Labor partogram used?:  no         Delivery/Placenta Date and Time    Delivery Date:  18 Delivery Time:   7:00 PM   Placenta Date/Time:  2018  7:04 PM   Oxytocin given at the time of delivery:  after delivery of baby      Vaginal Counts    Initial count performed by 2 team members:   Two Team Members   Latasha BARROS          Needles Suture [place] [employer]   Initial counts 2  5    Added to count  1     Final counts 2 1 5       Placed during labor Accounted for at the end of labor      Final count performed by 2 team members:   Two Team Members   Blanche Cormier         Final " count correct?:  Yes         Apgars    Living status:  Living    1 Minute 5 Minute 10 Minute 15 Minute 20 Minute   Skin color: 1  1       Heart rate: 2  2       Reflex irritability: 2  2       Muscle tone: 2  2       Respiratory effort: 2  2       Total: 9  9          Apgars assigned by:  CARLOS CORONA      Cord    Vessels:  3 Vessels    Cord Blood Disposition:  Lab          [place] Resuscitation    Methods:  None         Skin to Skin and Feeding Plan    Skin to skin initiation date/time: 18 1902   Skin to skin with:  Mother   Skin to skin end date/time:     How do you plan to feed your baby:  Breastfeeding      Labor Events and Shoulder Dystocia    Fetal Tracing Prior to Delivery:  Category 2   Shoulder dystocia present?:  Neg            Delivery (Maternal) (Provider to Complete) ([id number])    Episiotomy:  None   Perineal lacerations:  2nd Repaired?:  Yes   Vaginal laceration?:  Yes Repaired?:  Yes         Mother's Information  Mother: Jennifer Orlando #[id number]    Start of Mother's Information     IO Blood Loss  18 0700 - 18 1934    Mom's I/O Activity            End of Mother's Information  Mother: Jennifer Orlando #[id number]            Delivery - Provider to Complete ([id number])    Delivering clinician:  ESTHER SWANSON   Attempted Delivery Types (Choose all that apply):  Spontaneous Vaginal Delivery   Delivery Type (Choose the 1 that will go to the Birth History):  Vaginal, Spontaneous Delivery                     Other personnel:   Provider Role   GARRETT DAWSON, CARLOS DEXTER             Placenta    Delayed Cord Clamping:  Done   Date/Time:  2018  7:04 PM   Removal:  Spontaneous   Disposition:  Hospital disposal      Anesthesia    Method:  Spinal, Epidural   Cervical dilation at placement:  0-3         Presentation and Position    Presentation:  Vertex    Occiput Anterior                    Esther Swanson MD

## 2018-11-21 NOTE — PLAN OF CARE
Problem: Patient Care Overview  Goal: Individualization & Mutuality  Outcome: Improving  Data: Vital signs within normal limits. Postpartum checks within normal limits - see flow record. Patient eating and drinking normally. Patient able to empty bladder independently. . Patient ambulating independently..   No apparent signs of infection. Lac 2nd degree healing well. Patient Is performing self cares and Is able to care for infant. Positive attachment behaviors are observed with infant. Support persons are present.  Action:  Pain plan was discussed. Patient will request pain med when she is ready for it. Patient was medicated during the shift for cramping. See MAR.Patient education done about breastfeeding,  cares, postpartum cares and pain management/plan. See flow record.  Response:   Patient reassessed within 1 hour after each medication for pain. Patient stated that pain had improved. Patient stated that she was comfortable. .   Plan: Anticipate discharge on 18.

## 2018-11-21 NOTE — PLAN OF CARE
Problem: Labor (Cervical Ripen, Induct, Augment) (Adult,Obstetrics,Pediatric)  Goal: Signs and Symptoms of Listed Potential Problems Will be Absent, Minimized or Managed (Labor)  Signs and symptoms of listed potential problems will be absent, minimized or managed by discharge/transition of care (reference Labor (Cervical Ripen, Induct, Augment) (Adult,Obstetrics,Pediatric) CPG).   Outcome: Improving  Patient complete and started pushing at 1749. Following 30 minutes of pushing, patient requesting to rest and labor down. Currently +1. FHT baseline 125, moderate variability and intermittent variable decelerations.

## 2018-11-21 NOTE — PLAN OF CARE
Problem: Postpartum (Vaginal Delivery) (Adult,Obstetrics,Pediatric)  Goal: Signs and Symptoms of Listed Potential Problems Will be Absent, Minimized or Managed (Postpartum)  Signs and symptoms of listed potential problems will be absent, minimized or managed by discharge/transition of care (reference Postpartum (Vaginal Delivery) (Adult,Obstetrics,Pediatric) CPG).   Outcome: Improving  Data: Vital signs within normal limits. Postpartum checks within normal limits - see flow record. Patient eating and drinking normally. Patient able to empty bladder independently. Patient ambulating independently. No apparent signs of infection. Lac, 2nd degree, healing well. Patient is performing self cares and is able to care for infant. Breastfeeding independently. Positive attachment behaviors are observed with infant. Support persons are present.  Action:  Pain plan was discussed. Patient would like pain meds to be brought in when they are due. Patient was medicated during the shift for pain. See MAR. Patient education done about breastfeeding,  cares, postpartum cares, pain management/plan, fall risk,  safety and rest. See flow record.  Response:   Patient reassessed within 1 hour after each medication for pain. Patient stated that pain had improved. Patient stated that she was comfortable.  Plan: Anticipate discharge on 18.

## 2018-11-21 NOTE — PLAN OF CARE
Mother and baby transferred to postpartum unit at 2215 via ambulatory and bassinet. Patient oriented to room. Mother and baby bonding well and in stable condition upon transfer.

## 2018-11-21 NOTE — ANESTHESIA POSTPROCEDURE EVALUATION
Patient: Jennifer Orlando    * No procedures listed *    Diagnosis:* No pre-op diagnosis entered *  Diagnosis Additional Information: No value filed.    Anesthesia Type:  No value filed.    Note:  Anesthesia Post Evaluation    Patient location during evaluation: Floor  Patient participation: Able to fully participate in evaluation  Level of consciousness: awake and alert  Pain management: adequate  Airway patency: patent  Cardiovascular status: acceptable and hemodynamically stable  Respiratory status: acceptable, room air and spontaneous ventilation  Hydration status: acceptable  PONV: none     Anesthetic complications: None          Last vitals:  Vitals:    11/20/18 2117 11/21/18 0000 11/21/18 0422   BP: 130/63 122/69 122/72   Pulse:  95 85   Resp:  16 16   Temp:  36.9  C (98.4  F) 36.8  C (98.2  F)   SpO2:            Electronically Signed By: IRINA Moreira CRNA  November 21, 2018  8:01 AM

## 2018-11-21 NOTE — PROGRESS NOTES
Postpartum progress note  2018     S: Doing well, got some sleep last night. Pain is pretty well controlled when staying on top of po pain meds. Lochia minimal to moderate. Ambulating without difficulty. Voiding without difficulty. Passing flatus, no BM yet.  Tolerating po intake.     PHYSICAL EXAM:   Vitals:    18 2117 18 0000 18 0422 18 0900   BP: 130/63 122/69 122/72 133/52   BP Location:       Pulse:  95 85 96   Resp:  16  18   Temp:  98.4  F (36.9  C) 98.2  F (36.8  C) 98.1  F (36.7  C)   TempSrc:  Oral Oral Oral   SpO2:           General: sitting up, alert and cooperative  Abd: soft, non-distended, non-tender. Fundus firm, nontender, 2 cm below umbilicus.   Extremities: calves nontender, 1+ edema of lower extremities bilaterally    Lab Results   Component Value Date    HGB 9.6 2018    HGB 11.3 2018     Blood type:   Lab Results   Component Value Date    RH Pos 2018         ASSESSMENT: 28 year old  PPD 1 s/p .     PLAN:  - ?cHTN - BPs normal now, plan for 1 week postpartum BP check  - Heme: 11.3 > 9.6, no s/s ABLA  - Feeding: breast, going well  - Birth control: considering LARC   - Rh status: pos, Rhogam not indicated   - Rubella status: immune  - Dispo: home tomorrow    Esther Swanson MD, MPH  [facility]

## 2018-11-21 NOTE — ANESTHESIA CARE TRANSFER NOTE
Patient: Jennifer Orlando    * No procedures listed *    Diagnosis: * No pre-op diagnosis entered *  Diagnosis Additional Information: No value filed.    Anesthesia Type:   No value filed.     Note:  Airway :Room Air  Patient transferred to:Labor and Delivery  Handoff Report: Identifed the Patient, Identified the Reponsible Provider, Reviewed the pertinent medical history, Discussed the surgical course, Reviewed Intra-OP anesthesia mangement and issues during anesthesia, Set expectations for post-procedure period and Allowed opportunity for questions and acknowledgement of understanding      Vitals: (Last set prior to Anesthesia Care Transfer)              Electronically Signed By: IRINA Moreira CRNA  November 21, 2018  8:01 AM

## 2018-11-22 VITALS
TEMPERATURE: 98 F | SYSTOLIC BLOOD PRESSURE: 128 MMHG | HEART RATE: 80 BPM | DIASTOLIC BLOOD PRESSURE: 71 MMHG | OXYGEN SATURATION: 97 % | RESPIRATION RATE: 16 BRPM

## 2018-11-22 PROCEDURE — 25000132 ZZH RX MED GY IP 250 OP 250 PS 637: Performed by: OBSTETRICS & GYNECOLOGY

## 2018-11-22 RX ADMIN — IBUPROFEN 800 MG: 800 TABLET ORAL at 14:17

## 2018-11-22 RX ADMIN — SENNOSIDES AND DOCUSATE SODIUM 1 TABLET: 8.6; 5 TABLET ORAL at 07:46

## 2018-11-22 RX ADMIN — IBUPROFEN 800 MG: 800 TABLET ORAL at 07:46

## 2018-11-22 NOTE — PLAN OF CARE
Problem: Patient Care Overview  Goal: Individualization & Mutuality  Patient discharged to boarder status due to infant staying for feeding issues and jaundice.  Discharge instructions given. Encouraged to call for any problems, questions or concerns. RXs none.

## 2018-11-22 NOTE — DISCHARGE INSTRUCTIONS
Postpartum Vaginal Delivery Instructions    Activity       Ask family and friends for help when you need it.    Do not place anything in your vagina for 6 weeks.    You are not restricted on other activities, but take it easy for a few weeks to allow your body to recover from delivery.  You are able to do any activities you feel up to that point.    No driving until you have stopped taking your pain medications (usually two weeks after delivery).     Call your health care provider if you have any of these symptoms:       Increased pain, swelling, redness, or fluid around your stiches from an episiotomy or perineal tear.    A fever above 100.4 F (38 C) with or without chills when placing a thermometer under your tongue.    You soak a sanitary pad with blood within 1 hour, or you see blood clots larger than a golf ball.    Bleeding that lasts more than 6 weeks.    Vaginal discharge that smells bad.    Severe pain, cramping or tenderness in your lower belly area.    A need to urinate more frequently (use the toilet more often), more urgently (use the toilet very quickly), or it burns when you urinate.    Nausea and vomiting.    Redness, swelling or pain around a vein in your leg.    Problems breastfeeding or a red or painful area on your breast.    Chest pain and cough or are gasping for air.    Problems coping with sadness, anxiety, or depression.  If you have any concerns about hurting yourself or the baby, call your provider immediately.     You have questions or concerns after you return home.     Keep your hands clean:  Always wash your hands before touching your perineal area and stitches.  This helps reduce your risk of infection.  If your hands aren't dirty, you may use an alcohol hand-rub to clean your hands. Keep your nails clean and short.     For problems or questions with breastfeeding after discharge call: [phone] at the Peds clinic.  After hours you may leave a message and your call will returned  the next morning. You may also call the Birthplace to answer questions, [phone].    If you you feel sad, have difficulty sleeping, feel overwhelmed, anxious or have troubling thoughts you may call your clinic, or the HelpLine for Pregnancy & Postpartum Support [place]. ([facility] HelpLine 281-797-9034) or online resource list  @ www.ppsupport[place].org

## 2018-11-22 NOTE — PROGRESS NOTES
[facility] Obstetrics Post-Partum Progress Note          Assessment and Plan:    Assessment:   Post-partum day #2  Normal spontaneous vaginal delivery  L&D complications: none      Doing well.  No excessive bleeding  Pain well-controlled.      Plan:   Discharge home or to boarder status           Interval History:   Doing well.  Pain is well-controlled.  No fevers.  No history of foul-smelling vaginal discharge.  Good appetite.  Denies chest pain, shortness of breath, nausea or vomiting.  Vaginal bleeding is similar to a heavy menstrual flow.  Ambulatory.  Breastfeeding well.          Significant Problems:    Active Problems:    GBS (group B Streptococcus carrier), +RV culture, currently pregnant    Prenatal care, subsequent pregnancy in third trimester     (spontaneous vaginal delivery)            Review of Systems:    The patient denies any chest pain, shortness of breath, excessive pain, fever, chills, purulent drainage from the wound, nausea or vomiting.          Medications:   All medications related to the patient's surgery have been reviewed          Physical Exam:   All vitals stable  Uterine fundus is firm, non-tender and at the level of the umbilicus          Data:     All laboratory data related to this surgery reviewed  Hemoglobin   Date Value Ref Range Status   2018 9.6 (L) 11.7 - 15.7 g/dL Final   2018 11.3 (L) 11.7 - 15.7 g/dL Final   2018 11.5 (L) 11.7 - 15.7 g/dL Final   2018 12.2 11.7 - 15.7 g/dL Final   2018 12.6 11.7 - 15.7 g/dL Final     No imaging studies have been ordered    Mira Garcia MD

## 2018-11-22 NOTE — PLAN OF CARE
Problem: Patient Care Overview  Goal: Plan of Care/Patient Progress Review  Outcome: Improving  Patient progressing well.  Ambulating, Eating and voiding well. Independent with mother/baby cares. Bonding well with infant.  Breast feeding is going well, baby is latching well and feeding for good lengths of time. Patient rates  Back/epidural site Pain comfortably manageable.  Taking Ibuprofen when due with good relief.  Made plan with patient to bring pain medication when due. Patient encouraged to report increased bleeding or clots.       Problem: Postpartum (Vaginal Delivery) (Adult,Obstetrics,Pediatric)  Goal: Signs and Symptoms of Listed Potential Problems Will be Absent, Minimized or Managed (Postpartum)  Signs and symptoms of listed potential problems will be absent, minimized or managed by discharge/transition of care (reference Postpartum (Vaginal Delivery) (Adult,Obstetrics,Pediatric) CPG).   Outcome: Improving  Patient's pain well controlled with Ibuprofen.  Having back Epidural pain and also slight perineum pain which patient reports comfortably manageable.  Patient stepping up infant feeding plan due to infant bilirubin.  Patient started pumping to supplement infant breast milk after breast feedings.  Patient set up and educated on how to pump and clean and sterilize pumping equipment.  Patient demonstrated correctly.  Patient also taught how to finger feed infant with supplementation. Patient demonstrated correctly.

## 2018-11-22 NOTE — DISCHARGE SUMMARY
[facility] Discharge Summary    Jennifer Orlando MRN# [id number]   Age: 28 year old YOB: 1990     Date of Admission:  2018  Date of Discharge::  2018  Admitting Physician:  Esther Swanson MD  Discharge Physician:  Mira Garcia MD     Home clinic: [facility]          Admission Diagnoses:   pregnancy  Prenatal care, subsequent pregnancy in third trimester  Chronic hypertension          Discharge Diagnosis:   Normal spontaneous vaginal delivery  Intrauterine pregnancy at 39 weeks gestation          Procedures:   Procedure(s): No additional procedures performed       No other procedures performed during this admission           Medications Prior to Admission:     Prescriptions Prior to Admission   Medication Sig Dispense Refill Last Dose     Prenatal Vit-Fe Fumarate-FA (PRENATAL MULTIVITAMIN PLUS IRON) 27-0.8 MG TABS per tablet Take 1 tablet by mouth daily   2018 at am     ranitidine (ZANTAC) 150 MG tablet Take 1 tablet (150 mg) by mouth 2 times daily 60 tablet 11 Past Month at Unknown time             Discharge Medications:     Current Discharge Medication List      CONTINUE these medications which have NOT CHANGED    Details   Prenatal Vit-Fe Fumarate-FA (PRENATAL MULTIVITAMIN PLUS IRON) 27-0.8 MG TABS per tablet Take 1 tablet by mouth daily      ranitidine (ZANTAC) 150 MG tablet Take 1 tablet (150 mg) by mouth 2 times daily  Qty: 60 tablet, Refills: 11    Associated Diagnoses: Erythema multiforme                   Consultations:   No consultations were requested during this admission          Brief History of Labor:   28 year old  at 39w1d with cHTN admitted with SROM.  GBS pos, s/p PCN >4h prior to delivery.  Pushed 1 hour.  Delivered a vigorous female infant with apgars 9 and 9 in OA position.  Delayed cord clamping.  Placenta spontaneous intact with 3VC.  2nd degree perineal laceration repaired with 3-0 vicryl.  Left vaginal/hymenal  "laceration bleeding and repaired with 3-0 vicryl.  .  Uterus slightly boggy, cytotec 800mg OR given.  \"Margarita\"              Hospital Course:   The patient's hospital course was unremarkable.  On discharge, her pain was well controlled. Vaginal bleeding is similar to peak menstrual flow.  Voiding without difficulty.  Ambulating well and tolerating a normal diet.  No fever.  Breastfeeding well.  Infant is stable.  No bowel movement yet.*  She was discharged on post-partum day #2.    Post-partum hemoglobin:   Hemoglobin   Date Value Ref Range Status   11/21/2018 9.6 (L) 11.7 - 15.7 g/dL Final             Discharge Instructions and Follow-Up:   Discharge diet: Regular   Discharge activity: Pelvic rest: abstain from intercourse and do not use tampons for 6 week(s)   Discharge follow-up: Follow up with OB in 6 weeks   Wound care: Drink plenty of fluids           Discharge Disposition:   Discharged to home      Attestation:  I have reviewed today's vital signs, notes, medications, labs and imaging.    Mira Garcia MD     "

## 2018-12-04 ENCOUNTER — TELEPHONE (OUTPATIENT)
Dept: OBGYN | Facility: CLINIC | Age: 28
End: 2018-12-04

## 2018-12-04 NOTE — TELEPHONE ENCOUNTER
Forms were signed and faxed then sent to be scanned and copy at .    Brittany Mcgee  Clinic [facility]

## 2018-12-04 NOTE — TELEPHONE ENCOUNTER
Reason for Call:  Form, our goal is to have forms completed with 72 hours, however, some forms may require a visit or additional information.    Type of letter, form or note:  FMLA    Who is the form from?: Patient    Where did the form come from: Patient or family brought in       What clinic location was the form placed at?: [facility]    Where the form was placed: Given to physician    What number is listed as a contact on the form?: [phone]       Additional comments: forms are also for pt's     Call taken on 12/4/2018 at 12:28 PM by Brittany Mcgee

## 2018-12-10 ENCOUNTER — TELEPHONE (OUTPATIENT)
Dept: OBGYN | Facility: CLINIC | Age: 28
End: 2018-12-10

## 2018-12-31 ENCOUNTER — PRENATAL OFFICE VISIT (OUTPATIENT)
Dept: OBGYN | Facility: CLINIC | Age: 28
End: 2018-12-31
Payer: COMMERCIAL

## 2018-12-31 VITALS
TEMPERATURE: 98.3 F | BODY MASS INDEX: 35.52 KG/M2 | RESPIRATION RATE: 18 BRPM | SYSTOLIC BLOOD PRESSURE: 128 MMHG | WEIGHT: 221 LBS | HEIGHT: 66 IN | HEART RATE: 88 BPM | DIASTOLIC BLOOD PRESSURE: 71 MMHG

## 2018-12-31 DIAGNOSIS — Z30.430 ENCOUNTER FOR INSERTION OF INTRAUTERINE CONTRACEPTIVE DEVICE: ICD-10-CM

## 2018-12-31 PROBLEM — O99.820 GBS (GROUP B STREPTOCOCCUS CARRIER), +RV CULTURE, CURRENTLY PREGNANT: Status: RESOLVED | Noted: 2018-11-05 | Resolved: 2018-12-31

## 2018-12-31 PROBLEM — Z97.5 IUD (INTRAUTERINE DEVICE) IN PLACE: Status: ACTIVE | Noted: 2018-12-31

## 2018-12-31 PROBLEM — Z34.83 PRENATAL CARE, SUBSEQUENT PREGNANCY IN THIRD TRIMESTER: Status: RESOLVED | Noted: 2018-11-19 | Resolved: 2018-12-31

## 2018-12-31 PROBLEM — Z34.00 PRENATAL CARE, FIRST PREGNANCY: Status: RESOLVED | Noted: 2018-04-03 | Resolved: 2018-12-31

## 2018-12-31 PROCEDURE — 99207 ZZC POST PARTUM EXAM: CPT | Performed by: OBSTETRICS & GYNECOLOGY

## 2018-12-31 PROCEDURE — 58300 INSERT INTRAUTERINE DEVICE: CPT | Performed by: OBSTETRICS & GYNECOLOGY

## 2018-12-31 ASSESSMENT — ANXIETY QUESTIONNAIRES
6. BECOMING EASILY ANNOYED OR IRRITABLE: NOT AT ALL
7. FEELING AFRAID AS IF SOMETHING AWFUL MIGHT HAPPEN: SEVERAL DAYS
IF YOU CHECKED OFF ANY PROBLEMS ON THIS QUESTIONNAIRE, HOW DIFFICULT HAVE THESE PROBLEMS MADE IT FOR YOU TO DO YOUR WORK, TAKE CARE OF THINGS AT HOME, OR GET ALONG WITH OTHER PEOPLE: SOMEWHAT DIFFICULT
3. WORRYING TOO MUCH ABOUT DIFFERENT THINGS: SEVERAL DAYS
5. BEING SO RESTLESS THAT IT IS HARD TO SIT STILL: NOT AT ALL
1. FEELING NERVOUS, ANXIOUS, OR ON EDGE: SEVERAL DAYS
GAD7 TOTAL SCORE: 3
2. NOT BEING ABLE TO STOP OR CONTROL WORRYING: NOT AT ALL

## 2018-12-31 ASSESSMENT — PATIENT HEALTH QUESTIONNAIRE - PHQ9
5. POOR APPETITE OR OVEREATING: NOT AT ALL
SUM OF ALL RESPONSES TO PHQ QUESTIONS 1-9: 3

## 2018-12-31 ASSESSMENT — MIFFLIN-ST. JEOR: SCORE: 1754.2

## 2018-12-31 NOTE — PROGRESS NOTES
"Jennifer is a 28 year old female 6 weeks S/P  of a liveborn baby girl.  The delivery was uncomplicated.  She is not still bleeding.  She is breastfeeding, and has selected IUD for birth control.  Her last PAP smear was , and was Normal; her  PHQ-9 inventory score is: 3    Exam: /71 (BP Location: Right arm, Patient Position: Chair, Cuff Size: Adult Large)   Pulse 88   Temp 98.3  F (36.8  C) (Tympanic)   Resp 18   Ht 1.676 m (5' 6\")   Wt 100.2 kg (221 lb)   LMP 2018   Breastfeeding? Yes   BMI 35.67 kg/m    perineal laceration healed, cervix parous, uterus small, non-tender, no adnexal masses and normal lochia    Procedure note:The patient was given informed consent which was signed and dated.  The patient was placed in a supine position and a speculum placed with the vagina, to visualize the cervix.  It was prepped with iodine and the anterior cervix grasped with a tenaculum.  The cervix was sounded and the Mirena  IUD placed in the cavity at the fundus. The string was trimmed 2-3cm from the external cervical os.  A post-procedure ultrasound was not performed to assure the IUD was in place in the uterine cavity.  The patient was given post-procedure instructions and left the clinic in stable condition.      Assessment:  Postpartum  IUD insertion    Plan:  Post IUD insertion instructions reviewed  Xenia conti encouraged  Mira Garcia MD  [facility]      "

## 2019-01-01 ASSESSMENT — ANXIETY QUESTIONNAIRES: GAD7 TOTAL SCORE: 3

## 2019-03-30 ENCOUNTER — HOSPITAL ENCOUNTER (EMERGENCY)
Facility: CLINIC | Age: 29
Discharge: HOME | End: 2019-03-30
Attending: NURSE PRACTITIONER | Admitting: NURSE PRACTITIONER
Payer: COMMERCIAL

## 2019-03-30 VITALS — SYSTOLIC BLOOD PRESSURE: 149 MMHG | DIASTOLIC BLOOD PRESSURE: 91 MMHG | TEMPERATURE: 98.5 F | OXYGEN SATURATION: 98 %

## 2019-03-30 DIAGNOSIS — B02.9 SHINGLES: ICD-10-CM

## 2019-03-30 PROCEDURE — G0463 HOSPITAL OUTPT CLINIC VISIT: HCPCS

## 2019-03-30 PROCEDURE — 99213 OFFICE O/P EST LOW 20 MIN: CPT | Mod: Z6 | Performed by: NURSE PRACTITIONER

## 2019-03-30 RX ORDER — VALACYCLOVIR HYDROCHLORIDE 1 G/1
1000 TABLET, FILM COATED ORAL 3 TIMES DAILY
Qty: 21 TABLET | Refills: 0 | Status: SHIPPED | OUTPATIENT
Start: 2019-03-30 | End: 2019-04-06

## 2019-03-30 ASSESSMENT — ENCOUNTER SYMPTOMS
COUGH: 0
FATIGUE: 0
CHILLS: 0
FEVER: 0
HEADACHES: 0
NUMBNESS: 0
DIZZINESS: 0

## 2019-03-30 NOTE — ED AVS SNAPSHOT
[facility] Emergency Department  [address] [address]  Phone:  [phone]  Fax:  [phone]                                    Jennifer Orlando   MRN: [id number]    Department:  [facility] Department   Date of Visit:  3/30/2019           After Visit Summary Signature Page    I have received my discharge instructions, and my questions have been answered. I have discussed any challenges I see with this plan with the nurse or doctor.    ..........................................................................................................................................  Patient/Patient Representative Signature      ..........................................................................................................................................  Patient Representative Print Name and Relationship to Patient    ..................................................               ................................................  Date                                   Time    ..........................................................................................................................................  Reviewed by Signature/Title    ...................................................              ..............................................  Date                                               Time          22EPIC Rev 08/18

## 2019-03-31 NOTE — ED PROVIDER NOTES
History   No chief complaint on file.    HPI  Jennifer Orlando is a 29 year old female who presents to urgent care for evaluation of rash.  Rash started 5 days ago.  Rash is located to the left low back, along her waistband.  There is additional lesions along her lateral left thigh.  Rash feels irritating, but is not significantly pruritic.  No fevers.    Allergies:  Allergies   Allergen Reactions     Pineapple Hives     Ceclor [Cefaclor] Rash     As a baby, doesn't remember. 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     Priority: Medium     Obesity 04/22/2013     Priority: Medium     CARDIOVASCULAR SCREENING; LDL GOAL LESS THAN 160 10/10/2012     Priority: Medium     Menstrual migraine 08/13/2012     Priority: Medium        Past Medical History:    Past Medical History:   Diagnosis Date     Chickenpox      Hypertension affecting pregnancy      Obese      Otitis media        Past Surgical History:    Past Surgical History:   Procedure Laterality Date     DILATION AND CURETTAGE SUCTION N/A 11/29/2017    Procedure: DILATION AND CURETTAGE SUCTION;  Dilation and Curettage Suction;  Surgeon: Triny Soni MD;  Location: [facility] OR     PE TUBES      x 2       Family History:    Family History   Problem Relation Age of Onset     Hypertension Mother      Diabetes Mother      Diabetes Maternal Grandmother      Hypertension Maternal Grandmother      Hypertension Maternal Grandfather      Autoimmune Disease Maternal Grandfather         lupus     Hypertension Paternal Grandmother      Heart Disease Paternal Grandmother         stroke     Cancer - colorectal Paternal Grandfather         in his 70's     Gastrointestinal Disease Brother         stomach ulcer       Social History:  Marital Status:   [2]  Social  History     Tobacco Use     Smoking status: Never Smoker     Smokeless tobacco: Never Used   Substance Use Topics     Alcohol use: No     Alcohol/week: 0.0 oz     Comment: occas.- quit with pregnancy     Drug use: No        Medications:      valACYclovir (VALTREX) 1000 mg tablet   Prenatal Vit-Fe Fumarate-FA (PRENATAL MULTIVITAMIN PLUS IRON) 27-0.8 MG TABS per tablet   ranitidine (ZANTAC) 150 MG tablet         Review of Systems   Constitutional: Negative for chills, fatigue and fever.   HENT: Negative for congestion.    Respiratory: Negative for cough.    Skin: Positive for rash.   Neurological: Negative for dizziness, numbness and headaches.       Physical Exam   BP: (!) 149/91  Heart Rate: 93  Temp: 98.5  F (36.9  C)  SpO2: 98 %      Physical Exam    GENERAL APPEARANCE: healthy, alert and no distress  RESP: lungs clear to auscultation - no rales, rhonchi or wheezes  CV: regular rates and rhythm, normal S1 S2, no murmur noted  SKIN: crop of vesicular lesions on left low back, and lesions along the lateral left thigh.  Rash is in a dermatomal pattern.    ED Course        Procedures               No results found for this or any previous visit (from the past 24 hour(s)).    Medications - No data to display    Assessments & Plan (with Medical Decision Making)   History and exam is consistent with a shingles infection.  I discussed the course of illness with patient.  Patient was informed that antivirals are best 1 started within 72 hours of the rash, but may still be of some benefit.  Patient given Rx for Valtrex.  Instructed on the importance of avoiding contact with people who have not had chickenpox including her infant child at home.   Keep the area covered.  Wash hands.  Went down surfaces.  I have reviewed the nursing notes.    I have reviewed the findings, diagnosis, plan and need for follow up with the patient.         Medication List      Started    valACYclovir 1000 mg tablet  Commonly known as:   VALTREX  1,000 mg, Oral, 3 TIMES DAILY            Final diagnoses:   Shingles       3/30/2019   [facility] EMERGENCY DEPARTMENT     Judit Novoa APRN CNP  03/30/19 1958

## 2019-12-27 ENCOUNTER — TELEPHONE (OUTPATIENT)
Dept: OBGYN | Facility: CLINIC | Age: 29
End: 2019-12-27

## 2019-12-27 NOTE — LETTER
December 27, 2019      Jennifer Orlando  [address]  [place] [address]    Dear ,      This letter is to remind you that you are due for your  Annual Exam.    Please call [phone] to schedule your appointment at your earliest convenience.     If you have completed the tests outside of [facility], please have the results forwarded to our office. We will update the chart for your primary Physician to review before your next annual physical.     Sincerely,      Your [facility] Care Team

## 2019-12-27 NOTE — TELEPHONE ENCOUNTER
Panel Management Review    Date of last visit with a [facility] provider:  on 12/31/18.  Date of next visit with a [facility] provider: None.    Health Maintenance List    Health Maintenance   Topic Date Due     PHQ-2  01/01/2019     PAP  08/04/2019     INFLUENZA VACCINE (1) 09/01/2019     PREVENTIVE CARE VISIT  12/31/2019     ADVANCE CARE PLANNING  11/20/2023     DTAP/TDAP/TD IMMUNIZATION (9 - Td) 09/07/2028     HIV SCREENING  Completed     MIGRAINE ACTION PLAN  Completed     IPV IMMUNIZATION  Completed     MENINGITIS IMMUNIZATION  Completed       Composite cancer screening  Chart review shows that this patient is due/due soon for the following Pap Smear  Lab Results   Component Value Date    PAP NIL 08/04/2016     Past Surgical History:   Procedure Laterality Date     DILATION AND CURETTAGE SUCTION N/A 11/29/2017    Procedure: DILATION AND CURETTAGE SUCTION;  Dilation and Curettage Suction;  Surgeon: Triny Soni MD;  Location: [facility] OR     PE TUBES      x 2       Is hysterectomy listed in surgical history? No   Is mastectomy listed in surgical history? No     Summary:    Patient is due/failing the following:   Pap Smear    Action needed: Patient needs office visit for annual.    Type of outreach:  Sent letter.      Staff Signature:  Kelsi Limon CMA       Patient returned call and made aware of the following.

## 2019-12-29 NOTE — PROGRESS NOTES
"CC: prenatal  S:  She had a bad hemorrhoid but it is better now. She noticed 3 or 4 days ago.  No lof/vb/dc.  Good fm.  No headaches/visual changes   /69 (BP Location: Left arm, Patient Position: Chair, Cuff Size: Adult Regular)  Pulse 99  Temp 98.1  F (36.7  C) (Tympanic)  Resp 18  Ht 5' 5.75\" (1.67 m)  Wt 235 lb 9.6 oz (106.9 kg)  LMP 02/19/2018  BMI 38.32 kg/m2  Estimated fetal weight: 1,229 grams, corresponding to the 54th  percentile based on the reported previously established due date.          IMPRESSION:    1. Single live intrauterine pregnancy of 28 weeks and 6 days gestation  by current ultrasound measurement. Fetal growth is 4 days more  advanced than what is expected from the reported previously  established due date.  2. Estimated fetal weight is at the 54th percentile.      ARABELLA ONOFRE MD  A/p chronic hypertension-normal labs, good growth, induction discussed possibly 37-39 weeks  Routine precautions  F/u 2 weeks  Chanel Watson MD      "
normal sinus rhythm

## 2020-02-10 ENCOUNTER — HEALTH MAINTENANCE LETTER (OUTPATIENT)
Age: 30
End: 2020-02-10

## 2024-04-01 PROBLEM — O41.8X10 OTHER SPECIFIED DISORDERS OF AMNIOTIC FLUID AND MEMBRANES, FIRST TRIMESTER, NOT APPLICABLE OR UNSPECIFIED: Status: RESOLVED | Noted: 2018-05-20 | Resolved: 2018-12-31

## (undated) DEVICE — SUCTION VACUUM CANISTER STANDARD W/LID&CAPS 003987-901

## (undated) DEVICE — DRSG TELFA 3X8" 1238

## (undated) DEVICE — PAD PERI INDIV WRAP 11" 2022

## (undated) DEVICE — SOL NACL 0.9% IRRIG 1000ML BOTTLE 07138-09

## (undated) DEVICE — SOL WATER IRRIG 1000ML BOTTLE 2F7114

## (undated) DEVICE — DECANTER VIAL 2006S

## (undated) DEVICE — SOL NACL 0.9% IRRIG 1000ML BOTTLE 2F7124

## (undated) DEVICE — SUCTION CANNULA UTERINE 08MM CVD

## (undated) DEVICE — GLOVE PROTEXIS BLUE W/NEU-THERA 6.0  2D73EB60

## (undated) DEVICE — LUBRICATING JELLY 4.25OZ

## (undated) DEVICE — PACK LAPAROSCOPY/PELVISCOPY STD

## (undated) DEVICE — SUCTION HANDLE SWIVEL

## (undated) DEVICE — CATH INTERMITTENT CLEAN-CATH FEMALE 14FR 6" VINYL LF 420614

## (undated) DEVICE — TUBING VACUUM COLLECTION 6FT

## (undated) DEVICE — GOWN LG DISP 9515

## (undated) DEVICE — STOCKING SLEEVE COMPRESSION CALF LG

## (undated) DEVICE — GLOVE PROTEXIS MICRO 5.5  2D73PM55

## (undated) DEVICE — NDL SPINAL 22GA 3.5" QUINCKE 405181

## (undated) RX ORDER — DEXAMETHASONE SODIUM PHOSPHATE 4 MG/ML
INJECTION, SOLUTION INTRA-ARTICULAR; INTRALESIONAL; INTRAMUSCULAR; INTRAVENOUS; SOFT TISSUE
Status: DISPENSED
Start: 2017-11-29

## (undated) RX ORDER — ONDANSETRON 2 MG/ML
INJECTION INTRAMUSCULAR; INTRAVENOUS
Status: DISPENSED
Start: 2017-11-29

## (undated) RX ORDER — LIDOCAINE HYDROCHLORIDE 10 MG/ML
INJECTION, SOLUTION INFILTRATION; PERINEURAL
Status: DISPENSED
Start: 2017-11-29

## (undated) RX ORDER — PROPOFOL 10 MG/ML
INJECTION, EMULSION INTRAVENOUS
Status: DISPENSED
Start: 2017-11-29

## (undated) RX ORDER — FENTANYL CITRATE 50 UG/ML
INJECTION, SOLUTION INTRAMUSCULAR; INTRAVENOUS
Status: DISPENSED
Start: 2017-11-29